# Patient Record
Sex: MALE | Race: WHITE | NOT HISPANIC OR LATINO | URBAN - METROPOLITAN AREA
[De-identification: names, ages, dates, MRNs, and addresses within clinical notes are randomized per-mention and may not be internally consistent; named-entity substitution may affect disease eponyms.]

---

## 2017-05-24 ENCOUNTER — APPOINTMENT (RX ONLY)
Dept: URBAN - METROPOLITAN AREA CLINIC 349 | Facility: CLINIC | Age: 67
Setting detail: DERMATOLOGY
End: 2017-05-24

## 2017-05-24 DIAGNOSIS — D22 MELANOCYTIC NEVI: ICD-10-CM

## 2017-05-24 PROBLEM — D22.0 MELANOCYTIC NEVI OF LIP: Status: ACTIVE | Noted: 2017-05-24

## 2017-05-24 PROBLEM — D22.5 MELANOCYTIC NEVI OF TRUNK: Status: ACTIVE | Noted: 2017-05-24

## 2017-05-24 PROCEDURE — ? SHAVE REMOVAL

## 2017-05-24 PROCEDURE — ? BIOPSY BY SHAVE METHOD

## 2017-05-24 PROCEDURE — ? OTHER

## 2017-05-24 PROCEDURE — 88305 TISSUE EXAM BY PATHOLOGIST: CPT

## 2017-05-24 PROCEDURE — 40490 BIOPSY OF LIP: CPT | Mod: 59

## 2017-05-24 PROCEDURE — 11301 SHAVE SKIN LESION 0.6-1.0 CM: CPT

## 2017-05-24 PROCEDURE — ? COUNSELING

## 2017-05-24 PROCEDURE — A4550 SURGICAL TRAYS: HCPCS

## 2017-05-24 PROCEDURE — ? PATHOLOGY BILLING

## 2017-05-24 PROCEDURE — 11302 SHAVE SKIN LESION 1.1-2.0 CM: CPT

## 2017-05-24 ASSESSMENT — LOCATION DETAILED DESCRIPTION DERM
LOCATION DETAILED: RIGHT INFERIOR MUCOSAL LIP
LOCATION DETAILED: RIGHT INFERIOR MEDIAL MIDBACK
LOCATION DETAILED: LEFT LATERAL ABDOMEN
LOCATION DETAILED: RIGHT INFERIOR MUCOSAL LIP
LOCATION DETAILED: RIGHT INFERIOR MEDIAL MIDBACK
LOCATION DETAILED: LEFT LATERAL ABDOMEN

## 2017-05-24 ASSESSMENT — LOCATION SIMPLE DESCRIPTION DERM
LOCATION SIMPLE: RIGHT INFERIOR MUCOSAL LIP
LOCATION SIMPLE: RIGHT LOWER BACK
LOCATION SIMPLE: ABDOMEN
LOCATION SIMPLE: RIGHT LOWER BACK
LOCATION SIMPLE: RIGHT INFERIOR MUCOSAL LIP
LOCATION SIMPLE: ABDOMEN

## 2017-05-24 ASSESSMENT — LOCATION ZONE DERM
LOCATION ZONE: TRUNK
LOCATION ZONE: LIP
LOCATION ZONE: TRUNK
LOCATION ZONE: LIP

## 2017-05-24 NOTE — PROCEDURE: BIOPSY BY SHAVE METHOD
X Size Of Lesion In Cm: 0
Billing Type: Third-Party Bill
Silver Nitrate Text: The wound bed was treated with silver nitrate after the biopsy was performed.
Hemostasis: Electrodesiccation
Wound Care: Vaseline
Biopsy Method: Dermablade
Dressing: bandage
Bill 25030 For Specimen Handling/Conveyance To Laboratory?: no
Cryotherapy Text: The wound bed was treated with cryotherapy after the biopsy was performed.
Type Of Destruction Used: Curettage
Biopsy Type: H and E
Anesthesia Volume In Cc (Will Not Render If 0): 1
Anesthesia Type: 1% lidocaine with epinephrine
Bill For Surgical Tray: yes
Post-Care Instructions: I reviewed with the patient in detail post-care instructions. Patient is to keep the biopsy site dry overnight, and then apply bacitracin twice daily until healed. Patient may apply hydrogen peroxide soaks to remove any crusting.  After the procedure, the patient was observed for 5-10 minutes and was oriented to person, place and time and demied feeling dizzy, queasy, and stated that they did not feel that they were going to faint.
Detail Level: Detailed
Notification Instructions: Patient will be notified of biopsy results. However, patient instructed to call the office if not contacted within 2 weeks.
Consent: Written consent was obtained and risks were reviewed including but not limited to scarring, infection, bleeding, scabbing, incomplete removal, nerve damage and allergy to anesthesia.
Electrodesiccation And Curettage Text: The wound bed was treated with electrodesiccation and curettage after the biopsy was performed.
Electrodesiccation Text: The wound bed was treated with electrodesiccation after the biopsy was performed.
Accession #: [email or web address]

## 2017-05-24 NOTE — PROCEDURE: SHAVE REMOVAL
Post-Care Instructions: I reviewed with the patient in detail post-care instructions. Patient is to keep the biopsy site dry overnight, and then apply vaseline twice daily until healed. Patient may apply hydrogen peroxide soaks to remove any crusting. After the procedure, the patient was observed for 5-10 minutes and was oriented to person, place and time and demied feeling dizzy, queasy, and stated that they did not feel that they were going to faint.
Accession #: [email or web address]
Anesthesia Volume In Cc: 0.5
Render Post-Care Instructions In Note?: yes
Size Of Lesion In Cm (Required): 0.6
Size Of Margin In Cm (Margins Are Not Added To Billing Dimensions): -
Billing Type: Third-Party Bill
Path Notes (To The Dermatopathologist): Please check margins.
Detail Level: Detailed
Medical Necessity Clause: This procedure was medically necessary because the lesion that was treated was: growing
Anesthesia Type: 1% lidocaine with epinephrine
Medical Necessity Information: It is in your best interest to select a reason for this procedure from the list below. All of these items fulfill various CMS LCD requirements except the new and changing color options.
Bill 13273 For Specimen Handling/Conveyance To Laboratory?: no
X Size Of Lesion In Cm (Optional): 0
Notification Instructions: Patient will be notified of biopsy results. However, patient instructed to call the office if not contacted within 2 weeks.
Hemostasis: Drysol
Biopsy Method: Dermablade
Consent was obtained from the patient. The risks and benefits to therapy were discussed in detail. Specifically, the risks of infection, scarring, bleeding, prolonged wound healing, incomplete removal, allergy to anesthesia, nerve injury and recurrence were addressed. Prior to the procedure, the treatment site was clearly identified and confirmed by the patient. All components of Universal Protocol/PAUSE Rule completed.
Size Of Lesion In Cm (Required): 1.1
Wound Care: Vaseline

## 2018-11-09 ENCOUNTER — HOSPITAL ENCOUNTER (OUTPATIENT)
Dept: PHYSICAL THERAPY | Age: 68
Discharge: HOME | End: 2018-11-09
Attending: FAMILY MEDICINE
Payer: MEDICARE

## 2018-11-09 DIAGNOSIS — M54.2 NECK PAIN: ICD-10-CM

## 2018-11-09 PROCEDURE — 97140 MANUAL THERAPY 1/> REGIONS: CPT

## 2018-11-09 PROCEDURE — G8982 BODY POS GOAL STATUS: HCPCS

## 2018-11-09 PROCEDURE — G8981 BODY POS CURRENT STATUS: HCPCS

## 2018-11-09 PROCEDURE — 97161 PT EVAL LOW COMPLEX 20 MIN: CPT

## 2018-11-09 NOTE — THERAPY EVALUATION
Michaela Rice  : 1950  [place]  [place] [address] [place] [address]  [phone]   [phone]        OUTPATIENT PHYSICAL THERAPY:Initial Assessment and Daily Note 2018   ICD-10: Treatment Diagnosis: Cervicalgia [M54.2]; Headache [R51]  Precautions/Allergies:   Patient has no known allergies. MD Orders: Evaluate and Treat MEDICAL/REFERRING DIAGNOSIS:  Neck pain [M54.2]   DATE OF ONSET: 2018  REFERRING PHYSICIAN: Charles Merritt MD  RETURN PHYSICIAN APPOINTMENT: TBD     INITIAL ASSESSMENT:  Mr. Riya Art presents with signs of R sided neck pain. Mobility restrictions are present in the upper cervical spine bilaterally with pain present during right sided cervical rotation. Pt. Demonstrates moderate myofascial pain additionally in the R splenius capitis, R sternocleidomastoid, and R scalene musculature. Pt. Will benefit from skilled PT including manual therapy, range of motion, and mobility exercises to address impairments identified. PROBLEM LIST (Impacting functional limitations):  1. Decreased Strength  2. Increased Pain  3. Decreased Activity Tolerance  4. Decreased Flexibility/Joint Mobility  5. Decreased [place] with Home Exercise Program INTERVENTIONS PLANNED:  1. Home Exercise Program (HEP)  2. Manual Therapy  3. Range of Motion (ROM)  4. Therapeutic Exercise/Strengthening   TREATMENT PLAN:  Effective Dates: 2018 TO 2019 (90 days). Frequency/Duration: 2 times a week for 90 Day(s)  GOALS: (Goals have been discussed and agreed upon with patient.)  Discharge Goals: Time Frame: 8 weeks  1. Pt. Will demonstrate 60 degrees of cervical rotation bilaterally to improve driving safety. 2. Pt. Will demonstrate <3/10 R sided neck pain with 8 hours of sleep to improve pain. 3. Pt. Will score < 12% on the NDI to demonstrate improved pain and function. Rehabilitation Potential For Stated Goals: Good              The information in this section was collected on 11/9/18 (except where otherwise noted). HISTORY:   History of Present Injury/Illness (Reason for Referral):  Pt. Attends PT c/o R sided neck pain with gradual insidious onset about a year ago. The pain is located along the R side of the neck and aggravated with turning the head, sleeping at night, and performing daily activities. Pt. Denies injury or numbness tingling, difficulty speaking, tinnitus, or difficulty swallowing. Pt. Notes only treatment so far was massage that helped for a few hours. Pt. Would like to address pain and return to previous level of function. Past Medical History/Comorbidities:   Mr. Gisell Warren  has a past medical history of Bradycardia, HLD (hyperlipidemia), Osteoarthritis, and Personal history of colonic polyps. Mr. Gisell Warren  has a past surgical history that includes hx gi (2007); hx colonoscopy (9/10/14); pr abdomen surgery proc unlisted; FISTULOTOMY/MAF  (N/A, 9/10/2014); and COLONOSCOPY WITH POLYPECTOMY (N/A, 9/10/2014). Social History/Living Environment:     Lives with family in 2 story home  Prior Level of Function/Work/Activity:  Functioned independently without limitations. Ambulatory/Rehab Services H2 Model Falls Risk Assessment    Risk Factors:       (1)  Gender [Male] Ability to Rise from Chair:       (0)  Ability to rise in a single movement    Falls Prevention Plan:       No modifications necessary   Total: (5 or greater = High Risk): 1    ©2010 [facility] of [employer]. All Rights Reserved. [facility] States Patent #[id number]. Federal Law prohibits the replication, distribution or use without written permission from [facility] [employer]     Current Medications:       Current Outpatient Medications:     rosuvastatin (CRESTOR) 10 mg tablet, TAKE 1 TABLET BY MOUTH DAILY, Disp: 90 Tab, Rfl: 1    multivitamin (ONE A DAY) tablet, Take 1 tablet by mouth daily. , Disp: , Rfl:     ibuprofen (MOTRIN) 200 mg tablet, Take 200 mg by mouth every six (6) hours as needed for Pain. Hold till after surgery   Indications: PAIN, Disp: , Rfl:     cyanocobalamin (VITAMIN B12) 500 mcg tablet, Take 500 mcg by mouth daily. , Disp: , Rfl:    Date Last Reviewed:  11/9/2018   Number of Personal Factors/Comorbidities that affect the Plan of Care: 0: LOW COMPLEXITY   EXAMINATION:   Observation:       Palpation:  Upper Trapezius:-- Levator Scapulae:-- Posterior Cervical: tender on R side   Sternocleidomastoid: Tender on R side Scalenes: Tender on R side Other:       Flexibility: Limited suboccipitals, SCM, scalenes bilaterally      Range of Motion: Cervical in degrees  Flexion: 50                                        Extesnion: 30                       Right                     Left   Rotation 55 pain 50 pain   Side Bend -- --       Strength: Manual Muscle Test: upper quarter screen 5/5 MMT bilaterally. Cervical Flexion Endurance Test: --    Special Testing:  Spurlings Right: Left:   Upper limb Tension Test (ULTT1) -- --   Cervical Distraction -- --   Cervical flexion rotation test (+) (+)   Other:       Neuro: reflexes 2+ bilateral UE   Body Structures Involved:  1. Joints  2. Muscles Body Functions Affected:  1. Neuromusculoskeletal  2. Movement Related Activities and Participation Affected:  1. Mobility  2. Self Care   Number of elements (examined above) that affect the Plan of Care: 1-2: LOW COMPLEXITY   CLINICAL PRESENTATION:   Presentation: Stable and uncomplicated: LOW COMPLEXITY   CLINICAL DECISION MAKING:   Outcome Measure: Tool Used: Neck Disability Index (NDI)  Score:  Initial: 11/50  Most Recent: X/50 (Date: -- )   Interpretation of Score: The Neck Disability Index is a revised form of the Oswestry Low Back Pain Index and is designed to measure the activities of daily living in person's with neck pain.   Each section is scored on a 0-5 scale, 5 representing the greatest disability. The scores of each section are added together for a total score of 50. Score 0 1-10 11-20 21-30 31-40 41-49 50   Modifier CH CI CJ CK CL CM CN     ? Changing and Maintaining Body Position:     - CURRENT STATUS: CJ - 20%-39% impaired, limited or restricted    - GOAL STATUS: CI - 1%-19% impaired, limited or restricted    - D/C STATUS:  ---------------To be determined---------------    Medical Necessity:   · Patient is expected to demonstrate progress in strength and range of motion to increase independence with sleeping, driving, and ADL's. Reason for Services/Other Comments:  · Patient will benefit from skilled PT to address impairments identified through evaluation and return to previous ADL and recreational capacity. Use of outcome tool(s) and clinical judgement create a POC that gives a: Clear prediction of patient's progress: LOW COMPLEXITY            TREATMENT:   (In addition to Assessment/Re-Assessment sessions the following treatments were rendered)  Pre-treatment Symptoms/Complaints:  R sided neck pain   Pain: Initial:   Pain Intensity 1: 8 /10 Post Session:  6/10     Therapeutic Exercise: (5 Minutes):  Exercises per grid below to improve mobility and strength. Required moderate verbal and manual cues to promote proper body alignment and promote proper body posture. Progressed range as indicated. Date:  11/9/2018   Activity/Exercise Parameters   Cervical rotation 5 minutes                           Manual Therapy (    Soft Tissue Mobilization Duration  Duration: 10 Minutes): Manual techniques to facilitate improved motion and decreased pain.  (Used abbreviations: MET - muscle energy technique; PNF - proprioceptive neuromuscular facilitation; NMR - neuromuscular re-education; a/p - anterior to posterior; p/a - posterior to anterior)   · Soft tissue mobilization: R splenius capitis, sternocleidomastoid, scalenes  · Joint mobilization: C1/2 in prone unilateral p/a on the R side. Grade III-IV    MedBridge Portal  Treatment/Session Assessment:    · Response to Treatment:  Pt. Tolerates ROM exercises without complaints and improved cervical rotation to the Right within session. .  · Compliance with Program/Exercises: Will assess as treatment progresses. · Recommendations/Intent for next treatment session: \"Next visit will focus on manual therapy and ROM exercises. \".   Total Treatment Duration:  PT Patient Time In/Time Out  Time In: 1030  Time Out: [address] [facility] AYLIN Ferris

## 2018-11-20 ENCOUNTER — HOSPITAL ENCOUNTER (OUTPATIENT)
Dept: PHYSICAL THERAPY | Age: 68
Discharge: HOME | End: 2018-11-20
Payer: MEDICARE

## 2018-11-20 PROCEDURE — 97140 MANUAL THERAPY 1/> REGIONS: CPT

## 2018-11-20 PROCEDURE — 97110 THERAPEUTIC EXERCISES: CPT

## 2018-11-20 NOTE — PROGRESS NOTES
Anahi Marsh  : 1950  [place]  [place]: [place] [address] at [address]  [address] [place] [address]  [phone]   [phone]        OUTPATIENT PHYSICAL THERAPY:Daily Note 2018   ICD-10: Treatment Diagnosis: Cervicalgia [M54.2]; Headache [R51]  Precautions/Allergies:   Patient has no known allergies. MD Orders: Evaluate and Treat MEDICAL/REFERRING DIAGNOSIS:  Neck pain [M54.2]   DATE OF ONSET: 2018  REFERRING PHYSICIAN: Kyle Black MD  RETURN PHYSICIAN APPOINTMENT: TBD     INITIAL ASSESSMENT:  Mr. Dandre Burciaga presents with signs of R sided neck pain. Mobility restrictions are present in the upper cervical spine bilaterally with pain present during right sided cervical rotation. Pt. Demonstrates moderate myofascial pain additionally in the R splenius capitis, R sternocleidomastoid, and R scalene musculature. Pt. Will benefit from skilled PT including manual therapy, range of motion, and mobility exercises to address impairments identified. PROBLEM LIST (Impacting functional limitations):  1. Decreased Strength  2. Increased Pain  3. Decreased Activity Tolerance  4. Decreased Flexibility/Joint Mobility  5. Decreased [place] with Home Exercise Program INTERVENTIONS PLANNED:  1. Home Exercise Program (HEP)  2. Manual Therapy  3. Range of Motion (ROM)  4. Therapeutic Exercise/Strengthening   TREATMENT PLAN:  Effective Dates: 2018 TO 2019 (90 days). Frequency/Duration: 2 times a week for 90 Day(s)  GOALS: (Goals have been discussed and agreed upon with patient.)  Discharge Goals: Time Frame: 8 weeks  1. Pt. Will demonstrate 60 degrees of cervical rotation bilaterally to improve driving safety. 2. Pt. Will demonstrate <3/10 R sided neck pain with 8 hours of sleep to improve pain. 3. Pt. Will score < 12% on the NDI to demonstrate improved pain and function.    Rehabilitation Potential For Stated Goals: Good              The information in this section was collected on 11/9/18 (except where otherwise noted). HISTORY:   History of Present Injury/Illness (Reason for Referral):  Pt. Attends PT c/o R sided neck pain with gradual insidious onset about a year ago. The pain is located along the R side of the neck and aggravated with turning the head, sleeping at night, and performing daily activities. Pt. Denies injury or numbness tingling, difficulty speaking, tinnitus, or difficulty swallowing. Pt. Notes only treatment so far was massage that helped for a few hours. Pt. Would like to address pain and return to previous level of function. Past Medical History/Comorbidities:   Mr. Inés De  has a past medical history of Bradycardia, HLD (hyperlipidemia), Osteoarthritis, and Personal history of colonic polyps. Mr. Inés De  has a past surgical history that includes hx gi (2007); hx colonoscopy (9/10/14); pr abdomen surgery proc unlisted; FISTULOTOMY/MAF  (N/A, 9/10/2014); and COLONOSCOPY WITH POLYPECTOMY (N/A, 9/10/2014). Social History/Living Environment:     Lives with family in 2 story home  Prior Level of Function/Work/Activity:  Functioned independently without limitations. Ambulatory/Rehab Services H2 Model Falls Risk Assessment    Risk Factors:       (1)  Gender [Male] Ability to Rise from Chair:       (0)  Ability to rise in a single movement    Falls Prevention Plan:       No modifications necessary   Total: (5 or greater = High Risk): 1    ©2010 [facility] of [employer]. All Rights Reserved. [facility] States Patent #[id number]. Federal Law prohibits the replication, distribution or use without written permission from [facility] [employer]     Current Medications:       Current Outpatient Medications:     rosuvastatin (CRESTOR) 10 mg tablet, TAKE 1 TABLET BY MOUTH DAILY, Disp: 90 Tab, Rfl: 1    multivitamin (ONE A DAY) tablet, Take 1 tablet by mouth daily. , Disp: , Rfl:     ibuprofen (MOTRIN) 200 mg tablet, Take 200 mg by mouth every six (6) hours as needed for Pain. Hold till after surgery   Indications: PAIN, Disp: , Rfl:     cyanocobalamin (VITAMIN B12) 500 mcg tablet, Take 500 mcg by mouth daily. , Disp: , Rfl:    Date Last Reviewed:  11/20/2018   Number of Personal Factors/Comorbidities that affect the Plan of Care: 0: LOW COMPLEXITY   EXAMINATION:   Observation:       Palpation:  Upper Trapezius:-- Levator Scapulae:-- Posterior Cervical: tender on R side   Sternocleidomastoid: Tender on R side Scalenes: Tender on R side Other:       Flexibility: Limited suboccipitals, SCM, scalenes bilaterally      Range of Motion: Cervical in degrees  Flexion: 50                                        Extesnion: 30                       Right                     Left   Rotation 55 pain 50 pain   Side Bend -- --       Strength: Manual Muscle Test: upper quarter screen 5/5 MMT bilaterally. Cervical Flexion Endurance Test: --    Special Testing:  Spurlings Right: Left:   Upper limb Tension Test (ULTT1) -- --   Cervical Distraction -- --   Cervical flexion rotation test (+) (+)   Other:       Neuro: reflexes 2+ bilateral UE   Body Structures Involved:  1. Joints  2. Muscles Body Functions Affected:  1. Neuromusculoskeletal  2. Movement Related Activities and Participation Affected:  1. Mobility  2. Self Care   Number of elements (examined above) that affect the Plan of Care: 1-2: LOW COMPLEXITY   CLINICAL PRESENTATION:   Presentation: Stable and uncomplicated: LOW COMPLEXITY   CLINICAL DECISION MAKING:   Outcome Measure: Tool Used: Neck Disability Index (NDI)  Score:  Initial: 11/50  Most Recent: X/50 (Date: -- )   Interpretation of Score: The Neck Disability Index is a revised form of the Oswestry Low Back Pain Index and is designed to measure the activities of daily living in person's with neck pain. Each section is scored on a 0-5 scale, 5 representing the greatest disability. The scores of each section are added together for a total score of 50. Score 0 1-10 11-20 21-30 31-40 41-49 50   Modifier CH CI CJ CK CL CM CN     ? Changing and Maintaining Body Position:     - CURRENT STATUS: CJ - 20%-39% impaired, limited or restricted    - GOAL STATUS: CI - 1%-19% impaired, limited or restricted    - D/C STATUS:  ---------------To be determined---------------    Medical Necessity:   · Patient is expected to demonstrate progress in strength and range of motion to increase independence with sleeping, driving, and ADL's. Reason for Services/Other Comments:  · Patient will benefit from skilled PT to address impairments identified through evaluation and return to previous ADL and recreational capacity. Use of outcome tool(s) and clinical judgement create a POC that gives a: Clear prediction of patient's progress: LOW COMPLEXITY            TREATMENT:   (In addition to Assessment/Re-Assessment sessions the following treatments were rendered)  Pre-treatment Symptoms/Complaints:  Pt. Notes good compliance with HEP and some improvement in R sided rotation. Pain: Initial:   Pain Intensity 1: 8 /10 Post Session:  6/10     Therapeutic Exercise: (25 Minutes):  Exercises per grid below to improve mobility and strength. Required moderate verbal and manual cues to promote proper body alignment and promote proper body posture. Progressed range as indicated. Date:  11/20/2018   Activity/Exercise Parameters   Cervical rotation 8 minutes   Chin tucks 5 minutes   Cervical Extension SNAGS 3 x 15   Rows (green band) 3 x 20   Foam roll (thoracic extension) 3 minutes           Manual Therapy (    Soft Tissue Mobilization Duration  Duration: 30 Minutes): Manual techniques to facilitate improved motion and decreased pain.  (Used abbreviations: MET - muscle energy technique; PNF - proprioceptive neuromuscular facilitation; NMR - neuromuscular re-education; a/p - anterior to posterior; p/a - posterior to anterior)   · Soft tissue mobilization: R splenius capitis, sternocleidomastoid, scalenes  · Joint mobilization: C1/2 in prone unilateral p/a on the R side. Grade III-IV; C0/C1 flexion grade III  · Joint mobilization: Grade V C1/2 rotation in supine to the L side. · Manual cervical traction    MedBridge Portal  Treatment/Session Assessment:    · Response to Treatment:  Pt. Continues to demonstrate significant R sided upper cervical joint restriction. SNAGS for cervical extension improve pain within session. · Compliance with Program/Exercises: Compliant most of the time. · Recommendations/Intent for next treatment session: \"Next visit will focus on manual therapy and ROM exercises. \".   Total Treatment Duration: 55 minutes total time  PT Patient Time In/Time Out  Time In: 1500  Time Out: 90 [facility] AYLIN Ferris

## 2018-11-21 ENCOUNTER — HOSPITAL ENCOUNTER (OUTPATIENT)
Dept: PHYSICAL THERAPY | Age: 68
Discharge: HOME | End: 2018-11-21
Payer: MEDICARE

## 2018-11-21 PROCEDURE — 97140 MANUAL THERAPY 1/> REGIONS: CPT

## 2018-11-21 PROCEDURE — 97110 THERAPEUTIC EXERCISES: CPT

## 2018-11-21 NOTE — PROGRESS NOTES
Daiana Paulino  : 1950  [place]  Secondary: [address]  [phone]   [phone]        OUTPATIENT PHYSICAL THERAPY:Daily Note 2018   ICD-10: Treatment Diagnosis: Cervicalgia [M54.2]; Headache [R51]  Precautions/Allergies:   Patient has no known allergies. MD Orders: Evaluate and Treat MEDICAL/REFERRING DIAGNOSIS:  Neck pain [M54.2]   DATE OF ONSET: 2018  REFERRING PHYSICIAN: Swati Wilkins MD  RETURN PHYSICIAN APPOINTMENT: TBD     INITIAL ASSESSMENT:  Mr. Ellie Cosme presents with signs of R sided neck pain. Mobility restrictions are present in the upper cervical spine bilaterally with pain present during right sided cervical rotation. Pt. Demonstrates moderate myofascial pain additionally in the R splenius capitis, R sternocleidomastoid, and R scalene musculature. Pt. Will benefit from skilled PT including manual therapy, range of motion, and mobility exercises to address impairments identified. PROBLEM LIST (Impacting functional limitations):  1. Decreased Strength  2. Increased Pain  3. Decreased Activity Tolerance  4. Decreased Flexibility/Joint Mobility  5. Decreased Bee with Home Exercise Program INTERVENTIONS PLANNED:  1. Home Exercise Program (HEP)  2. Manual Therapy  3. Range of Motion (ROM)  4. Therapeutic Exercise/Strengthening   TREATMENT PLAN:  Effective Dates: 2018 TO 2019 (90 days). Frequency/Duration: 2 times a week for 90 Day(s)  GOALS: (Goals have been discussed and agreed upon with patient.)  Discharge Goals: Time Frame: 8 weeks  1. Pt. Will demonstrate 60 degrees of cervical rotation bilaterally to improve driving safety. 2. Pt. Will demonstrate <3/10 R sided neck pain with 8 hours of sleep to improve pain. 3. Pt. Will score < 12% on the NDI to demonstrate improved pain and function.    Rehabilitation Potential For Stated Goals: Good              The information in this section was collected on 11/9/18 (except where otherwise noted). HISTORY:   History of Present Injury/Illness (Reason for Referral):  Pt. Attends PT c/o R sided neck pain with gradual insidious onset about a year ago. The pain is located along the R side of the neck and aggravated with turning the head, sleeping at night, and performing daily activities. Pt. Denies injury or numbness tingling, difficulty speaking, tinnitus, or difficulty swallowing. Pt. Notes only treatment so far was massage that helped for a few hours. Pt. Would like to address pain and return to previous level of function. Past Medical History/Comorbidities:   Mr. Gisell Warren  has a past medical history of Bradycardia, HLD (hyperlipidemia), Osteoarthritis, and Personal history of colonic polyps. Mr. Gisell Warren  has a past surgical history that includes hx gi (2007); hx colonoscopy (9/10/14); pr abdomen surgery proc unlisted; FISTULOTOMY/MAF  (N/A, 9/10/2014); and COLONOSCOPY WITH POLYPECTOMY (N/A, 9/10/2014). Social History/Living Environment:     Lives with family in 2 story home  Prior Level of Function/Work/Activity:  Functioned independently without limitations. Ambulatory/Rehab Services H2 Model Falls Risk Assessment    Risk Factors:       (1)  Gender [Male] Ability to Rise from Chair:       (0)  Ability to rise in a single movement    Falls Prevention Plan:       No modifications necessary   Total: (5 or greater = High Risk): 1    ©2010 [facility] [employer]. All Rights Reserved. [facility] States Patent #[id number]. Federal Law prohibits the replication, distribution or use without written permission from [facility]     Current Medications:       Current Outpatient Medications:     rosuvastatin (CRESTOR) 10 mg tablet, TAKE 1 TABLET BY MOUTH DAILY, Disp: 90 Tab, Rfl: 1    multivitamin (ONE A DAY) tablet, Take 1 tablet by mouth daily. , Disp: , Rfl:     ibuprofen (MOTRIN) 200 mg tablet, Take 200 mg by mouth every six (6) hours as needed for Pain. Hold till after surgery   Indications: PAIN, Disp: , Rfl:     cyanocobalamin (VITAMIN B12) 500 mcg tablet, Take 500 mcg by mouth daily. , Disp: , Rfl:    Date Last Reviewed:  11/21/2018   Number of Personal Factors/Comorbidities that affect the Plan of Care: 0: LOW COMPLEXITY   EXAMINATION:   Observation:       Palpation:  Upper Trapezius:-- Levator Scapulae:-- Posterior Cervical: tender on R side   Sternocleidomastoid: Tender on R side Scalenes: Tender on R side Other:       Flexibility: Limited suboccipitals, SCM, scalenes bilaterally      Range of Motion: Cervical in degrees  Flexion: 50                                        Extesnion: 30                       Right                     Left   Rotation 55 pain 50 pain   Side Bend -- --       Strength: Manual Muscle Test: upper quarter screen 5/5 MMT bilaterally. Cervical Flexion Endurance Test: --    Special Testing:  Spurlings Right: Left:   Upper limb Tension Test (ULTT1) -- --   Cervical Distraction -- --   Cervical flexion rotation test (+) (+)   Other:       Neuro: reflexes 2+ bilateral UE   Body Structures Involved:  1. Joints  2. Muscles Body Functions Affected:  1. Neuromusculoskeletal  2. Movement Related Activities and Participation Affected:  1. Mobility  2. Self Care   Number of elements (examined above) that affect the Plan of Care: 1-2: LOW COMPLEXITY   CLINICAL PRESENTATION:   Presentation: Stable and uncomplicated: LOW COMPLEXITY   CLINICAL DECISION MAKING:   Outcome Measure: Tool Used: Neck Disability Index (NDI)  Score:  Initial: 11/50  Most Recent: X/50 (Date: -- )   Interpretation of Score: The Neck Disability Index is a revised form of the Oswestry Low Back Pain Index and is designed to measure the activities of daily living in person's with neck pain. Each section is scored on a 0-5 scale, 5 representing the greatest disability. The scores of each section are added together for a total score of 50. Score 0 1-10 11-20 21-30 31-40 41-49 50   Modifier CH CI CJ CK CL CM CN     ? Changing and Maintaining Body Position:     - CURRENT STATUS: CJ - 20%-39% impaired, limited or restricted    - GOAL STATUS: CI - 1%-19% impaired, limited or restricted    - D/C STATUS:  ---------------To be determined---------------    Medical Necessity:   · Patient is expected to demonstrate progress in strength and range of motion to increase independence with sleeping, driving, and ADL's. Reason for Services/Other Comments:  · Patient will benefit from skilled PT to address impairments identified through evaluation and return to previous ADL and recreational capacity. Use of outcome tool(s) and clinical judgement create a POC that gives a: Clear prediction of patient's progress: LOW COMPLEXITY            TREATMENT:   (In addition to Assessment/Re-Assessment sessions the following treatments were rendered)  Pre-treatment Symptoms/Complaints:  Pt. Notes moderate soreness this AM.  Pt. Reports taking alleve that helped with the pain. Pain: Initial:   Pain Intensity 1: 8 /10 Post Session:  7/10     Therapeutic Exercise: (20 Minutes):  Exercises per grid below to improve mobility and strength. Required moderate verbal and manual cues to promote proper body alignment and promote proper body posture. Progressed range as indicated. Date:  11/21/2018   Activity/Exercise Parameters   Cervical rotation 8 minutes   Chin tucks 3 minutes   Cervical Extension SNAGS --   Rows (green band) --   Foam roll (thoracic extension, chest stretch) 3 minutes   Isometric cervical extensions (green band) 3 x 10   Bird dog 3 x 10   Manual Therapy (    Soft Tissue Mobilization Duration  Duration: 30 Minutes): Manual techniques to facilitate improved motion and decreased pain.  (Used abbreviations: MET - muscle energy technique; PNF - proprioceptive neuromuscular facilitation; NMR - neuromuscular re-education; a/p - anterior to posterior; p/a - posterior to anterior)   · Soft tissue mobilization: R splenius capitis, sternocleidomastoid, scalenes  · Joint mobilization: C1/2 in prone unilateral p/a on the R side. Grade III-IV; C0/C1 flexion grade III  · Joint mobilization: Grade V C1/2 rotation in supine to the L side. · Manual cervical traction    [facility] Portal  Treatment/Session Assessment:    · Response to Treatment:  Pt. Remains tender to palpation of the R SCM, splenius capitis, and scalene musculature. Cervical extension is improved today, but R sided rotation remains limited with pain. · Compliance with Program/Exercises: Compliant most of the time. · Recommendations/Intent for next treatment session: \"Next visit will focus on manual therapy and ROM exercises. \".   Total Treatment Duration: 50 minutes total time  PT Patient Time In/Time Out  Time In: 3850  Time Out: Silvano Lima [address], PT

## 2018-11-26 ENCOUNTER — HOSPITAL ENCOUNTER (OUTPATIENT)
Dept: PHYSICAL THERAPY | Age: 68
Discharge: HOME | End: 2018-11-26
Payer: MEDICARE

## 2018-11-26 PROCEDURE — 97140 MANUAL THERAPY 1/> REGIONS: CPT

## 2018-11-26 PROCEDURE — 97110 THERAPEUTIC EXERCISES: CPT

## 2018-11-26 NOTE — PROGRESS NOTES
Michaela Rice  : 1950  [place]  [place] [address] [facility]  [address], [address]  [phone]   [phone]        OUTPATIENT PHYSICAL THERAPY:Daily Note 2018   ICD-10: Treatment Diagnosis: Cervicalgia [M54.2]; Headache [R51]  Precautions/Allergies:   Patient has no known allergies. MD Orders: Evaluate and Treat MEDICAL/REFERRING DIAGNOSIS:  Neck pain [M54.2]   DATE OF ONSET: 2018  REFERRING PHYSICIAN: Charles Merritt MD  RETURN PHYSICIAN APPOINTMENT: TBD     INITIAL ASSESSMENT:  Mr. Riya Art presents with signs of R sided neck pain. Mobility restrictions are present in the upper cervical spine bilaterally with pain present during right sided cervical rotation. Pt. Demonstrates moderate myofascial pain additionally in the R splenius capitis, R sternocleidomastoid, and R scalene musculature. Pt. Will benefit from skilled PT including manual therapy, range of motion, and mobility exercises to address impairments identified. PROBLEM LIST (Impacting functional limitations):  1. Decreased Strength  2. Increased Pain  3. Decreased Activity Tolerance  4. Decreased Flexibility/Joint Mobility  5. Decreased Christian with Home Exercise Program INTERVENTIONS PLANNED:  1. Home Exercise Program (HEP)  2. Manual Therapy  3. Range of Motion (ROM)  4. Therapeutic Exercise/Strengthening   TREATMENT PLAN:  Effective Dates: 2018 TO 2019 (90 days). Frequency/Duration: 2 times a week for 90 Day(s)  GOALS: (Goals have been discussed and agreed upon with patient.)  Discharge Goals: Time Frame: 8 weeks  1. Pt. Will demonstrate 60 degrees of cervical rotation bilaterally to improve driving safety. 2. Pt. Will demonstrate <3/10 R sided neck pain with 8 hours of sleep to improve pain. 3. Pt. Will score < 12% on the NDI to demonstrate improved pain and function.    Rehabilitation Potential For Stated Goals: Good              The information in this section was collected on 11/9/18 (except where otherwise noted). HISTORY:   History of Present Injury/Illness (Reason for Referral):  Pt. Attends PT c/o R sided neck pain with gradual insidious onset about a year ago. The pain is located along the R side of the neck and aggravated with turning the head, sleeping at night, and performing daily activities. Pt. Denies injury or numbness tingling, difficulty speaking, tinnitus, or difficulty swallowing. Pt. Notes only treatment so far was massage that helped for a few hours. Pt. Would like to address pain and return to previous level of function. Past Medical History/Comorbidities:   Mr. Phillip Bahena  has a past medical history of Bradycardia, HLD (hyperlipidemia), Osteoarthritis, and Personal history of colonic polyps. Mr. Phillip Bahena  has a past surgical history that includes hx gi (2007); hx colonoscopy (9/10/14); pr abdomen surgery proc unlisted; FISTULOTOMY/MAF  (N/A, 9/10/2014); and COLONOSCOPY WITH POLYPECTOMY (N/A, 9/10/2014). Social History/Living Environment:     Lives with family in 2 story home  Prior Level of Function/Work/Activity:  Functioned independently without limitations. Ambulatory/Rehab Services H2 Model Falls Risk Assessment    Risk Factors:       (1)  Gender [Male] Ability to Rise from Chair:       (0)  Ability to rise in a single movement    Falls Prevention Plan:       No modifications necessary   Total: (5 or greater = High Risk): 1    ©2010 [facility] [place]. All Rights Reserved. [facility] States Patent #[id number]. Federal Law prohibits the replication, distribution or use without written permission from [facility] [employer]     Current Medications:       Current Outpatient Medications:     rosuvastatin (CRESTOR) 10 mg tablet, TAKE 1 TABLET BY MOUTH DAILY, Disp: 90 Tab, Rfl: 1    multivitamin (ONE A DAY) tablet, Take 1 tablet by mouth daily. , Disp: , Rfl:     ibuprofen (MOTRIN) 200 mg tablet, Take 200 mg by mouth every six (6) hours as needed for Pain. Hold till after surgery   Indications: PAIN, Disp: , Rfl:     cyanocobalamin (VITAMIN B12) 500 mcg tablet, Take 500 mcg by mouth daily. , Disp: , Rfl:    Date Last Reviewed:  11/26/2018   Number of Personal Factors/Comorbidities that affect the Plan of Care: 0: LOW COMPLEXITY   EXAMINATION:   Observation:       Palpation:  Upper Trapezius:-- Levator Scapulae:-- Posterior Cervical: tender on R side   Sternocleidomastoid: Tender on R side Scalenes: Tender on R side Other:       Flexibility: Limited suboccipitals, SCM, scalenes bilaterally      Range of Motion: Cervical in degrees  Flexion: 50                                        Extesnion: 30                       Right                     Left   Rotation 55 pain 50 pain   Side Bend -- --       Strength: Manual Muscle Test: upper quarter screen 5/5 MMT bilaterally. Cervical Flexion Endurance Test: --    Special Testing:  Spurlings Right: Left:   Upper limb Tension Test (ULTT1) -- --   Cervical Distraction -- --   Cervical flexion rotation test (+) (+)   Other:       Neuro: reflexes 2+ bilateral UE   Body Structures Involved:  1. Joints  2. Muscles Body Functions Affected:  1. Neuromusculoskeletal  2. Movement Related Activities and Participation Affected:  1. Mobility  2. Self Care   Number of elements (examined above) that affect the Plan of Care: 1-2: LOW COMPLEXITY   CLINICAL PRESENTATION:   Presentation: Stable and uncomplicated: LOW COMPLEXITY   CLINICAL DECISION MAKING:   Outcome Measure: Tool Used: Neck Disability Index (NDI)  Score:  Initial: 11/50  Most Recent: X/50 (Date: -- )   Interpretation of Score: The Neck Disability Index is a revised form of the Oswestry Low Back Pain Index and is designed to measure the activities of daily living in person's with neck pain. Each section is scored on a 0-5 scale, 5 representing the greatest disability. The scores of each section are added together for a total score of 50. Score 0 1-10 11-20 21-30 31-40 41-49 50   Modifier CH CI CJ CK CL CM CN     ? Changing and Maintaining Body Position:     - CURRENT STATUS: CJ - 20%-39% impaired, limited or restricted    - GOAL STATUS: CI - 1%-19% impaired, limited or restricted    - D/C STATUS:  ---------------To be determined---------------    Medical Necessity:   · Patient is expected to demonstrate progress in strength and range of motion to increase independence with sleeping, driving, and ADL's. Reason for Services/Other Comments:  · Patient will benefit from skilled PT to address impairments identified through evaluation and return to previous ADL and recreational capacity. Use of outcome tool(s) and clinical judgement create a POC that gives a: Clear prediction of patient's progress: LOW COMPLEXITY            TREATMENT:   (In addition to Assessment/Re-Assessment sessions the following treatments were rendered)  Pre-treatment Symptoms/Complaints:  Pt. Reports minimal change in R sided neck pain thus far. Pt. Does note some improvement in cervical ROM. Pain: Initial:   Pain Intensity 1: 8 /10 Post Session:  7/10     Therapeutic Exercise: (25 Minutes):  Exercises per grid below to improve mobility and strength. Required moderate verbal and manual cues to promote proper body alignment and promote proper body posture. Progressed range as indicated.      Date:  11/26/2018   Activity/Exercise Parameters   Cervical rotation 6 minutes   Chin tucks (central and in slight rotation bilaterally) 5 minutes   Cervical Extension SNAGS 3 x 10   Rows (green band) 3 x 10   Foam roll (thoracic extension, chest stretch) 3 minutes   Isometric cervical extensions (green band) 3 x 10   Bird dog --   Cervical rotation SNAGS 3 x 10   Manual Therapy (    Soft Tissue Mobilization Duration  Duration: 30 Minutes): Manual techniques to facilitate improved motion and decreased pain. (Used abbreviations: MET - muscle energy technique; PNF - proprioceptive neuromuscular facilitation; NMR - neuromuscular re-education; a/p - anterior to posterior; p/a - posterior to anterior)   · Soft tissue mobilization: R splenius capitis, sternocleidomastoid, scalenes  · Joint mobilization: C1/2 in prone unilateral p/a on the R side. Grade III-IV; C0/C1 flexion grade III  · Joint mobilization: Grade V C1/2 rotation in supine to the R side. · Manual cervical traction    [facility]  Treatment/Session Assessment:    · Response to Treatment:  Pt. Achieves 60 degrees of cervical rotation bilaterally today. Pt. Requires cueing to avoid compensation of excessive side bend when rotating to the R.       · Compliance with Program/Exercises: Compliant most of the time. · Recommendations/Intent for next treatment session: \"Next visit will focus on manual therapy and ROM exercises. \".   Total Treatment Duration: 55 minutes total time  PT Patient Time In/Time Out  Time In: 1510  Time Out: 401 Kristian Ceja, PT

## 2018-11-29 ENCOUNTER — HOSPITAL ENCOUNTER (OUTPATIENT)
Dept: PHYSICAL THERAPY | Age: 68
Discharge: HOME | End: 2018-11-29
Payer: MEDICARE

## 2018-11-29 PROCEDURE — 97140 MANUAL THERAPY 1/> REGIONS: CPT

## 2018-11-29 PROCEDURE — 97110 THERAPEUTIC EXERCISES: CPT

## 2018-11-29 NOTE — PROGRESS NOTES
Marbin Welsh  : 1950  [place]  [place] [address] at [address]  [phone]   [phone]        OUTPATIENT PHYSICAL THERAPY:Daily Note 2018   ICD-10: Treatment Diagnosis: Cervicalgia [M54.2]; Headache [R51]  Precautions/Allergies:   Patient has no known allergies. MD Orders: Evaluate and Treat MEDICAL/REFERRING DIAGNOSIS:  Neck pain [M54.2]   DATE OF ONSET: 2018  REFERRING PHYSICIAN: Niels Castro MD  RETURN PHYSICIAN APPOINTMENT: TBD     INITIAL ASSESSMENT:  Mr. Lorrie Butler presents with signs of R sided neck pain. Mobility restrictions are present in the upper cervical spine bilaterally with pain present during right sided cervical rotation. Pt. Demonstrates moderate myofascial pain additionally in the R splenius capitis, R sternocleidomastoid, and R scalene musculature. Pt. Will benefit from skilled PT including manual therapy, range of motion, and mobility exercises to address impairments identified. PROBLEM LIST (Impacting functional limitations):  1. Decreased Strength  2. Increased Pain  3. Decreased Activity Tolerance  4. Decreased Flexibility/Joint Mobility  5. Decreased [place] with Home Exercise Program INTERVENTIONS PLANNED:  1. Home Exercise Program (HEP)  2. Manual Therapy  3. Range of Motion (ROM)  4. Therapeutic Exercise/Strengthening   TREATMENT PLAN:  Effective Dates: 2018 TO 2019 (90 days). Frequency/Duration: 2 times a week for 90 Day(s)  GOALS: (Goals have been discussed and agreed upon with patient.)  Discharge Goals: Time Frame: 8 weeks  1. Pt. Will demonstrate 60 degrees of cervical rotation bilaterally to improve driving safety. 2. Pt. Will demonstrate <3/10 R sided neck pain with 8 hours of sleep to improve pain. 3. Pt. Will score < 12% on the NDI to demonstrate improved pain and function.    Rehabilitation Potential For Stated Goals: Good              The information in this section was collected on 11/9/18 (except where otherwise noted). HISTORY:   History of Present Injury/Illness (Reason for Referral):  Pt. Attends PT c/o R sided neck pain with gradual insidious onset about a year ago. The pain is located along the R side of the neck and aggravated with turning the head, sleeping at night, and performing daily activities. Pt. Denies injury or numbness tingling, difficulty speaking, tinnitus, or difficulty swallowing. Pt. Notes only treatment so far was massage that helped for a few hours. Pt. Would like to address pain and return to previous level of function. Past Medical History/Comorbidities:   Mr. Lynn Palmer  has a past medical history of Bradycardia, HLD (hyperlipidemia), Osteoarthritis, and Personal history of colonic polyps. Mr. Lynn Palmer  has a past surgical history that includes hx gi (2007); hx colonoscopy (9/10/14); pr abdomen surgery proc unlisted; FISTULOTOMY/MAF  (N/A, 9/10/2014); and COLONOSCOPY WITH POLYPECTOMY (N/A, 9/10/2014). Social History/Living Environment:     Lives with family in [address] home  Prior Level of Function/Work/Activity:  Functioned independently without limitations. Ambulatory/Rehab Services H2 Model Falls Risk Assessment    Risk Factors:       (1)  Gender [Male] Ability to Rise from Chair:       (0)  Ability to rise in a single movement    Falls Prevention Plan:       No modifications necessary   Total: (5 or greater = High Risk): 1    ©2010 [facility]. All Rights Reserved. [facility] States Patent #[id number]. Federal Law prohibits the replication, distribution or use without written permission from [facility] of [address] [place] [address]     Current Medications:       Current Outpatient Medications:     rosuvastatin (CRESTOR) 10 mg tablet, TAKE 1 TABLET BY MOUTH DAILY, Disp: 90 Tab, Rfl: 1    multivitamin (ONE A DAY) tablet, Take 1 tablet by mouth daily. , Disp: , Rfl:     ibuprofen (MOTRIN) 200 mg tablet, Take 200 mg by mouth every six (6) hours as needed for Pain. Hold till after surgery   Indications: PAIN, Disp: , Rfl:     cyanocobalamin (VITAMIN B12) 500 mcg tablet, Take 500 mcg by mouth daily. , Disp: , Rfl:    Date Last Reviewed:  11/29/2018   Number of Personal Factors/Comorbidities that affect the Plan of Care: 0: LOW COMPLEXITY   EXAMINATION:   Observation:       Palpation:  Upper Trapezius:-- Levator Scapulae:-- Posterior Cervical: tender on R side   Sternocleidomastoid: Tender on R side Scalenes: Tender on R side Other:       Flexibility: Limited suboccipitals, SCM, scalenes bilaterally      Range of Motion: Cervical in degrees  Flexion: 50                                        Extesnion: 30                       Right                     Left   Rotation 55 pain 50 pain   Side Bend -- --       Strength: Manual Muscle Test: upper quarter screen 5/5 MMT bilaterally. Cervical Flexion Endurance Test: --    Special Testing:  Spurlings Right: Left:   Upper limb Tension Test (ULTT1) -- --   Cervical Distraction -- --   Cervical flexion rotation test (+) (+)   Other:       Neuro: reflexes 2+ bilateral UE   Body Structures Involved:  1. Joints  2. Muscles Body Functions Affected:  1. Neuromusculoskeletal  2. Movement Related Activities and Participation Affected:  1. Mobility  2. Self Care   Number of elements (examined above) that affect the Plan of Care: 1-2: LOW COMPLEXITY   CLINICAL PRESENTATION:   Presentation: Stable and uncomplicated: LOW COMPLEXITY   CLINICAL DECISION MAKING:   Outcome Measure: Tool Used: Neck Disability Index (NDI)  Score:  Initial: 11/50  Most Recent: X/50 (Date: -- )   Interpretation of Score: The Neck Disability Index is a revised form of the Oswestry Low Back Pain Index and is designed to measure the activities of daily living in person's with neck pain. Each section is scored on a 0-5 scale, 5 representing the greatest disability. The scores of each section are added together for a total score of 50. Score 0 1-10 11-20 21-30 31-40 41-49 50   Modifier CH CI CJ CK CL CM CN     ? Changing and Maintaining Body Position:     - CURRENT STATUS: CJ - 20%-39% impaired, limited or restricted    - GOAL STATUS: CI - 1%-19% impaired, limited or restricted    - D/C STATUS:  ---------------To be determined---------------    Medical Necessity:   · Patient is expected to demonstrate progress in strength and range of motion to increase independence with sleeping, driving, and ADL's. Reason for Services/Other Comments:  · Patient will benefit from skilled PT to address impairments identified through evaluation and return to previous ADL and recreational capacity. Use of outcome tool(s) and clinical judgement create a POC that gives a: Clear prediction of patient's progress: LOW COMPLEXITY            TREATMENT:   (In addition to Assessment/Re-Assessment sessions the following treatments were rendered)  Pre-treatment Symptoms/Complaints:  Pt. Notes he did not take an anti inflammatory today. Pain is worse when not taking antiinflammatory. Pain: Initial:   Pain Intensity 1: 8 /10 Post Session:  7/10     Therapeutic Exercise: (25 Minutes):  Exercises per grid below to improve mobility and strength. Required moderate verbal and manual cues to promote proper body alignment and promote proper body posture. Progressed range as indicated. Date:  11/29/2018   Activity/Exercise Parameters   Cervical rotation 6 minutes   Chin tucks (central and in slight rotation bilaterally) 5 minutes   Cervical Extension SNAGS 3 x 10   Rows (green band) 3 x 10   Foam roll (thoracic extension, chest stretch) --   Isometric cervical extensions (green band) 4 minutes   Bird dog --   Cervical rotation SNAGS 3 x 10   Manual Therapy (    Soft Tissue Mobilization Duration  Duration: 30 Minutes): Manual techniques to facilitate improved motion and decreased pain. (Used abbreviations: MET - muscle energy technique; PNF - proprioceptive neuromuscular facilitation; NMR - neuromuscular re-education; a/p - anterior to posterior; p/a - posterior to anterior)   · Soft tissue mobilization: R splenius capitis, sternocleidomastoid, scalenes  · Joint mobilization: C1/2 in prone unilateral p/a on the R side. Grade III-IV; C0/C1 flexion grade III  · Joint mobilization: Grade V C1/2 rotation in supine to the R side. · Manual cervical traction    [facility] Portal  Treatment/Session Assessment:    · Response to Treatment:  Pain and range of motion with R sided cervical rotation improve within session again today. Symptoms return after periods of inactivity. Pt. Continues to demonstrate moderate myofascial pain in the R splenius capitis and SCM. · Compliance with Program/Exercises: Compliant most of the time. · Recommendations/Intent for next treatment session: \"Next visit will focus on manual therapy and ROM exercises. \".   Total Treatment Duration: 55 minutes total time  PT Patient Time In/Time Out  Time In: 1400  Time Out: 994 [facility] A[facility]

## 2018-12-04 ENCOUNTER — HOSPITAL ENCOUNTER (OUTPATIENT)
Dept: PHYSICAL THERAPY | Age: 68
Discharge: HOME | End: 2018-12-04
Payer: MEDICARE

## 2018-12-04 PROCEDURE — 97140 MANUAL THERAPY 1/> REGIONS: CPT

## 2018-12-04 PROCEDURE — 97110 THERAPEUTIC EXERCISES: CPT

## 2018-12-04 NOTE — PROGRESS NOTES
Shania Duque  : 1950  [place]  [place]: [place] [address] at [address]  [phone]   [phone]        OUTPATIENT PHYSICAL THERAPY:Daily Note 2018   ICD-10: Treatment Diagnosis: Cervicalgia [M54.2]; Headache [R51]  Precautions/Allergies:   Patient has no known allergies. MD Orders: Evaluate and Treat MEDICAL/REFERRING DIAGNOSIS:  Neck pain [M54.2]   DATE OF ONSET: 2018  REFERRING PHYSICIAN: Paras Quintero MD  RETURN PHYSICIAN APPOINTMENT: TBD     INITIAL ASSESSMENT:  Mr. Anai Cummings presents with signs of R sided neck pain. Mobility restrictions are present in the upper cervical spine bilaterally with pain present during right sided cervical rotation. Pt. Demonstrates moderate myofascial pain additionally in the R splenius capitis, R sternocleidomastoid, and R scalene musculature. Pt. Will benefit from skilled PT including manual therapy, range of motion, and mobility exercises to address impairments identified. PROBLEM LIST (Impacting functional limitations):  1. Decreased Strength  2. Increased Pain  3. Decreased Activity Tolerance  4. Decreased Flexibility/Joint Mobility  5. Decreased [place] with Home Exercise Program INTERVENTIONS PLANNED:  1. Home Exercise Program (HEP)  2. Manual Therapy  3. Range of Motion (ROM)  4. Therapeutic Exercise/Strengthening   TREATMENT PLAN:  Effective Dates: 2018 TO 2019 (90 days). Frequency/Duration: 2 times a week for 90 Day(s)  GOALS: (Goals have been discussed and agreed upon with patient.)  Discharge Goals: Time Frame: 8 weeks  1. Pt. Will demonstrate 60 degrees of cervical rotation bilaterally to improve driving safety. 2. Pt. Will demonstrate <3/10 R sided neck pain with 8 hours of sleep to improve pain. 3. Pt. Will score < 12% on the NDI to demonstrate improved pain and function.    Rehabilitation Potential For Stated Goals: Good              The information in this section was collected on 11/9/18 (except where otherwise noted). HISTORY:   History of Present Injury/Illness (Reason for Referral):  Pt. Attends PT c/o R sided neck pain with gradual insidious onset about a year ago. The pain is located along the R side of the neck and aggravated with turning the head, sleeping at night, and performing daily activities. Pt. Denies injury or numbness tingling, difficulty speaking, tinnitus, or difficulty swallowing. Pt. Notes only treatment so far was massage that helped for a few hours. Pt. Would like to address pain and return to previous level of function. Past Medical History/Comorbidities:   Mr. Le Watson  has a past medical history of Bradycardia, HLD (hyperlipidemia), Osteoarthritis, and Personal history of colonic polyps. Mr. Le Watson  has a past surgical history that includes hx gi (2007); hx colonoscopy (9/10/14); pr abdomen surgery proc unlisted; FISTULOTOMY/MAF  (N/A, 9/10/2014); and COLONOSCOPY WITH POLYPECTOMY (N/A, 9/10/2014). Social History/Living Environment:     Lives with family in 2 story home  Prior Level of Function/Work/Activity:  Functioned independently without limitations. Ambulatory/Rehab Services H2 Model Falls Risk Assessment    Risk Factors:       (1)  Gender [Male] Ability to Rise from Chair:       (0)  Ability to rise in a single movement    Falls Prevention Plan:       No modifications necessary   Total: (5 or greater = High Risk): 1    ©2010 [facility] of [employer]. All Rights Reserved. [facility] States Patent #[id number]. Federal Law prohibits the replication, distribution or use without written permission from [facility] [employer]     Current Medications:       Current Outpatient Medications:     rosuvastatin (CRESTOR) 10 mg tablet, TAKE 1 TABLET BY MOUTH DAILY, Disp: 90 Tab, Rfl: 1    multivitamin (ONE A DAY) tablet, Take 1 tablet by mouth daily. , Disp: , Rfl:     ibuprofen (MOTRIN) 200 mg tablet, Take 200 mg by mouth every six (6) hours as needed for Pain. Hold till after surgery   Indications: PAIN, Disp: , Rfl:     cyanocobalamin (VITAMIN B12) 500 mcg tablet, Take 500 mcg by mouth daily. , Disp: , Rfl:    Date Last Reviewed:  12/4/2018   Number of Personal Factors/Comorbidities that affect the Plan of Care: 0: LOW COMPLEXITY   EXAMINATION:   Observation:       Palpation:  Upper Trapezius:-- Levator Scapulae:-- Posterior Cervical: tender on R side   Sternocleidomastoid: Tender on R side Scalenes: Tender on R side Other:       Flexibility: Limited suboccipitals, SCM, scalenes bilaterally      Range of Motion: Cervical in degrees  Flexion: 50                                        Extesnion: 30                       Right                     Left   Rotation 55 pain 50 pain   Side Bend -- --       Strength: Manual Muscle Test: upper quarter screen 5/5 MMT bilaterally. Cervical Flexion Endurance Test: --    Special Testing:  Spurlings Right: Left:   Upper limb Tension Test (ULTT1) -- --   Cervical Distraction -- --   Cervical flexion rotation test (+) (+)   Other:       Neuro: reflexes 2+ bilateral UE   Body Structures Involved:  1. Joints  2. Muscles Body Functions Affected:  1. Neuromusculoskeletal  2. Movement Related Activities and Participation Affected:  1. Mobility  2. Self Care   Number of elements (examined above) that affect the Plan of Care: 1-2: LOW COMPLEXITY   CLINICAL PRESENTATION:   Presentation: Stable and uncomplicated: LOW COMPLEXITY   CLINICAL DECISION MAKING:   Outcome Measure: Tool Used: Neck Disability Index (NDI)  Score:  Initial: 11/50  Most Recent: X/50 (Date: -- )   Interpretation of Score: The Neck Disability Index is a revised form of the Oswestry Low Back Pain Index and is designed to measure the activities of daily living in person's with neck pain. Each section is scored on a 0-5 scale, 5 representing the greatest disability.   The scores of each section are added together for a total score of 50. Score 0 1-10 11-20 21-30 31-40 41-49 50   Modifier CH CI CJ CK CL CM CN     ? Changing and Maintaining Body Position:     - CURRENT STATUS: CJ - 20%-39% impaired, limited or restricted    - GOAL STATUS: CI - 1%-19% impaired, limited or restricted    - D/C STATUS:  ---------------To be determined---------------    Medical Necessity:   · Patient is expected to demonstrate progress in strength and range of motion to increase independence with sleeping, driving, and ADL's. Reason for Services/Other Comments:  · Patient will benefit from skilled PT to address impairments identified through evaluation and return to previous ADL and recreational capacity. Use of outcome tool(s) and clinical judgement create a POC that gives a: Clear prediction of patient's progress: LOW COMPLEXITY            TREATMENT:   (In addition to Assessment/Re-Assessment sessions the following treatments were rendered)  Pre-treatment Symptoms/Complaints:  Pt. Reports some improvement in ROM since starting PT. R sided neck pain remains pretty consistent with movement. Pain: Initial:   Pain Intensity 1: 7 /10 Post Session:  7/10     Therapeutic Exercise: (25 Minutes):  Exercises per grid below to improve mobility and strength. Required moderate verbal and manual cues to promote proper body alignment and promote proper body posture. Progressed range as indicated.      Date:  12/4/2018   Activity/Exercise Parameters   Cervical rotation 6 minutes   Chin tucks (central and in slight rotation bilaterally) 5 minutes   Cervical Extension SNAGS 3 x 10   Rows (green band) --   Foam roll (thoracic extension, chest stretch) --   Isometric cervical extensions (green band) 4 minutes   Bird dog --   Cervical rotation SNAGS 3 x 10   Manual Therapy (    Soft Tissue Mobilization Duration  Duration: 30 Minutes): Manual techniques to facilitate improved motion and decreased pain. (Used abbreviations: MET - muscle energy technique; PNF - proprioceptive neuromuscular facilitation; NMR - neuromuscular re-education; a/p - anterior to posterior; p/a - posterior to anterior)   · Soft tissue mobilization: R splenius capitis, sternocleidomastoid, scalenes  · Joint mobilization: C1/2 in prone unilateral p/a on the R side. Grade III-IV; C0/C1 flexion grade III  · Joint mobilization: Grade V C1/2 rotation in supine to the R side. · Manual cervical traction   · Joint mobilization: 1st/2nd rib manipulation in supine, R side grad V a/p    [facility] Portal  Treatment/Session Assessment:    · Response to Treatment:  Pt. Demonstrates decreased pain in the R lateral neck when transitioning from supine to sitting performance chin tuck at the same time to decreased reliance on sternocleidomastoid. Pt. Would benefit from dry needling in the SCM and splenius capitis. · Compliance with Program/Exercises: Compliant most of the time. · Recommendations/Intent for next treatment session: \"Next visit will focus on manual therapy and ROM exercises. \".   Total Treatment Duration: 55 minutes total time  PT Patient Time In/Time Out  Time In: 5390  Time Out: 410 [address]

## 2018-12-06 ENCOUNTER — HOSPITAL ENCOUNTER (OUTPATIENT)
Dept: PHYSICAL THERAPY | Age: 68
Discharge: HOME | End: 2018-12-06
Payer: MEDICARE

## 2018-12-06 PROCEDURE — 97140 MANUAL THERAPY 1/> REGIONS: CPT

## 2018-12-06 PROCEDURE — 97110 THERAPEUTIC EXERCISES: CPT

## 2018-12-06 NOTE — PROGRESS NOTES
Molly Kang  : 1950  [place]  [place]: [place] [address] at [address]  [phone]   [phone]        OUTPATIENT PHYSICAL THERAPY:Daily Note 2018   ICD-10: Treatment Diagnosis: Cervicalgia [M54.2]; Headache [R51]  Precautions/Allergies:   Patient has no known allergies. MD Orders: Evaluate and Treat MEDICAL/REFERRING DIAGNOSIS:  Neck pain [M54.2]   DATE OF ONSET: 2018  REFERRING PHYSICIAN: Gabbie Amador MD  RETURN PHYSICIAN APPOINTMENT: TBD     INITIAL ASSESSMENT:  Mr. Brandyn Cordero presents with signs of R sided neck pain. Mobility restrictions are present in the upper cervical spine bilaterally with pain present during right sided cervical rotation. Pt. Demonstrates moderate myofascial pain additionally in the R splenius capitis, R sternocleidomastoid, and R scalene musculature. Pt. Will benefit from skilled PT including manual therapy, range of motion, and mobility exercises to address impairments identified. PROBLEM LIST (Impacting functional limitations):  1. Decreased Strength  2. Increased Pain  3. Decreased Activity Tolerance  4. Decreased Flexibility/Joint Mobility  5. Decreased [place] with Home Exercise Program INTERVENTIONS PLANNED:  1. Home Exercise Program (HEP)  2. Manual Therapy  3. Range of Motion (ROM)  4. Therapeutic Exercise/Strengthening   TREATMENT PLAN:  Effective Dates: 2018 TO 2019 (90 days). Frequency/Duration: 2 times a week for 90 Day(s)  GOALS: (Goals have been discussed and agreed upon with patient.)  Discharge Goals: Time Frame: 8 weeks  1. Pt. Will demonstrate 60 degrees of cervical rotation bilaterally to improve driving safety. 2. Pt. Will demonstrate <3/10 R sided neck pain with 8 hours of sleep to improve pain. 3. Pt. Will score < 12% on the NDI to demonstrate improved pain and function.    Rehabilitation Potential For Stated Goals: Good              The information in this section was collected on 11/9/18 (except where otherwise noted). HISTORY:   History of Present Injury/Illness (Reason for Referral):  Pt. Attends PT c/o R sided neck pain with gradual insidious onset about a year ago. The pain is located along the R side of the neck and aggravated with turning the head, sleeping at night, and performing daily activities. Pt. Denies injury or numbness tingling, difficulty speaking, tinnitus, or difficulty swallowing. Pt. Notes only treatment so far was massage that helped for a few hours. Pt. Would like to address pain and return to previous level of function. Past Medical History/Comorbidities:   Mr. Mo Avalos  has a past medical history of Bradycardia, HLD (hyperlipidemia), Osteoarthritis, and Personal history of colonic polyps. Mr. Mo Avalos  has a past surgical history that includes hx gi (2007); hx colonoscopy (9/10/14); pr abdomen surgery proc unlisted; FISTULOTOMY/MAF  (N/A, 9/10/2014); and COLONOSCOPY WITH POLYPECTOMY (N/A, 9/10/2014). Social History/Living Environment:     Lives with family in 2 story home  Prior Level of Function/Work/Activity:  Functioned independently without limitations. Ambulatory/Rehab Services H2 Model Falls Risk Assessment    Risk Factors:       (1)  Gender [Male] Ability to Rise from Chair:       (0)  Ability to rise in a single movement    Falls Prevention Plan:       No modifications necessary   Total: (5 or greater = High Risk): 1    ©2010 [facility] [employer]. All Rights Reserved. [facility] Patent #[id number],162. Federal Law prohibits the replication, distribution or use without written permission from [facility] [employer]     Current Medications:       Current Outpatient Medications:     rosuvastatin (CRESTOR) 10 mg tablet, TAKE 1 TABLET BY MOUTH DAILY, Disp: 90 Tab, Rfl: 1    multivitamin (ONE A DAY) tablet, Take 1 tablet by mouth daily. , Disp: , Rfl:     ibuprofen (MOTRIN) 200 mg tablet, Take 200 mg by mouth every six (6) hours as needed for Pain. Hold till after surgery   Indications: PAIN, Disp: , Rfl:     cyanocobalamin (VITAMIN B12) 500 mcg tablet, Take 500 mcg by mouth daily. , Disp: , Rfl:    Date Last Reviewed:  12/6/2018   Number of Personal Factors/Comorbidities that affect the Plan of Care: 0: LOW COMPLEXITY   EXAMINATION:   Observation:       Palpation:  Upper Trapezius:-- Levator Scapulae:-- Posterior Cervical: tender on R side   Sternocleidomastoid: Tender on R side Scalenes: Tender on R side Other:       Flexibility: Limited suboccipitals, SCM, scalenes bilaterally      Range of Motion: Cervical in degrees  Flexion: 50                                        Extesnion: 30                       Right                     Left   Rotation 55 pain 50 pain   Side Bend -- --       Strength: Manual Muscle Test: upper quarter screen 5/5 MMT bilaterally. Cervical Flexion Endurance Test: --    Special Testing:  Spurlings Right: Left:   Upper limb Tension Test (ULTT1) -- --   Cervical Distraction -- --   Cervical flexion rotation test (+) (+)   Other:       Neuro: reflexes 2+ bilateral UE   Body Structures Involved:  1. Joints  2. Muscles Body Functions Affected:  1. Neuromusculoskeletal  2. Movement Related Activities and Participation Affected:  1. Mobility  2. Self Care   Number of elements (examined above) that affect the Plan of Care: 1-2: LOW COMPLEXITY   CLINICAL PRESENTATION:   Presentation: Stable and uncomplicated: LOW COMPLEXITY   CLINICAL DECISION MAKING:   Outcome Measure: Tool Used: Neck Disability Index (NDI)  Score:  Initial: 11/50  Most Recent: X/50 (Date: -- )   Interpretation of Score: The Neck Disability Index is a revised form of the Oswestry Low Back Pain Index and is designed to measure the activities of daily living in person's with neck pain. Each section is scored on a 0-5 scale, 5 representing the greatest disability.   The scores of each section are added together for a total score of 50. Score 0 1-10 11-20 21-30 31-40 41-49 50   Modifier CH CI CJ CK CL CM CN     ? Changing and Maintaining Body Position:     - CURRENT STATUS: CJ - 20%-39% impaired, limited or restricted    - GOAL STATUS: CI - 1%-19% impaired, limited or restricted    - D/C STATUS:  ---------------To be determined---------------    Medical Necessity:   · Patient is expected to demonstrate progress in strength and range of motion to increase independence with sleeping, driving, and ADL's. Reason for Services/Other Comments:  · Patient will benefit from skilled PT to address impairments identified through evaluation and return to previous ADL and recreational capacity. Use of outcome tool(s) and clinical judgement create a POC that gives a: Clear prediction of patient's progress: LOW COMPLEXITY            TREATMENT:   (In addition to Assessment/Re-Assessment sessions the following treatments were rendered)  Pre-treatment Symptoms/Complaints:  Pt. Denies new symptoms this week. Pain: Initial:   Pain Intensity 1: 7 /10 Post Session:  7/10     Therapeutic Exercise: (25 Minutes):  Exercises per grid below to improve mobility and strength. Required moderate verbal and manual cues to promote proper body alignment and promote proper body posture. Progressed range as indicated. Date:  12/6/2018   Activity/Exercise Parameters   Cervical rotation 6 minutes   Chin tucks (central and in slight rotation bilaterally) 5 minutes   Cervical Extension SNAGS 3 x 10   Rows (green band) --   Foam roll (thoracic extension, chest stretch) --   Isometric cervical extensions (green band) 4 minutes   Bird dog --   Cervical rotation SNAGS 3 x 10   Manual Therapy (    Soft Tissue Mobilization Duration  Duration: 30 Minutes): Manual techniques to facilitate improved motion and decreased pain.  (Used abbreviations: MET - muscle energy technique; PNF - proprioceptive neuromuscular facilitation; NMR - neuromuscular re-education; a/p - anterior to posterior; p/a - posterior to anterior)   · Soft tissue mobilization: R splenius capitis, sternocleidomastoid, scalenes  · Joint mobilization: C1/2 in prone unilateral p/a on the R side. Grade III-IV; C0/C1 flexion grade III  · Joint mobilization: Grade V C1/2 rotation in supine to the R side. · Manual cervical traction    [facility] Portal  Treatment/Session Assessment:    · Response to Treatment:  Cervical ROM continues to improve but pain remains consistent in the Right sternocleidomastoid muscle. Pt. Is advised to continue current HEP at the time. · Compliance with Program/Exercises: Compliant most of the time. · Recommendations/Intent for next treatment session: \"Next visit will focus on manual therapy and ROM exercises. \".   Total Treatment Duration: 55 minutes total time  PT Patient Time In/Time Out  Time In: 1400  Time Out: [address] [facility] AYLIN Ferris

## 2018-12-10 ENCOUNTER — APPOINTMENT (OUTPATIENT)
Dept: PHYSICAL THERAPY | Age: 68
End: 2018-12-10
Payer: MEDICARE

## 2018-12-13 ENCOUNTER — HOSPITAL ENCOUNTER (OUTPATIENT)
Dept: PHYSICAL THERAPY | Age: 68
Discharge: HOME | End: 2018-12-13
Payer: MEDICARE

## 2018-12-13 PROCEDURE — 97140 MANUAL THERAPY 1/> REGIONS: CPT

## 2018-12-13 PROCEDURE — 97110 THERAPEUTIC EXERCISES: CPT

## 2018-12-13 NOTE — PROGRESS NOTES
Ana Blood  : 1950  [place]  [place]: [place] [address] at [address]  [phone]   [phone]        OUTPATIENT PHYSICAL THERAPY:Daily Note 2018   ICD-10: Treatment Diagnosis: Cervicalgia [M54.2]; Headache [R51]  Precautions/Allergies:   Patient has no known allergies. MD Orders: Evaluate and Treat MEDICAL/REFERRING DIAGNOSIS:  Neck pain [M54.2]   DATE OF ONSET: 2018  REFERRING PHYSICIAN: Daniela Walker MD  RETURN PHYSICIAN APPOINTMENT: TBD     INITIAL ASSESSMENT:  Mr. Mo Avalos presents with signs of R sided neck pain. Mobility restrictions are present in the upper cervical spine bilaterally with pain present during right sided cervical rotation. Pt. Demonstrates moderate myofascial pain additionally in the R splenius capitis, R sternocleidomastoid, and R scalene musculature. Pt. Will benefit from skilled PT including manual therapy, range of motion, and mobility exercises to address impairments identified. PROBLEM LIST (Impacting functional limitations):  1. Decreased Strength  2. Increased Pain  3. Decreased Activity Tolerance  4. Decreased Flexibility/Joint Mobility  5. Decreased [place] with Home Exercise Program INTERVENTIONS PLANNED:  1. Home Exercise Program (HEP)  2. Manual Therapy  3. Range of Motion (ROM)  4. Therapeutic Exercise/Strengthening   TREATMENT PLAN:  Effective Dates: 2018 TO 2019 (90 days). Frequency/Duration: 2 times a week for 90 Day(s)  GOALS: (Goals have been discussed and agreed upon with patient.)  Discharge Goals: Time Frame: 8 weeks  1. Pt. Will demonstrate 60 degrees of cervical rotation bilaterally to improve driving safety. 2. Pt. Will demonstrate <3/10 R sided neck pain with 8 hours of sleep to improve pain. 3. Pt. Will score < 12% on the NDI to demonstrate improved pain and function.    Rehabilitation Potential For Stated Goals: Good              The information in this section was collected on 11/9/18 (except where otherwise noted). HISTORY:   History of Present Injury/Illness (Reason for Referral):  Pt. Attends PT c/o R sided neck pain with gradual insidious onset about a year ago. The pain is located along the R side of the neck and aggravated with turning the head, sleeping at night, and performing daily activities. Pt. Denies injury or numbness tingling, difficulty speaking, tinnitus, or difficulty swallowing. Pt. Notes only treatment so far was massage that helped for a few hours. Pt. Would like to address pain and return to previous level of function. Past Medical History/Comorbidities:   Mr. Yuridia Lofton  has a past medical history of Bradycardia, HLD (hyperlipidemia), Osteoarthritis, and Personal history of colonic polyps. Mr. Yuridia Lofton  has a past surgical history that includes hx gi (2007); hx colonoscopy (9/10/14); pr abdomen surgery proc unlisted; FISTULOTOMY/MAF  (N/A, 9/10/2014); and COLONOSCOPY WITH POLYPECTOMY (N/A, 9/10/2014). Social History/Living Environment:     Lives with family in 2 story home  Prior Level of Function/Work/Activity:  Functioned independently without limitations. Ambulatory/Rehab Services H2 Model Falls Risk Assessment    Risk Factors:       (1)  Gender [Male] Ability to Rise from Chair:       (0)  Ability to rise in a single movement    Falls Prevention Plan:       No modifications necessary   Total: (5 or greater = High Risk): 1    ©2010 [facility] [employer]. All Rights Reserved. [facility] Patent #[id number]. Federal Law prohibits the replication, distribution or use without written permission from [facility] [employer]     Current Medications:       Current Outpatient Medications:     rosuvastatin (CRESTOR) 10 mg tablet, TAKE 1 TABLET BY MOUTH DAILY, Disp: 90 Tab, Rfl: 1    multivitamin (ONE A DAY) tablet, Take 1 tablet by mouth daily. , Disp: , Rfl:     ibuprofen (MOTRIN) 200 mg tablet, Take 200 mg by mouth every six (6) hours as needed for Pain. Hold till after surgery   Indications: PAIN, Disp: , Rfl:     cyanocobalamin (VITAMIN B12) 500 mcg tablet, Take 500 mcg by mouth daily. , Disp: , Rfl:    Date Last Reviewed:  12/13/2018   Number of Personal Factors/Comorbidities that affect the Plan of Care: 0: LOW COMPLEXITY   EXAMINATION:   Observation:       Palpation:  Upper Trapezius:-- Levator Scapulae:-- Posterior Cervical: tender on R side   Sternocleidomastoid: Tender on R side Scalenes: Tender on R side Other:       Flexibility: Limited suboccipitals, SCM, scalenes bilaterally      Range of Motion: Cervical in degrees  Flexion: 50                                        Extesnion: 30                       Right                     Left   Rotation 55 pain 50 pain   Side Bend -- --       Strength: Manual Muscle Test: upper quarter screen 5/5 MMT bilaterally. Cervical Flexion Endurance Test: --    Special Testing:  Spurlings Right: Left:   Upper limb Tension Test (ULTT1) -- --   Cervical Distraction -- --   Cervical flexion rotation test (+) (+)   Other:       Neuro: reflexes 2+ bilateral UE   Body Structures Involved:  1. Joints  2. Muscles Body Functions Affected:  1. Neuromusculoskeletal  2. Movement Related Activities and Participation Affected:  1. Mobility  2. Self Care   Number of elements (examined above) that affect the Plan of Care: 1-2: LOW COMPLEXITY   CLINICAL PRESENTATION:   Presentation: Stable and uncomplicated: LOW COMPLEXITY   CLINICAL DECISION MAKING:   Outcome Measure: Tool Used: Neck Disability Index (NDI)  Score:  Initial: 11/50  Most Recent: X/50 (Date: -- )   Interpretation of Score: The Neck Disability Index is a revised form of the Oswestry Low Back Pain Index and is designed to measure the activities of daily living in person's with neck pain. Each section is scored on a 0-5 scale, 5 representing the greatest disability. The scores of each section are added together for a total score of 50. Score 0 1-10 11-20 21-30 31-40 41-49 50   Modifier CH CI CJ CK CL CM CN     ? Changing and Maintaining Body Position:     - CURRENT STATUS: CJ - 20%-39% impaired, limited or restricted    - GOAL STATUS: CI - 1%-19% impaired, limited or restricted    - D/C STATUS:  ---------------To be determined---------------    Medical Necessity:   · Patient is expected to demonstrate progress in strength and range of motion to increase independence with sleeping, driving, and ADL's. Reason for Services/Other Comments:  · Patient will benefit from skilled PT to address impairments identified through evaluation and return to previous ADL and recreational capacity. Use of outcome tool(s) and clinical judgement create a POC that gives a: Clear prediction of patient's progress: LOW COMPLEXITY            TREATMENT:   (In addition to Assessment/Re-Assessment sessions the following treatments were rendered)  Pre-treatment Symptoms/Complaints:  Pt. Reports improved pain levels and cervical rotation over the past week. Pain: Initial:   Pain Intensity 1: 6 /10 Post Session:  6/10     Therapeutic Exercise: (25 Minutes):  Exercises per grid below to improve mobility and strength. Required moderate verbal and manual cues to promote proper body alignment and promote proper body posture. Progressed range as indicated.      Date:  12/13/2018   Activity/Exercise Parameters   Cervical rotation 6 minutes   Chin tucks (central and in slight rotation bilaterally) 5 minutes   Cervical Extension SNAGS --   Rows (green band) --   Foam roll (thoracic extension, chest stretch) --   Isometric cervical extensions (green band) 4 minutes   Bird dog 3 x 20   Cervical rotation SNAGS 3 x 10   Curl ups with chin tuck on incline 4 x 10   Manual Therapy (    Soft Tissue Mobilization Duration  Duration: 30 Minutes): Manual techniques to facilitate improved motion and decreased pain. (Used abbreviations: MET - muscle energy technique; PNF - proprioceptive neuromuscular facilitation; NMR - neuromuscular re-education; a/p - anterior to posterior; p/a - posterior to anterior)   · Soft tissue mobilization: R splenius capitis, sternocleidomastoid, scalenes  · Joint mobilization: C1/2 in prone unilateral p/a on the R side. Grade III-IV; C0/C1 flexion grade III  · Joint mobilization: Grade V C1/2 rotation in supine to the R side. · Manual cervical traction    [facility]  Treatment/Session Assessment:    · Response to Treatment:  Pt. Demonstrates improved cervical ROM in all directions. Pt. Presents with decreased cervical flexion/sidebend compensation this week with R sided rotation. · Compliance with Program/Exercises: Compliant most of the time. · Recommendations/Intent for next treatment session: \"Next visit will focus on manual therapy and ROM exercises. \".   Total Treatment Duration: 55 minutes total time  PT Patient Time In/Time Out  Time In: 1100  Time Out: 24[facility]

## 2018-12-18 ENCOUNTER — HOSPITAL ENCOUNTER (OUTPATIENT)
Dept: PHYSICAL THERAPY | Age: 68
Discharge: HOME | End: 2018-12-18
Payer: MEDICARE

## 2018-12-18 PROCEDURE — 97110 THERAPEUTIC EXERCISES: CPT

## 2018-12-18 PROCEDURE — 97140 MANUAL THERAPY 1/> REGIONS: CPT

## 2018-12-18 NOTE — PROGRESS NOTES
Carmelina Montoya  : 1950  [place]  [place]: [place] [address] at [facility]  [address], [address]  [phone]   [phone]        OUTPATIENT PHYSICAL THERAPY:Daily Note 2018   ICD-10: Treatment Diagnosis: Cervicalgia [M54.2]; Headache [R51]  Precautions/Allergies:   Patient has no known allergies. MD Orders: Evaluate and Treat MEDICAL/REFERRING DIAGNOSIS:  Neck pain [M54.2]   DATE OF ONSET: 2018  REFERRING PHYSICIAN: Roslyn Pope MD  RETURN PHYSICIAN APPOINTMENT: TBD     INITIAL ASSESSMENT:  Mr. Stefan Chatterjee presents with signs of R sided neck pain. Mobility restrictions are present in the upper cervical spine bilaterally with pain present during right sided cervical rotation. Pt. Demonstrates moderate myofascial pain additionally in the R splenius capitis, R sternocleidomastoid, and R scalene musculature. Pt. Will benefit from skilled PT including manual therapy, range of motion, and mobility exercises to address impairments identified. PROBLEM LIST (Impacting functional limitations):  1. Decreased Strength  2. Increased Pain  3. Decreased Activity Tolerance  4. Decreased Flexibility/Joint Mobility  5. Decreased [place] with Home Exercise Program INTERVENTIONS PLANNED:  1. Home Exercise Program (HEP)  2. Manual Therapy  3. Range of Motion (ROM)  4. Therapeutic Exercise/Strengthening   TREATMENT PLAN:  Effective Dates: 2018 TO 2019 (90 days). Frequency/Duration: 2 times a week for 90 Day(s)  GOALS: (Goals have been discussed and agreed upon with patient.)  Discharge Goals: Time Frame: 8 weeks  1. Pt. Will demonstrate 60 degrees of cervical rotation bilaterally to improve driving safety. 2. Pt. Will demonstrate <3/10 R sided neck pain with 8 hours of sleep to improve pain. 3. Pt. Will score < 12% on the NDI to demonstrate improved pain and function.    Rehabilitation Potential For Stated Goals: Good              The information in this section was collected on 11/9/18 (except where otherwise noted). HISTORY:   History of Present Injury/Illness (Reason for Referral):  Pt. Attends PT c/o R sided neck pain with gradual insidious onset about a year ago. The pain is located along the R side of the neck and aggravated with turning the head, sleeping at night, and performing daily activities. Pt. Denies injury or numbness tingling, difficulty speaking, tinnitus, or difficulty swallowing. Pt. Notes only treatment so far was massage that helped for a few hours. Pt. Would like to address pain and return to previous level of function. Past Medical History/Comorbidities:   Mr. Gisell Warren  has a past medical history of Bradycardia, HLD (hyperlipidemia), Osteoarthritis, and Personal history of colonic polyps. Mr. Gisell Warren  has a past surgical history that includes hx gi (2007); hx colonoscopy (9/10/14); pr abdomen surgery proc unlisted; FISTULOTOMY/MAF  (N/A, 9/10/2014); and COLONOSCOPY WITH POLYPECTOMY (N/A, 9/10/2014). Social History/Living Environment:     Lives with family in 2 story home  Prior Level of Function/Work/Activity:  Functioned independently without limitations. Ambulatory/Rehab Services H2 Model Falls Risk Assessment    Risk Factors:       (1)  Gender [Male] Ability to Rise from Chair:       (0)  Ability to rise in a single movement    Falls Prevention Plan:       No modifications necessary   Total: (5 or greater = High Risk): 1    ©2010 [facility] [employer]. All Rights Reserved. [facility] States Patent #[id number]. Federal Law prohibits the replication, distribution or use without written permission from [facility] [employer]     Current Medications:       Current Outpatient Medications:     rosuvastatin (CRESTOR) 10 mg tablet, TAKE 1 TABLET BY MOUTH DAILY, Disp: 90 Tab, Rfl: 1    multivitamin (ONE A DAY) tablet, Take 1 tablet by mouth daily. , Disp: , Rfl:     ibuprofen (MOTRIN) 200 mg tablet, Take 200 mg by mouth every six (6) hours as needed for Pain. Hold till after surgery   Indications: PAIN, Disp: , Rfl:     cyanocobalamin (VITAMIN B12) 500 mcg tablet, Take 500 mcg by mouth daily. , Disp: , Rfl:    Date Last Reviewed:  12/18/2018   Number of Personal Factors/Comorbidities that affect the Plan of Care: 0: LOW COMPLEXITY   EXAMINATION:   Observation:       Palpation:  Upper Trapezius:-- Levator Scapulae:-- Posterior Cervical: tender on R side   Sternocleidomastoid: Tender on R side Scalenes: Tender on R side Other:       Flexibility: Limited suboccipitals, SCM, scalenes bilaterally      Range of Motion: Cervical in degrees  Flexion: 50                                        Extesnion: 30                       Right                     Left   Rotation 55 pain 50 pain   Side Bend -- --       Strength: Manual Muscle Test: upper quarter screen 5/5 MMT bilaterally. Cervical Flexion Endurance Test: --    Special Testing:  Spurlings Right: Left:   Upper limb Tension Test (ULTT1) -- --   Cervical Distraction -- --   Cervical flexion rotation test (+) (+)   Other:       Neuro: reflexes 2+ bilateral UE   Body Structures Involved:  1. Joints  2. Muscles Body Functions Affected:  1. Neuromusculoskeletal  2. Movement Related Activities and Participation Affected:  1. Mobility  2. Self Care   Number of elements (examined above) that affect the Plan of Care: 1-2: LOW COMPLEXITY   CLINICAL PRESENTATION:   Presentation: Stable and uncomplicated: LOW COMPLEXITY   CLINICAL DECISION MAKING:   Outcome Measure: Tool Used: Neck Disability Index (NDI)  Score:  Initial: 11/50  Most Recent: X/50 (Date: -- )   Interpretation of Score: The Neck Disability Index is a revised form of the Oswestry Low Back Pain Index and is designed to measure the activities of daily living in person's with neck pain. Each section is scored on a 0-5 scale, 5 representing the greatest disability. The scores of each section are added together for a total score of 50. Score 0 1-10 11-20 21-30 31-40 41-49 50   Modifier CH CI CJ CK CL CM CN     ? Changing and Maintaining Body Position:     - CURRENT STATUS: CJ - 20%-39% impaired, limited or restricted    - GOAL STATUS: CI - 1%-19% impaired, limited or restricted    - D/C STATUS:  ---------------To be determined---------------    Medical Necessity:   · Patient is expected to demonstrate progress in strength and range of motion to increase independence with sleeping, driving, and ADL's. Reason for Services/Other Comments:  · Patient will benefit from skilled PT to address impairments identified through evaluation and return to previous ADL and recreational capacity. Use of outcome tool(s) and clinical judgement create a POC that gives a: Clear prediction of patient's progress: LOW COMPLEXITY            TREATMENT:   (In addition to Assessment/Re-Assessment sessions the following treatments were rendered)  Pre-treatment Symptoms/Complaints:  Pt. Notes mild increase in R neck pain this week. Pain: Initial:   Pain Intensity 1: 6 /10 Post Session:  4/10     Therapeutic Exercise: (25 Minutes):  Exercises per grid below to improve mobility and strength. Required moderate verbal and manual cues to promote proper body alignment and promote proper body posture. Progressed range as indicated. Date:  12/18/2018   Activity/Exercise Parameters   Cervical rotation 6 minutes   Chin tucks (central and in slight rotation bilaterally) 5 minutes   Cervical Extension SNAGS 3 x 10   Rows (green band) --   Foam roll (thoracic extension, chest stretch) --   Isometric cervical extensions (green band) --   Bird dog 3 x 20   Cervical rotation SNAGS 3 x 10   Curl ups with chin tuck on incline 4 x 10   Manual Therapy (    Soft Tissue Mobilization Duration  Duration: 30 Minutes): Manual techniques to facilitate improved motion and decreased pain.  (Used abbreviations: MET - muscle energy technique; PNF - proprioceptive neuromuscular facilitation; NMR - neuromuscular re-education; a/p - anterior to posterior; p/a - posterior to anterior)   · Soft tissue mobilization: R splenius capitis, sternocleidomastoid, scalenes  · Joint mobilization: C1/2 in prone unilateral p/a on the R side. Grade III-IV; C0/C1 flexion grade III  · Joint mobilization: Grade V C1/2 rotation in supine to the R side. · Manual cervical traction    [facility]  Treatment/Session Assessment:    · Response to Treatment:  Subjective pain improves within session as patient tends to respond to ROM exercises. Pt. Remains tender to palpation of the R SCM, splenius capitis, scalenes. · Compliance with Program/Exercises: Compliant most of the time. · Recommendations/Intent for next treatment session: \"Next visit will focus on manual therapy and ROM exercises. \".   Total Treatment Duration: 55 minutes total time  PT Patient Time In/Time Out  Time In: 1500  Time Out: Silvano Lima [id number], PT

## 2018-12-20 ENCOUNTER — HOSPITAL ENCOUNTER (OUTPATIENT)
Dept: PHYSICAL THERAPY | Age: 68
Discharge: HOME | End: 2018-12-20
Payer: MEDICARE

## 2018-12-20 PROCEDURE — 97140 MANUAL THERAPY 1/> REGIONS: CPT

## 2018-12-20 PROCEDURE — G8981 BODY POS CURRENT STATUS: HCPCS

## 2018-12-20 PROCEDURE — G8982 BODY POS GOAL STATUS: HCPCS

## 2018-12-20 PROCEDURE — 97110 THERAPEUTIC EXERCISES: CPT

## 2018-12-20 NOTE — PROGRESS NOTES
Molly Kang  : 1950  [place]  [place] [address] at [address]  [phone]   [phone]        OUTPATIENT PHYSICAL THERAPY:Daily Note and Progress Report 2018   ICD-10: Treatment Diagnosis: Cervicalgia [M54.2]; Headache [R51]  Precautions/Allergies:   Patient has no known allergies. MD Orders: Evaluate and Treat MEDICAL/REFERRING DIAGNOSIS:  Neck pain [M54.2]   DATE OF ONSET: 2018  REFERRING PHYSICIAN: Gabbie Amador MD  RETURN PHYSICIAN APPOINTMENT: TBD     INITIAL ASSESSMENT:  Mr. Brandyn Cordero presents with signs of R sided neck pain. Mobility restrictions are present in the upper cervical spine bilaterally with pain present during right sided cervical rotation. Pt. Demonstrates moderate myofascial pain additionally in the R splenius capitis, R sternocleidomastoid, and R scalene musculature. Pt. Will benefit from skilled PT including manual therapy, range of motion, and mobility exercises to address impairments identified. 18 Progress Report: Pt. Attends 10 physical therapy visits. Pt. Demonstrates improved cervical ROM in all directions. Despite improved cervical ROM, pain remains in the R posterior cervical region and R Sternocleidomastoid with cervical rotations movements bilaterally. Upper cervical joint mobility has improved but patient remains positive with the upper cervical flexion rotation test.  Pt. Will benefit from continued manual therapy and deep neck flexor strengthening to address remaining pain. PROBLEM LIST (Impacting functional limitations):  1. Decreased Strength  2. Increased Pain  3. Decreased Activity Tolerance  4. Decreased Flexibility/Joint Mobility  5. Decreased [place] with Home Exercise Program INTERVENTIONS PLANNED:  1. Home Exercise Program (HEP)  2. Manual Therapy  3. Range of Motion (ROM)  4.  Therapeutic Exercise/Strengthening   TREATMENT PLAN:  Effective Dates: 11/9/2018 TO 2/7/2019 (90 days). Frequency/Duration: 2 times a week for 90 Day(s)  GOALS: (Goals have been discussed and agreed upon with patient.)  Discharge Goals: Time Frame: 8 weeks  1. Pt. Will demonstrate 60 degrees of cervical rotation bilaterally to improve driving safety. MET  2. Pt. Will demonstrate <3/10 R sided neck pain with 8 hours of sleep to improve pain. NOT MET  3. Pt. Will score < 12% on the NDI to demonstrate improved pain and function. NOT MET  Rehabilitation Potential For Stated Goals: Good              The information in this section was collected on 11/9/18 (except where otherwise noted). HISTORY:   History of Present Injury/Illness (Reason for Referral):  Pt. Attends PT c/o R sided neck pain with gradual insidious onset about a year ago. The pain is located along the R side of the neck and aggravated with turning the head, sleeping at night, and performing daily activities. Pt. Denies injury or numbness tingling, difficulty speaking, tinnitus, or difficulty swallowing. Pt. Notes only treatment so far was massage that helped for a few hours. Pt. Would like to address pain and return to previous level of function. Past Medical History/Comorbidities:   Mr. Dandre Burciaga  has a past medical history of Bradycardia, HLD (hyperlipidemia), Osteoarthritis, and Personal history of colonic polyps. Mr. Dandre Burciaga  has a past surgical history that includes hx gi (2007); hx colonoscopy (9/10/14); pr abdomen surgery proc unlisted; FISTULOTOMY/MAF  (N/A, 9/10/2014); and COLONOSCOPY WITH POLYPECTOMY (N/A, 9/10/2014). Social History/Living Environment:     Lives with family in 2 story home  Prior Level of Function/Work/Activity:  Functioned independently without limitations.       Ambulatory/Rehab Services H2 Model Falls Risk Assessment    Risk Factors:       (1)  Gender [Male] Ability to Rise from Chair:       (0)  Ability to rise in a single movement    Falls Prevention Plan:       No modifications necessary   Total: (5 or greater = High Risk): 1    ©2010 [facility] [employer]. All Rights Reserved. [place] States Patent #[id number]. Federal Law prohibits the replication, distribution or use without written permission from [facility] [employer]     Current Medications:       Current Outpatient Medications:     rosuvastatin (CRESTOR) 10 mg tablet, TAKE 1 TABLET BY MOUTH DAILY, Disp: 90 Tab, Rfl: 1    multivitamin (ONE A DAY) tablet, Take 1 tablet by mouth daily. , Disp: , Rfl:     ibuprofen (MOTRIN) 200 mg tablet, Take 200 mg by mouth every six (6) hours as needed for Pain. Hold till after surgery   Indications: PAIN, Disp: , Rfl:     cyanocobalamin (VITAMIN B12) 500 mcg tablet, Take 500 mcg by mouth daily. , Disp: , Rfl:    Date Last Reviewed:  12/20/2018   Number of Personal Factors/Comorbidities that affect the Plan of Care: 0: LOW COMPLEXITY   EXAMINATION:   Observation:       Palpation:  Upper Trapezius:-- Levator Scapulae:-- Posterior Cervical: tender on R side   Sternocleidomastoid: Tender on R side Scalenes: Tender on R side Other:       Flexibility: Limited suboccipitals, SCM, scalenes bilaterally      Range of Motion: Cervical in degrees  Flexion: 45                                        Extesnion: 40                       Right                     Left   Rotation 64 pain 65 pain   Side Bend -- --       Strength: Manual Muscle Test: upper quarter screen 5/5 MMT bilaterally. Cervical Flexion Endurance Test: --    Special Testing:  Spurlings Right: Left:   Upper limb Tension Test (ULTT1) -- --   Cervical Distraction -- --   Cervical flexion rotation test (+) (+)   Other:       Neuro: reflexes 2+ bilateral UE   Body Structures Involved:  1. Joints  2. Muscles Body Functions Affected:  1. Neuromusculoskeletal  2. Movement Related Activities and Participation Affected:  1. Mobility  2.  Self Care   Number of elements (examined above) that affect the Plan of Care: 1-2: LOW COMPLEXITY   CLINICAL PRESENTATION:   Presentation: Stable and uncomplicated: LOW COMPLEXITY   CLINICAL DECISION MAKING:   Outcome Measure: Tool Used: Neck Disability Index (NDI)  Score:  Initial: 11/50  Most Recent: 10/50 (Date: 12/20/18 )   Interpretation of Score: The Neck Disability Index is a revised form of the Oswestry Low Back Pain Index and is designed to measure the activities of daily living in person's with neck pain. Each section is scored on a 0-5 scale, 5 representing the greatest disability. The scores of each section are added together for a total score of 50. Score 0 1-10 11-20 21-30 31-40 41-49 50   Modifier CH CI CJ CK CL CM CN     ? Changing and Maintaining Body Position:    [id number] - CURRENT STATUS: CI - 1%-19% impaired, limited or restricted    - GOAL STATUS: CI - 1%-19% impaired, limited or restricted    - D/C STATUS:  ---------------To be determined---------------    Medical Necessity:   · Patient is expected to demonstrate progress in strength and range of motion to increase independence with sleeping, driving, and ADL's. Reason for Services/Other Comments:  · Patient will benefit from skilled PT to address impairments identified through evaluation and return to previous ADL and recreational capacity. Use of outcome tool(s) and clinical judgement create a POC that gives a: Clear prediction of patient's progress: LOW COMPLEXITY            TREATMENT:   (In addition to Assessment/Re-Assessment sessions the following treatments were rendered)  Pre-treatment Symptoms/Complaints:  Pt. Denies new symptoms this week. Pain: Initial:   Pain Intensity 1: 6 /10 Post Session:  5/10     Therapeutic Exercise: (20 Minutes):  Exercises per grid below to improve mobility and strength. Required moderate verbal and manual cues to promote proper body alignment and promote proper body posture. Progressed range as indicated. Date:  12/20/2018   Activity/Exercise Parameters   Cervical rotation 6 minutes   Chin tucks (central and in slight rotation bilaterally) 5 minutes   Cervical Extension SNAGS --   Rows (green band) --   Foam roll (thoracic extension, chest stretch) --   Isometric cervical extensions (green band) --   Bird dog 3 x 20   Cervical rotation SNAGS 3 x 10   Curl ups with chin tuck on incline --   Manual Therapy (    Soft Tissue Mobilization Duration  Duration: 40 Minutes): Manual techniques to facilitate improved motion and decreased pain. (Used abbreviations: MET - muscle energy technique; PNF - proprioceptive neuromuscular facilitation; NMR - neuromuscular re-education; a/p - anterior to posterior; p/a - posterior to anterior)   · Soft tissue mobilization: R splenius capitis, sternocleidomastoid, scalenes  · Joint mobilization: C1/2 in prone unilateral p/a on the R side. Grade III-IV; C0/C1 flexion grade III  · Joint mobilization: Grade V C1/2 rotation in supine to the R side. · Manual cervical traction  · Soft tissue mobilization: Instrument assisted soft tissue mobilization:  R splenius capitis, R cervical paraspinals C3-5    [employer] Portal  Treatment/Session Assessment:    · Response to Treatment:  Pt. Is initiated on dry needling techniques today. Pt. tolerates dry needling without complaints and minimal post treatment soreness. Pt. Will additionally benefit from dry needling to the R sternocleidomastoid and scalene musculature. · Compliance with Program/Exercises: Compliant most of the time. · Recommendations/Intent for next treatment session: \"Next visit will focus on manual therapy and ROM exercises. \".   Total Treatment Duration: 60 minutes total time  PT Patient Time In/Time Out  Time In: 1500  Time Out: Silvano Lima [id number], PT

## 2018-12-26 ENCOUNTER — HOSPITAL ENCOUNTER (OUTPATIENT)
Dept: PHYSICAL THERAPY | Age: 68
Discharge: HOME | End: 2018-12-26
Payer: MEDICARE

## 2018-12-26 PROCEDURE — 97110 THERAPEUTIC EXERCISES: CPT

## 2018-12-26 PROCEDURE — 97140 MANUAL THERAPY 1/> REGIONS: CPT

## 2018-12-26 NOTE — PROGRESS NOTES
Michaela Rice  : 1950  [place]  [place] [address] at [address]  [phone]   [phone]        OUTPATIENT PHYSICAL THERAPY:Daily Note 2018   ICD-10: Treatment Diagnosis: Cervicalgia [M54.2]; Headache [R51]  Precautions/Allergies:   Patient has no known allergies. MD Orders: Evaluate and Treat MEDICAL/REFERRING DIAGNOSIS:  Neck pain [M54.2]   DATE OF ONSET: 2018  REFERRING PHYSICIAN: Charles Merritt MD  RETURN PHYSICIAN APPOINTMENT: TBD     INITIAL ASSESSMENT:  Mr. Riya Art presents with signs of R sided neck pain. Mobility restrictions are present in the upper cervical spine bilaterally with pain present during right sided cervical rotation. Pt. Demonstrates moderate myofascial pain additionally in the R splenius capitis, R sternocleidomastoid, and R scalene musculature. Pt. Will benefit from skilled PT including manual therapy, range of motion, and mobility exercises to address impairments identified. 18 Progress Report: Pt. Attends 10 physical therapy visits. Pt. Demonstrates improved cervical ROM in all directions. Despite improved cervical ROM, pain remains in the R posterior cervical region and R Sternocleidomastoid with cervical rotations movements bilaterally. Upper cervical joint mobility has improved but patient remains positive with the upper cervical flexion rotation test.  Pt. Will benefit from continued manual therapy and deep neck flexor strengthening to address remaining pain. PROBLEM LIST (Impacting functional limitations):  1. Decreased Strength  2. Increased Pain  3. Decreased Activity Tolerance  4. Decreased Flexibility/Joint Mobility  5. Decreased [place] with Home Exercise Program INTERVENTIONS PLANNED:  1. Home Exercise Program (HEP)  2. Manual Therapy  3. Range of Motion (ROM)  4.  Therapeutic Exercise/Strengthening TREATMENT PLAN:  Effective Dates: 11/9/2018 TO 2/7/2019 (90 days). Frequency/Duration: 2 times a week for 90 Day(s)  GOALS: (Goals have been discussed and agreed upon with patient.)  Discharge Goals: Time Frame: 8 weeks  1. Pt. Will demonstrate 60 degrees of cervical rotation bilaterally to improve driving safety. MET  2. Pt. Will demonstrate <3/10 R sided neck pain with 8 hours of sleep to improve pain. NOT MET  3. Pt. Will score < 12% on the NDI to demonstrate improved pain and function. NOT MET  Rehabilitation Potential For Stated Goals: Good              The information in this section was collected on 11/9/18 (except where otherwise noted). HISTORY:   History of Present Injury/Illness (Reason for Referral):  Pt. Attends PT c/o R sided neck pain with gradual insidious onset about a year ago. The pain is located along the R side of the neck and aggravated with turning the head, sleeping at night, and performing daily activities. Pt. Denies injury or numbness tingling, difficulty speaking, tinnitus, or difficulty swallowing. Pt. Notes only treatment so far was massage that helped for a few hours. Pt. Would like to address pain and return to previous level of function. Past Medical History/Comorbidities:   Mr. Myrna Burciaga  has a past medical history of Bradycardia, HLD (hyperlipidemia), Osteoarthritis, and Personal history of colonic polyps. Mr. Myrna Burciaga  has a past surgical history that includes hx gi (2007); hx colonoscopy (9/10/14); pr abdomen surgery proc unlisted; FISTULOTOMY/MAF  (N/A, 9/10/2014); and COLONOSCOPY WITH POLYPECTOMY (N/A, 9/10/2014). Social History/Living Environment:     Lives with family in [address]  Prior Level of Function/Work/Activity:  Functioned independently without limitations.       Ambulatory/Rehab Services H2 Model Falls Risk Assessment    Risk Factors:       (1)  Gender [Male] Ability to Rise from Chair:       (0)  Ability to rise in a single movement    Falls Prevention Plan:       No modifications necessary   Total: (5 or greater = High Risk): 1    ©2010 [facility] of [facility] States Patent #[id number]. Federal Law prohibits the replication, distribution or use without written permission from [facility] of [address] [place] [address]     Current Medications:       Current Outpatient Medications:     rosuvastatin (CRESTOR) 10 mg tablet, TAKE 1 TABLET BY MOUTH DAILY, Disp: 90 Tab, Rfl: 1    multivitamin (ONE A DAY) tablet, Take 1 tablet by mouth daily. , Disp: , Rfl:     ibuprofen (MOTRIN) 200 mg tablet, Take 200 mg by mouth every six (6) hours as needed for Pain. Hold till after surgery   Indications: PAIN, Disp: , Rfl:     cyanocobalamin (VITAMIN B12) 500 mcg tablet, Take 500 mcg by mouth daily. , Disp: , Rfl:    Date Last Reviewed:  12/26/2018   Number of Personal Factors/Comorbidities that affect the Plan of Care: 0: LOW COMPLEXITY   EXAMINATION:   Observation:       Palpation:  Upper Trapezius:-- Levator Scapulae:-- Posterior Cervical: tender on R side   Sternocleidomastoid: Tender on R side Scalenes: Tender on R side Other:       Flexibility: Limited suboccipitals, SCM, scalenes bilaterally      Range of Motion: Cervical in degrees  Flexion: 45                                        Extesnion: 40                       Right                     Left   Rotation 64 pain 65 pain   Side Bend -- --       Strength: Manual Muscle Test: upper quarter screen 5/5 MMT bilaterally. Cervical Flexion Endurance Test: --    Special Testing:  Spurlings Right: Left:   Upper limb Tension Test (ULTT1) -- --   Cervical Distraction -- --   Cervical flexion rotation test (+) (+)   Other:       Neuro: reflexes 2+ bilateral UE   Body Structures Involved:  1. Joints  2. Muscles Body Functions Affected:  1. Neuromusculoskeletal  2. Movement Related Activities and Participation Affected:  1. Mobility  2.  Self Care   Number of elements (examined above) that affect the Plan of Care: 1-2: LOW COMPLEXITY   CLINICAL PRESENTATION:   Presentation: Stable and uncomplicated: LOW COMPLEXITY   CLINICAL DECISION MAKING:   Outcome Measure: Tool Used: Neck Disability Index (NDI)  Score:  Initial: 11/50  Most Recent: 10/50 (Date: 12/20/18 )   Interpretation of Score: The Neck Disability Index is a revised form of the Oswestry Low Back Pain Index and is designed to measure the activities of daily living in person's with neck pain. Each section is scored on a 0-5 scale, 5 representing the greatest disability. The scores of each section are added together for a total score of 50. Score 0 1-10 11-20 21-30 31-40 41-49 50   Modifier CH CI CJ CK CL CM CN     ? Changing and Maintaining Body Position:    [id number] - CURRENT STATUS: CI - 1%-19% impaired, limited or restricted    - GOAL STATUS: CI - 1%-19% impaired, limited or restricted    - D/C STATUS:  ---------------To be determined---------------    Medical Necessity:   · Patient is expected to demonstrate progress in strength and range of motion to increase independence with sleeping, driving, and ADL's. Reason for Services/Other Comments:  · Patient will benefit from skilled PT to address impairments identified through evaluation and return to previous ADL and recreational capacity. Use of outcome tool(s) and clinical judgement create a POC that gives a: Clear prediction of patient's progress: LOW COMPLEXITY            TREATMENT:   (In addition to Assessment/Re-Assessment sessions the following treatments were rendered)  Pre-treatment Symptoms/Complaints:  Pt. Notes continued soreness in the R side of the neck with movement. Pain: Initial:   Pain Intensity 1: 6 /10 Post Session:  5/10     Therapeutic Exercise: (30 Minutes):  Exercises per grid below to improve mobility and strength. Required moderate verbal and manual cues to promote proper body alignment and promote proper body posture.   Progressed range as indicated. Date:  12/26/2018   Activity/Exercise Parameters   Cervical rotation 4 minutes   Chin tucks (central and in slight rotation bilaterally) 2 minutes   Cervical Extension SNAGS --   Rows (green band) --   Foam roll (thoracic extension, chest stretch) --   Isometric cervical extensions (green band) --   Bird dog 3 x 20   Cervical rotation SNAGS 3 x 10   Curl ups with chin tuck on swiss ball 3 x 10   T's on swiss ball with chin tuck 3 x 15   Chin tuck head lift 4 x 10       Manual Therapy (    Soft Tissue Mobilization Duration  Duration: 30 Minutes): Manual techniques to facilitate improved motion and decreased pain. (Used abbreviations: MET - muscle energy technique; PNF - proprioceptive neuromuscular facilitation; NMR - neuromuscular re-education; a/p - anterior to posterior; p/a - posterior to anterior)   · Soft tissue mobilization: R splenius capitis, sternocleidomastoid, scalenes  · Joint mobilization: C1/2 in prone unilateral p/a on the R side. Grade III-IV; C0/C1 flexion grade III  · Joint mobilization: Grade V C1/2 rotation in supine to the R side. · Manual cervical traction    [facility] Portal  Treatment/Session Assessment:    · Response to Treatment:  Palpation of the R sternocleidomastoid and posterior cervical musculature demonstrates improved myofascial trigger points today. Pain with rotation remains however. PT will consider increased dry needling next PT session     · Compliance with Program/Exercises: Compliant most of the time. · Recommendations/Intent for next treatment session: \"Next visit will focus on manual therapy and ROM exercises. \".   Total Treatment Duration: 60 minutes total time  PT Patient Time In/Time Out  Time In: 0830  Time Out: Justo Moya PT

## 2018-12-28 ENCOUNTER — APPOINTMENT (OUTPATIENT)
Dept: PHYSICAL THERAPY | Age: 68
End: 2018-12-28
Payer: MEDICARE

## 2019-01-08 ENCOUNTER — HOSPITAL ENCOUNTER (OUTPATIENT)
Dept: PHYSICAL THERAPY | Age: 69
Discharge: HOME | End: 2019-01-08
Payer: MEDICARE

## 2019-01-08 PROCEDURE — 97140 MANUAL THERAPY 1/> REGIONS: CPT

## 2019-01-08 PROCEDURE — 97110 THERAPEUTIC EXERCISES: CPT

## 2019-01-08 NOTE — PROGRESS NOTES
[facility]  : 1950  [place]  [place]: [place] [address] at [address]  [phone]   [phone]        OUTPATIENT PHYSICAL THERAPY:Daily Note 2019   ICD-10: Treatment Diagnosis: Cervicalgia [M54.2]; Headache [R51]  Precautions/Allergies:   Patient has no known allergies. MD Orders: Evaluate and Treat MEDICAL/REFERRING DIAGNOSIS:  Neck pain [M54.2]   DATE OF ONSET: 2018  REFERRING PHYSICIAN: Gaudencio Amezcua MD  RETURN PHYSICIAN APPOINTMENT: TBD     INITIAL ASSESSMENT:  Mr. Yaima Robb presents with signs of R sided neck pain. Mobility restrictions are present in the upper cervical spine bilaterally with pain present during right sided cervical rotation. Pt. Demonstrates moderate myofascial pain additionally in the R splenius capitis, R sternocleidomastoid, and R scalene musculature. Pt. Will benefit from skilled PT including manual therapy, range of motion, and mobility exercises to address impairments identified. 18 Progress Report: Pt. Attends 10 physical therapy visits. Pt. Demonstrates improved cervical ROM in all directions. Despite improved cervical ROM, pain remains in the R posterior cervical region and R Sternocleidomastoid with cervical rotations movements bilaterally. Upper cervical joint mobility has improved but patient remains positive with the upper cervical flexion rotation test.  Pt. Will benefit from continued manual therapy and deep neck flexor strengthening to address remaining pain. PROBLEM LIST (Impacting functional limitations):  1. Decreased Strength  2. Increased Pain  3. Decreased Activity Tolerance  4. Decreased Flexibility/Joint Mobility  5. Decreased [place] with Home Exercise Program INTERVENTIONS PLANNED:  1. Home Exercise Program (HEP)  2. Manual Therapy  3. Range of Motion (ROM)  4.  Therapeutic Exercise/Strengthening TREATMENT PLAN:  Effective Dates: 11/9/2018 TO 2/7/2019 (90 days). Frequency/Duration: 2 times a week for 90 Day(s)  GOALS: (Goals have been discussed and agreed upon with patient.)  Discharge Goals: Time Frame: 8 weeks  1. Pt. Will demonstrate 60 degrees of cervical rotation bilaterally to improve driving safety. MET  2. Pt. Will demonstrate <3/10 R sided neck pain with 8 hours of sleep to improve pain. NOT MET  3. Pt. Will score < 12% on the NDI to demonstrate improved pain and function. NOT MET  Rehabilitation Potential For Stated Goals: Good              The information in this section was collected on 11/9/18 (except where otherwise noted). HISTORY:   History of Present Injury/Illness (Reason for Referral):  Pt. Attends PT c/o R sided neck pain with gradual insidious onset about a year ago. The pain is located along the R side of the neck and aggravated with turning the head, sleeping at night, and performing daily activities. Pt. Denies injury or numbness tingling, difficulty speaking, tinnitus, or difficulty swallowing. Pt. Notes only treatment so far was massage that helped for a few hours. Pt. Would like to address pain and return to previous level of function. Past Medical History/Comorbidities:   Mr. Paola Flower  has a past medical history of Bradycardia, HLD (hyperlipidemia), Osteoarthritis, and Personal history of colonic polyps. Mr. Paola Flower  has a past surgical history that includes hx gi (2007); hx colonoscopy (9/10/14); pr abdomen surgery proc unlisted; FISTULOTOMY/MAF  (N/A, 9/10/2014); and COLONOSCOPY WITH POLYPECTOMY (N/A, 9/10/2014). Social History/Living Environment:     Lives with family in [address]  Prior Level of Function/Work/Activity:  Functioned independently without limitations.       Ambulatory/Rehab Services H2 Model Falls Risk Assessment    Risk Factors:       (1)  Gender [Male] Ability to Rise from Chair:       (0)  Ability to rise in a single movement    Falls Prevention Plan:       No modifications necessary   Total: (5 or greater = High Risk): 1    ©2010 [facility] of [facility] States Patent #[id number]. Federal Law prohibits the replication, distribution or use without written permission from [facility] of [facility]     Current Medications:       Current Outpatient Medications:     rosuvastatin (CRESTOR) 10 mg tablet, TAKE 1 TABLET BY MOUTH DAILY, Disp: 90 Tab, Rfl: 1    multivitamin (ONE A DAY) tablet, Take 1 tablet by mouth daily. , Disp: , Rfl:     ibuprofen (MOTRIN) 200 mg tablet, Take 200 mg by mouth every six (6) hours as needed for Pain. Hold till after surgery   Indications: PAIN, Disp: , Rfl:     cyanocobalamin (VITAMIN B12) 500 mcg tablet, Take 500 mcg by mouth daily. , Disp: , Rfl:    Date Last Reviewed:  1/8/2019   Number of Personal Factors/Comorbidities that affect the Plan of Care: 0: LOW COMPLEXITY   EXAMINATION:   Observation:       Palpation:  Upper Trapezius:-- Levator Scapulae:-- Posterior Cervical: tender on R side   Sternocleidomastoid: Tender on R side Scalenes: Tender on R side Other:       Flexibility: Limited suboccipitals, SCM, scalenes bilaterally      Range of Motion: Cervical in degrees  Flexion: 45                                        Extesnion: 40                       Right                     Left   Rotation 64 pain 65 pain   Side Bend -- --       Strength: Manual Muscle Test: upper quarter screen 5/5 MMT bilaterally. Cervical Flexion Endurance Test: --    Special Testing:  Spurlings Right: Left:   Upper limb Tension Test (ULTT1) -- --   Cervical Distraction -- --   Cervical flexion rotation test (+) (+)   Other:       Neuro: reflexes 2+ bilateral UE   Body Structures Involved:  1. Joints  2. Muscles Body Functions Affected:  1. Neuromusculoskeletal  2. Movement Related Activities and Participation Affected:  1. Mobility  2.  Self Care   Number of elements (examined above) that affect the Plan of Care: 1-2: LOW COMPLEXITY   CLINICAL PRESENTATION:   Presentation: Stable and uncomplicated: LOW COMPLEXITY   CLINICAL DECISION MAKING:   Outcome Measure: Tool Used: Neck Disability Index (NDI)  Score:  Initial: 11/50  Most Recent: 10/50 (Date: 12/20/18 )   Interpretation of Score: The Neck Disability Index is a revised form of the Oswestry Low Back Pain Index and is designed to measure the activities of daily living in person's with neck pain. Each section is scored on a 0-5 scale, 5 representing the greatest disability. The scores of each section are added together for a total score of 50. Score 0 1-10 11-20 21-30 31-40 41-49 50   Modifier CH CI CJ CK CL CM CN     ? Changing and Maintaining Body Position:    [id number] - CURRENT STATUS: CI - 1%-19% impaired, limited or restricted    - GOAL STATUS: CI - 1%-19% impaired, limited or restricted    - D/C STATUS:  ---------------To be determined---------------    Medical Necessity:   · Patient is expected to demonstrate progress in strength and range of motion to increase independence with sleeping, driving, and ADL's. Reason for Services/Other Comments:  · Patient will benefit from skilled PT to address impairments identified through evaluation and return to previous ADL and recreational capacity. Use of outcome tool(s) and clinical judgement create a POC that gives a: Clear prediction of patient's progress: LOW COMPLEXITY            TREATMENT:   (In addition to Assessment/Re-Assessment sessions the following treatments were rendered)  Pre-treatment Symptoms/Complaints:  Pt. Reports missing last PT session due to being out of town. Pt. Notes neck ROM continues to fluctuate. Pain: Initial:   Pain Intensity 1: 6 /10 Post Session:  5/10     Therapeutic Exercise: (15 Minutes):  Exercises per grid below to improve mobility and strength.   Required moderate verbal and manual cues to promote proper body alignment and promote proper body posture. Progressed range as indicated. Date:  1/8/2019   Activity/Exercise Parameters   Cervical rotation 4 minutes   Chin tucks (central and in slight rotation bilaterally) 2 minutes   Cervical Extension SNAGS --   Rows (green band) --   Foam roll (thoracic extension, chest stretch) --   Isometric cervical extensions (green band) --   Bird dog 3 x 20   Cervical rotation SNAGS --   Curl ups with chin tuck on swiss ball 3 x 10   T's on swiss ball with chin tuck --   Chin tuck head lift 4 x 10       Manual Therapy (    Soft Tissue Mobilization Duration  Duration: 30 Minutes): Manual techniques to facilitate improved motion and decreased pain. (Used abbreviations: MET - muscle energy technique; PNF - proprioceptive neuromuscular facilitation; NMR - neuromuscular re-education; a/p - anterior to posterior; p/a - posterior to anterior)   · Soft tissue mobilization: R splenius capitis, sternocleidomastoid, scalenes  · Joint mobilization: C1/2 in prone unilateral p/a on the R side. Grade III-IV; C0/C1 flexion grade III  · Joint mobilization: Grade V C1/2 rotation in supine to the R side. · Manual cervical traction  · Soft tissue mobilization: instrument assisted soft tissue mobilization: R sternocleidomastoid, R splenius capitis, R semispinalis capitis    [facility] Portal  Treatment/Session Assessment:    · Response to Treatment:  Dry needling techniques are utilized today to address pain in the sternocleidomastoid and posterior cervical musculature. Pt. Tolerates dry needling today with mild pain complaints and minimal post treatment soreness. · Compliance with Program/Exercises: Compliant most of the time. · Recommendations/Intent for next treatment session: \"Next visit will focus on manual therapy and ROM exercises. \".   Total Treatment Duration: 45 minutes total time  PT Patient Time In/Time Out  Time In: 1500  Time Out: Piercetr. 15, PT

## 2019-01-15 ENCOUNTER — HOSPITAL ENCOUNTER (OUTPATIENT)
Dept: PHYSICAL THERAPY | Age: 69
Discharge: HOME | End: 2019-01-15
Payer: MEDICARE

## 2019-01-15 PROCEDURE — 97110 THERAPEUTIC EXERCISES: CPT

## 2019-01-15 PROCEDURE — 97140 MANUAL THERAPY 1/> REGIONS: CPT

## 2019-01-15 NOTE — PROGRESS NOTES
Tracey Castaneda  : 1950  [place]  [place]: [place] [facility] M-[facility] at [address]  [phone]   [phone]        OUTPATIENT PHYSICAL THERAPY:Daily Note 1/15/2019   ICD-10: Treatment Diagnosis: Cervicalgia [M54.2]; Headache [R51]  Precautions/Allergies:   Patient has no known allergies. MD Orders: Evaluate and Treat MEDICAL/REFERRING DIAGNOSIS:  Neck pain [M54.2]   DATE OF ONSET: 2018  REFERRING PHYSICIAN: Jose Key MD  RETURN PHYSICIAN APPOINTMENT: TBD     INITIAL ASSESSMENT:  Mr. Zechariah Robertson presents with signs of R sided neck pain. Mobility restrictions are present in the upper cervical spine bilaterally with pain present during right sided cervical rotation. Pt. Demonstrates moderate myofascial pain additionally in the R splenius capitis, R sternocleidomastoid, and R scalene musculature. Pt. Will benefit from skilled PT including manual therapy, range of motion, and mobility exercises to address impairments identified. 18 Progress Report: Pt. Attends 10 physical therapy visits. Pt. Demonstrates improved cervical ROM in all directions. Despite improved cervical ROM, pain remains in the R posterior cervical region and R Sternocleidomastoid with cervical rotations movements bilaterally. Upper cervical joint mobility has improved but patient remains positive with the upper cervical flexion rotation test.  Pt. Will benefit from continued manual therapy and deep neck flexor strengthening to address remaining pain. PROBLEM LIST (Impacting functional limitations):  1. Decreased Strength  2. Increased Pain  3. Decreased Activity Tolerance  4. Decreased Flexibility/Joint Mobility  5. Decreased [place] with Home Exercise Program INTERVENTIONS PLANNED:  1. Home Exercise Program (HEP)  2. Manual Therapy  3. Range of Motion (ROM)  4.  Therapeutic Exercise/Strengthening TREATMENT PLAN:  Effective Dates: 11/9/2018 TO 2/7/2019 (90 days). Frequency/Duration: 2 times a week for 90 Day(s)  GOALS: (Goals have been discussed and agreed upon with patient.)  Discharge Goals: Time Frame: 8 weeks  1. Pt. Will demonstrate 60 degrees of cervical rotation bilaterally to improve driving safety. MET  2. Pt. Will demonstrate <3/10 R sided neck pain with 8 hours of sleep to improve pain. NOT MET  3. Pt. Will score < 12% on the NDI to demonstrate improved pain and function. NOT MET  Rehabilitation Potential For Stated Goals: Good              The information in this section was collected on 11/9/18 (except where otherwise noted). HISTORY:   History of Present Injury/Illness (Reason for Referral):  Pt. Attends PT c/o R sided neck pain with gradual insidious onset about a year ago. The pain is located along the R side of the neck and aggravated with turning the head, sleeping at night, and performing daily activities. Pt. Denies injury or numbness tingling, difficulty speaking, tinnitus, or difficulty swallowing. Pt. Notes only treatment so far was massage that helped for a few hours. Pt. Would like to address pain and return to previous level of function. Past Medical History/Comorbidities:   Mr. Epifanio Delgado  has a past medical history of Bradycardia, HLD (hyperlipidemia), Osteoarthritis, and Personal history of colonic polyps. Mr. Epifanio Delgado  has a past surgical history that includes hx gi (2007); hx colonoscopy (9/10/14); pr abdomen surgery proc unlisted; FISTULOTOMY/MAF  (N/A, 9/10/2014); and COLONOSCOPY WITH POLYPECTOMY (N/A, 9/10/2014). Social History/Living Environment:     Lives with family in 2 story home  Prior Level of Function/Work/Activity:  Functioned independently without limitations.       Ambulatory/Rehab Services H2 Model Falls Risk Assessment    Risk Factors:       (1)  Gender [Male] Ability to Rise from Chair:       (0)  Ability to rise in a single movement    Falls Prevention Plan:       No modifications necessary   Total: (5 or greater = High Risk): 1    ©2010 [facility] [address] [place] [address] Patent #[id number]. Federal Law prohibits the replication, distribution or use without written permission from [facility] [employer]     Current Medications:       Current Outpatient Medications:     rosuvastatin (CRESTOR) 10 mg tablet, TAKE 1 TABLET BY MOUTH DAILY, Disp: 90 Tab, Rfl: 1    multivitamin (ONE A DAY) tablet, Take 1 tablet by mouth daily. , Disp: , Rfl:     ibuprofen (MOTRIN) 200 mg tablet, Take 200 mg by mouth every six (6) hours as needed for Pain. Hold till after surgery   Indications: PAIN, Disp: , Rfl:     cyanocobalamin (VITAMIN B12) 500 mcg tablet, Take 500 mcg by mouth daily. , Disp: , Rfl:    Date Last Reviewed:  1/15/2019   Number of Personal Factors/Comorbidities that affect the Plan of Care: 0: LOW COMPLEXITY   EXAMINATION:   Observation:       Palpation:  Upper Trapezius:-- Levator Scapulae:-- Posterior Cervical: tender on R side   Sternocleidomastoid: Tender on R side Scalenes: Tender on R side Other:       Flexibility: Limited suboccipitals, SCM, scalenes bilaterally      Range of Motion: Cervical in degrees  Flexion: 45                                        Extesnion: 40                       Right                     Left   Rotation 64 pain 65 pain   Side Bend -- --       Strength: Manual Muscle Test: upper quarter screen 5/5 MMT bilaterally. Cervical Flexion Endurance Test: --    Special Testing:  Spurlings Right: Left:   Upper limb Tension Test (ULTT1) -- --   Cervical Distraction -- --   Cervical flexion rotation test (+) (+)   Other:       Neuro: reflexes 2+ bilateral UE   Body Structures Involved:  1. Joints  2. Muscles Body Functions Affected:  1. Neuromusculoskeletal  2. Movement Related Activities and Participation Affected:  1. Mobility  2.  Self Care   Number of elements (examined above) that affect the Plan of Care: 1-2: LOW COMPLEXITY   CLINICAL PRESENTATION:   Presentation: Stable and uncomplicated: LOW COMPLEXITY   CLINICAL DECISION MAKING:   Outcome Measure: Tool Used: Neck Disability Index (NDI)  Score:  Initial: 11/50  Most Recent: 10/50 (Date: 12/20/18 )   Interpretation of Score: The Neck Disability Index is a revised form of the Oswestry Low Back Pain Index and is designed to measure the activities of daily living in person's with neck pain. Each section is scored on a 0-5 scale, 5 representing the greatest disability. The scores of each section are added together for a total score of 50. Score 0 1-10 11-20 21-30 31-40 41-49 50   Modifier CH CI CJ CK CL CM CN     ? Changing and Maintaining Body Position:    [id number] - CURRENT STATUS: CI - 1%-19% impaired, limited or restricted    - GOAL STATUS: CI - 1%-19% impaired, limited or restricted    - D/C STATUS:  ---------------To be determined---------------    Medical Necessity:   · Patient is expected to demonstrate progress in strength and range of motion to increase independence with sleeping, driving, and ADL's. Reason for Services/Other Comments:  · Patient will benefit from skilled PT to address impairments identified through evaluation and return to previous ADL and recreational capacity. Use of outcome tool(s) and clinical judgement create a POC that gives a: Clear prediction of patient's progress: LOW COMPLEXITY            TREATMENT:   (In addition to Assessment/Re-Assessment sessions the following treatments were rendered)  Pre-treatment Symptoms/Complaints:  Pt. Notes dry needling last session provided 1-2 days of improved neck pain. Pt. Notes pain returned with L sided cervical rotation. Pain: Initial:   Pain Intensity 1: 6 /10 Post Session:  5/10     Therapeutic Exercise: (25 Minutes):  Exercises per grid below to improve mobility and strength.   Required moderate verbal and manual cues to promote proper body alignment and promote proper body posture. Progressed range as indicated. Date:  1/15/2019   Activity/Exercise Parameters   Cervical rotation 4 minutes   Chin tucks (central and in slight rotation bilaterally) 2 minutes   Cervical Extension SNAGS --   Rows (green band) --   Foam roll (thoracic extension, chest stretch) --   Isometric cervical extensions (green band) 3 x 10   Bird dog 3 x 20   Cervical rotation SNAGS 3 x 10   Curl ups with chin tuck on swiss ball 3 x 10   T's on swiss ball with chin tuck --   Chin tuck head lift 4 x 10       Manual Therapy (    Soft Tissue Mobilization Duration  Duration: 30 Minutes): Manual techniques to facilitate improved motion and decreased pain. (Used abbreviations: MET - muscle energy technique; PNF - proprioceptive neuromuscular facilitation; NMR - neuromuscular re-education; a/p - anterior to posterior; p/a - posterior to anterior)   · Soft tissue mobilization: R splenius capitis, sternocleidomastoid, scalenes  · Joint mobilization: C1/2 in prone unilateral p/a on the R side. Grade III-IV; C0/C1 flexion grade III  · Joint mobilization: Grade V C1/2 rotation in supine to the R side. · Manual cervical traction  · Soft tissue mobilization: instrument assisted soft tissue mobilization: R sternocleidomastoid, R splenius capitis, R semispinalis capitis, R scalenes    MedBridge Portal  Treatment/Session Assessment:    · Response to Treatment: cervical rotation ROM remains improved but pain is consistent in the R scalenes, SCM, and posterior cervical musculature. Pt. Experiences increased tone on the R side with L sided cervical rotation. Dry needling is tolerated without complaints and minimal post treatment soreness. · Compliance with Program/Exercises: Compliant most of the time. · Recommendations/Intent for next treatment session: \"Next visit will focus on manual therapy and ROM exercises. \".   Total Treatment Duration: 55 minutes total time  PT Patient Time In/Time Out  Time In: 1300  Time Out: 2000 Unruly Ferris, PT

## 2019-01-18 ENCOUNTER — HOSPITAL ENCOUNTER (OUTPATIENT)
Dept: PHYSICAL THERAPY | Age: 69
Discharge: HOME | End: 2019-01-18
Payer: MEDICARE

## 2019-01-18 PROCEDURE — 97140 MANUAL THERAPY 1/> REGIONS: CPT

## 2019-01-18 PROCEDURE — 97110 THERAPEUTIC EXERCISES: CPT

## 2019-01-18 NOTE — PROGRESS NOTES
Jesus Morales  : 1950  [place]  [place]: [place] [address] at [address]  [phone]   [phone]        OUTPATIENT PHYSICAL THERAPY:Daily Note 2019   ICD-10: Treatment Diagnosis: Cervicalgia [M54.2]; Headache [R51]  Precautions/Allergies:   Patient has no known allergies. MD Orders: Evaluate and Treat MEDICAL/REFERRING DIAGNOSIS:  Neck pain [M54.2]   DATE OF ONSET: 2018  REFERRING PHYSICIAN: Donald Duffy MD  RETURN PHYSICIAN APPOINTMENT: TBD     INITIAL ASSESSMENT:  Mr. Zack Gao presents with signs of R sided neck pain. Mobility restrictions are present in the upper cervical spine bilaterally with pain present during right sided cervical rotation. Pt. Demonstrates moderate myofascial pain additionally in the R splenius capitis, R sternocleidomastoid, and R scalene musculature. Pt. Will benefit from skilled PT including manual therapy, range of motion, and mobility exercises to address impairments identified. 18 Progress Report: Pt. Attends 10 physical therapy visits. Pt. Demonstrates improved cervical ROM in all directions. Despite improved cervical ROM, pain remains in the R posterior cervical region and R Sternocleidomastoid with cervical rotations movements bilaterally. Upper cervical joint mobility has improved but patient remains positive with the upper cervical flexion rotation test.  Pt. Will benefit from continued manual therapy and deep neck flexor strengthening to address remaining pain. PROBLEM LIST (Impacting functional limitations):  1. Decreased Strength  2. Increased Pain  3. Decreased Activity Tolerance  4. Decreased Flexibility/Joint Mobility  5. Decreased [place] with Home Exercise Program INTERVENTIONS PLANNED:  1. Home Exercise Program (HEP)  2. Manual Therapy  3. Range of Motion (ROM)  4.  Therapeutic Exercise/Strengthening TREATMENT PLAN:  Effective Dates: 11/9/2018 TO 2/7/2019 (90 days). Frequency/Duration: 2 times a week for 90 Day(s)  GOALS: (Goals have been discussed and agreed upon with patient.)  Discharge Goals: Time Frame: 8 weeks  1. Pt. Will demonstrate 60 degrees of cervical rotation bilaterally to improve driving safety. MET  2. Pt. Will demonstrate <3/10 R sided neck pain with 8 hours of sleep to improve pain. NOT MET  3. Pt. Will score < 12% on the NDI to demonstrate improved pain and function. NOT MET  Rehabilitation Potential For Stated Goals: Good              The information in this section was collected on 11/9/18 (except where otherwise noted). HISTORY:   History of Present Injury/Illness (Reason for Referral):  Pt. Attends PT c/o R sided neck pain with gradual insidious onset about a year ago. The pain is located along the R side of the neck and aggravated with turning the head, sleeping at night, and performing daily activities. Pt. Denies injury or numbness tingling, difficulty speaking, tinnitus, or difficulty swallowing. Pt. Notes only treatment so far was massage that helped for a few hours. Pt. Would like to address pain and return to previous level of function. Past Medical History/Comorbidities:   Mr. Isma Vaughan  has a past medical history of Bradycardia, HLD (hyperlipidemia), Osteoarthritis, and Personal history of colonic polyps (2014). He also has no past medical history of Aneurysm ([address] Coagulation disorder ([address] Difficult intubation, Heart failure ([address] Ill-defined condition, Malignant hyperthermia due to anesthesia, Morbid obesity ([address] Nausea & vomiting, Pseudocholinesterase deficiency, Psychiatric disorder, Unspecified adverse effect of anesthesia, or Unspecified sleep apnea. Mr. Isma Vaughan  has a past surgical history that includes hx gi (2007); hx colonoscopy (9/10/14); and pr abdomen surgery proc unlisted.   Social History/Living Environment:     Lives with family in 2 story home  Prior Level of Function/Work/Activity:  Functioned independently without limitations. Ambulatory/Rehab Services H2 Model Falls Risk Assessment    Risk Factors:       (1)  Gender [Male] Ability to Rise from Chair:       (0)  Ability to rise in a single movement    Falls Prevention Plan:       No modifications necessary   Total: (5 or greater = High Risk): 1    ©2010 [facility] [employer]. All Rights Reserved. [employer] Patent #[id number]. Federal Law prohibits the replication, distribution or use without written permission from [facility] [employer]     Current Medications:       Current Outpatient Medications:     rosuvastatin (CRESTOR) 10 mg tablet, TAKE 1 TABLET BY MOUTH DAILY, Disp: 90 Tab, Rfl: 1    multivitamin (ONE A DAY) tablet, Take 1 tablet by mouth daily. , Disp: , Rfl:     ibuprofen (MOTRIN) 200 mg tablet, Take 200 mg by mouth every six (6) hours as needed for Pain. Hold till after surgery   Indications: PAIN, Disp: , Rfl:     cyanocobalamin (VITAMIN B12) 500 mcg tablet, Take 500 mcg by mouth daily. , Disp: , Rfl:    Date Last Reviewed:  1/18/2019   Number of Personal Factors/Comorbidities that affect the Plan of Care: 0: LOW COMPLEXITY   EXAMINATION:   Observation:       Palpation:  Upper Trapezius:-- Levator Scapulae:-- Posterior Cervical: tender on R side   Sternocleidomastoid: Tender on R side Scalenes: Tender on R side Other:       Flexibility: Limited suboccipitals, SCM, scalenes bilaterally      Range of Motion: Cervical in degrees  Flexion: 45                                        Extesnion: 40                       Right                     Left   Rotation 64 pain 65 pain   Side Bend -- --       Strength: Manual Muscle Test: upper quarter screen 5/5 MMT bilaterally.                         Cervical Flexion Endurance Test: --    Special Testing:  Spurlings Right: Left:   Upper limb Tension Test (ULTT1) -- --   Cervical Distraction -- --   Cervical flexion rotation test (+) (+) Other:       Neuro: reflexes 2+ bilateral UE   Body Structures Involved:  1. Joints  2. Muscles Body Functions Affected:  1. Neuromusculoskeletal  2. Movement Related Activities and Participation Affected:  1. Mobility  2. Self Care   Number of elements (examined above) that affect the Plan of Care: 1-2: LOW COMPLEXITY   CLINICAL PRESENTATION:   Presentation: Stable and uncomplicated: LOW COMPLEXITY   CLINICAL DECISION MAKING:   Outcome Measure: Tool Used: Neck Disability Index (NDI)  Score:  Initial: 11/50  Most Recent: 10/50 (Date: 12/20/18 )   Interpretation of Score: The Neck Disability Index is a revised form of the Oswestry Low Back Pain Index and is designed to measure the activities of daily living in person's with neck pain. Each section is scored on a 0-5 scale, 5 representing the greatest disability. The scores of each section are added together for a total score of 50. Score 0 1-10 11-20 21-30 31-40 41-49 50   Modifier CH CI CJ CK CL CM CN     ? Changing and Maintaining Body Position:    [id number] - CURRENT STATUS: CI - 1%-19% impaired, limited or restricted    - GOAL STATUS: CI - 1%-19% impaired, limited or restricted    - D/C STATUS:  ---------------To be determined---------------    Medical Necessity:   · Patient is expected to demonstrate progress in strength and range of motion to increase independence with sleeping, driving, and ADL's. Reason for Services/Other Comments:  · Patient will benefit from skilled PT to address impairments identified through evaluation and return to previous ADL and recreational capacity. Use of outcome tool(s) and clinical judgement create a POC that gives a: Clear prediction of patient's progress: LOW COMPLEXITY            TREATMENT:   (In addition to Assessment/Re-Assessment sessions the following treatments were rendered)  Pre-treatment Symptoms/Complaints:  Pt. Reports good symptoms relief with dry needling techniques. Pain does gradually return. Pain: Initial:   Pain Intensity 1: 5 /10 Post Session:  5/10     Therapeutic Exercise: (20 Minutes):  Exercises per grid below to improve mobility and strength. Required moderate verbal and manual cues to promote proper body alignment and promote proper body posture. Progressed range as indicated. Date:  1/18/2019   Activity/Exercise Parameters   Cervical rotation 4 minutes   Chin tucks (central and in slight rotation bilaterally) --   Cervical Extension SNAGS --   Rows (green band) --   Foam roll (thoracic extension, chest stretch) --   Isometric cervical extensions (green band) --   Bird dog 3 x 20   Cervical rotation SNAGS --   Curl ups with chin tuck on swiss ball 3 x 10   T's on swiss ball with chin tuck 3 x 15   Chin tuck head lift 4 x 10       Manual Therapy (    Soft Tissue Mobilization Duration  Duration: 40 Minutes): Manual techniques to facilitate improved motion and decreased pain. (Used abbreviations: MET - muscle energy technique; PNF - proprioceptive neuromuscular facilitation; NMR - neuromuscular re-education; a/p - anterior to posterior; p/a - posterior to anterior)   · Soft tissue mobilization: R splenius capitis, sternocleidomastoid, scalenes  · Joint mobilization: C1/2 in prone unilateral p/a on the R side. Grade III-IV; C0/C1 flexion grade III  · Joint mobilization: Grade V C1/2 rotation in supine to the R side. · Manual cervical traction  · Joint mobilization: grade V C7 manipulation in prone  · Soft tissue mobilization: instrument assisted soft tissue mobilization:R splenius capitis, R semispinalis capitis, R scalenes    MedBridge Portal  Treatment/Session Assessment:    · Response to Treatment: Pt. Continues to experience muscular pain in the R scalenes and posterior cervical musculature with rotation to the L side. Pain improves with continued movements. Dry needling is tolerated today without complaints.        · Compliance with Program/Exercises: Compliant most of the time.  · Recommendations/Intent for next treatment session: \"Next visit will focus on manual therapy and ROM exercises. \".   Total Treatment Duration: 60 minutes total time  PT Patient Time In/Time Out  Time In: 1130  Time Out: 4534 Liban Cazares

## 2019-01-22 ENCOUNTER — HOSPITAL ENCOUNTER (OUTPATIENT)
Dept: PHYSICAL THERAPY | Age: 69
Discharge: HOME | End: 2019-01-22
Payer: MEDICARE

## 2019-01-22 PROCEDURE — 97140 MANUAL THERAPY 1/> REGIONS: CPT

## 2019-01-22 PROCEDURE — 97110 THERAPEUTIC EXERCISES: CPT

## 2019-01-22 NOTE — PROGRESS NOTES
Mariza Best  : 1950  [place]  [place] [address] at [address] [place] [address]  [phone]   [phone]        OUTPATIENT PHYSICAL THERAPY:Daily Note 2019   ICD-10: Treatment Diagnosis: Cervicalgia [M54.2]; Headache [R51]  Precautions/Allergies:   Patient has no known allergies. MD Orders: Evaluate and Treat MEDICAL/REFERRING DIAGNOSIS:  Neck pain [M54.2]   DATE OF ONSET: 2018  REFERRING PHYSICIAN: Khoa Smith MD  RETURN PHYSICIAN APPOINTMENT: TBD     INITIAL ASSESSMENT:  Mr. Ad Guzman presents with signs of R sided neck pain. Mobility restrictions are present in the upper cervical spine bilaterally with pain present during right sided cervical rotation. Pt. Demonstrates moderate myofascial pain additionally in the R splenius capitis, R sternocleidomastoid, and R scalene musculature. Pt. Will benefit from skilled PT including manual therapy, range of motion, and mobility exercises to address impairments identified. 18 Progress Report: Pt. Attends 10 physical therapy visits. Pt. Demonstrates improved cervical ROM in all directions. Despite improved cervical ROM, pain remains in the R posterior cervical region and R Sternocleidomastoid with cervical rotations movements bilaterally. Upper cervical joint mobility has improved but patient remains positive with the upper cervical flexion rotation test.  Pt. Will benefit from continued manual therapy and deep neck flexor strengthening to address remaining pain. PROBLEM LIST (Impacting functional limitations):  1. Decreased Strength  2. Increased Pain  3. Decreased Activity Tolerance  4. Decreased Flexibility/Joint Mobility  5. Decreased [place] with Home Exercise Program INTERVENTIONS PLANNED:  1. Home Exercise Program (HEP)  2. Manual Therapy  3. Range of Motion (ROM)  4.  Therapeutic Exercise/Strengthening TREATMENT PLAN:  Effective Dates: 11/9/2018 TO 2/7/2019 (90 days). Frequency/Duration: 2 times a week for 90 Day(s)  GOALS: (Goals have been discussed and agreed upon with patient.)  Discharge Goals: Time Frame: 8 weeks  1. Pt. Will demonstrate 60 degrees of cervical rotation bilaterally to improve driving safety. MET  2. Pt. Will demonstrate <3/10 R sided neck pain with 8 hours of sleep to improve pain. NOT MET  3. Pt. Will score < 12% on the NDI to demonstrate improved pain and function. NOT MET  Rehabilitation Potential For Stated Goals: Good              The information in this section was collected on 11/9/18 (except where otherwise noted). HISTORY:   History of Present Injury/Illness (Reason for Referral):  Pt. Attends PT c/o R sided neck pain with gradual insidious onset about a year ago. The pain is located along the R side of the neck and aggravated with turning the head, sleeping at night, and performing daily activities. Pt. Denies injury or numbness tingling, difficulty speaking, tinnitus, or difficulty swallowing. Pt. Notes only treatment so far was massage that helped for a few hours. Pt. Would like to address pain and return to previous level of function. Past Medical History/Comorbidities:   Mr. Teddy Holman  has a past medical history of Bradycardia, HLD (hyperlipidemia), Osteoarthritis, and Personal history of colonic polyps (2014). He also has no past medical history of Aneurysm ([address] Coagulation disorder ([address] Difficult intubation, Heart failure ([address] Ill-defined condition, Malignant hyperthermia due to anesthesia, Morbid obesity ([address] Nausea & vomiting, Pseudocholinesterase deficiency, Psychiatric disorder, Unspecified adverse effect of anesthesia, or Unspecified sleep apnea. Mr. Teddy Holman  has a past surgical history that includes hx gi (2007); hx colonoscopy (9/10/14); and pr abdomen surgery proc unlisted.   Social History/Living Environment:     Lives with family in 2 story home  Prior Level of Function/Work/Activity:  Functioned independently without limitations. Ambulatory/Rehab Services H2 Model Falls Risk Assessment    Risk Factors:       (1)  Gender [Male] Ability to Rise from Chair:       (0)  Ability to rise in a single movement    Falls Prevention Plan:       No modifications necessary   Total: (5 or greater = High Risk): 1    ©2010 [facility] of [employer]. All Rights Reserved. [employer] Patent #[id number]. Federal Law prohibits the replication, distribution or use without written permission from [facility] [email or web address]     Current Medications:       Current Outpatient Medications:     rosuvastatin (CRESTOR) 10 mg tablet, TAKE 1 TABLET BY MOUTH DAILY, Disp: 90 Tab, Rfl: 1    multivitamin (ONE A DAY) tablet, Take 1 tablet by mouth daily. , Disp: , Rfl:     ibuprofen (MOTRIN) 200 mg tablet, Take 200 mg by mouth every six (6) hours as needed for Pain. Hold till after surgery   Indications: PAIN, Disp: , Rfl:     cyanocobalamin (VITAMIN B12) 500 mcg tablet, Take 500 mcg by mouth daily. , Disp: , Rfl:    Date Last Reviewed:  1/22/2019   Number of Personal Factors/Comorbidities that affect the Plan of Care: 0: LOW COMPLEXITY   EXAMINATION:   Observation:       Palpation:  Upper Trapezius:-- Levator Scapulae:-- Posterior Cervical: tender on R side   Sternocleidomastoid: Tender on R side Scalenes: Tender on R side Other:       Flexibility: Limited suboccipitals, SCM, scalenes bilaterally      Range of Motion: Cervical in degrees  Flexion: 45                                        Extesnion: 40                       Right                     Left   Rotation 64 pain 65 pain   Side Bend -- --       Strength: Manual Muscle Test: upper quarter screen 5/5 MMT bilaterally.                         Cervical Flexion Endurance Test: --    Special Testing:  Spurlings Right: Left:   Upper limb Tension Test (ULTT1) -- --   Cervical Distraction -- --   Cervical flexion rotation test (+) (+) Other:       Neuro: reflexes 2+ bilateral UE   Body Structures Involved:  1. Joints  2. Muscles Body Functions Affected:  1. Neuromusculoskeletal  2. Movement Related Activities and Participation Affected:  1. Mobility  2. Self Care   Number of elements (examined above) that affect the Plan of Care: 1-2: LOW COMPLEXITY   CLINICAL PRESENTATION:   Presentation: Stable and uncomplicated: LOW COMPLEXITY   CLINICAL DECISION MAKING:   Outcome Measure: Tool Used: Neck Disability Index (NDI)  Score:  Initial: 11/50  Most Recent: 10/50 (Date: 12/20/18 )   Interpretation of Score: The Neck Disability Index is a revised form of the Oswestry Low Back Pain Index and is designed to measure the activities of daily living in person's with neck pain. Each section is scored on a 0-5 scale, 5 representing the greatest disability. The scores of each section are added together for a total score of 50. Score 0 1-10 11-20 21-30 31-40 41-49 50   Modifier CH CI CJ CK CL CM CN     ? Changing and Maintaining Body Position:    [id number] - CURRENT STATUS: CI - 1%-19% impaired, limited or restricted    - GOAL STATUS: CI - 1%-19% impaired, limited or restricted    - D/C STATUS:  ---------------To be determined---------------    Medical Necessity:   · Patient is expected to demonstrate progress in strength and range of motion to increase independence with sleeping, driving, and ADL's. Reason for Services/Other Comments:  · Patient will benefit from skilled PT to address impairments identified through evaluation and return to previous ADL and recreational capacity. Use of outcome tool(s) and clinical judgement create a POC that gives a: Clear prediction of patient's progress: LOW COMPLEXITY            TREATMENT:   (In addition to Assessment/Re-Assessment sessions the following treatments were rendered)  Pre-treatment Symptoms/Complaints:  Pt. Reports good symptoms relief with dry needling techniques. Pain does gradually return. Pain: Initial:   Pain Intensity 1: 5 /10 Post Session:  5/10     Therapeutic Exercise: (25 Minutes):  Exercises per grid below to improve mobility and strength. Required moderate verbal and manual cues to promote proper body alignment and promote proper body posture. Progressed range as indicated. Date:  1/22/2019   Activity/Exercise Parameters   Cervical rotation 4 minutes   Chin tucks (central and in slight rotation bilaterally) --   Cervical Extension SNAGS --   Rows (green band) --   Foam roll (thoracic extension, chest stretch) 4 minutes   Isometric cervical extensions (green band) --   Bird dog 3 x 20   Cervical rotation SNAGS --   Curl ups with chin tuck on swiss ball 3 x 10   T's on swiss ball with chin tuck 3 x 15   Chin tuck head lift 4 x 10       Manual Therapy (    Soft Tissue Mobilization Duration  Duration: 30 Minutes): Manual techniques to facilitate improved motion and decreased pain. (Used abbreviations: MET - muscle energy technique; PNF - proprioceptive neuromuscular facilitation; NMR - neuromuscular re-education; a/p - anterior to posterior; p/a - posterior to anterior)   · Soft tissue mobilization: R splenius capitis, sternocleidomastoid, scalenes  · Joint mobilization: C1/2 in prone unilateral p/a on the R side. Grade III-IV; C0/C1 flexion grade III  · Joint mobilization: Grade V C1/2 rotation in supine to the R side. · Manual cervical traction  · Joint mobilization: grade V C7 manipulation in prone (NOT PERFORMED)  · Soft tissue mobilization: instrument assisted soft tissue mobilization:R splenius capitis, R semispinalis capitis, R scalenes (NOT PERFORMED)    [facility] Portal  Treatment/Session Assessment:    · Response to Treatment: Pt. Continues to demonstrate improved cervical range of motion in all directions. Subjective pain complaints remain with cervical rotation. Pt. Tolerates deep cervical flexor/extensor strengthening without complaints.         · Compliance with Program/Exercises: Compliant most of the time. · Recommendations/Intent for next treatment session: \"Next visit will focus on manual therapy and strengthening exercises. \".   Total Treatment Duration: 55 minutes total time  PT Patient Time In/Time Out  Time In: 1530  Time Out: [address]

## 2019-01-24 ENCOUNTER — HOSPITAL ENCOUNTER (OUTPATIENT)
Dept: PHYSICAL THERAPY | Age: 69
Discharge: HOME | End: 2019-01-24
Payer: MEDICARE

## 2019-01-24 PROCEDURE — 97110 THERAPEUTIC EXERCISES: CPT

## 2019-01-24 PROCEDURE — 97140 MANUAL THERAPY 1/> REGIONS: CPT

## 2019-01-24 NOTE — PROGRESS NOTES
Nura Hernandez  : 1950  [place]  [place]: [place] [address] [facility] at [address], [address] [place] [address] Drive  [phone]   [phone]        OUTPATIENT PHYSICAL THERAPY:Daily Note 2019   ICD-10: Treatment Diagnosis: Cervicalgia [M54.2]; Headache [R51]  Precautions/Allergies:   Patient has no known allergies. MD Orders: Evaluate and Treat MEDICAL/REFERRING DIAGNOSIS:  Neck pain [M54.2]   DATE OF ONSET: 2018  REFERRING PHYSICIAN: Conor Curry MD  RETURN PHYSICIAN APPOINTMENT: TBD     INITIAL ASSESSMENT:  Mr. Teddy Holman presents with signs of R sided neck pain. Mobility restrictions are present in the upper cervical spine bilaterally with pain present during right sided cervical rotation. Pt. Demonstrates moderate myofascial pain additionally in the R splenius capitis, R sternocleidomastoid, and R scalene musculature. Pt. Will benefit from skilled PT including manual therapy, range of motion, and mobility exercises to address impairments identified. 18 Progress Report: Pt. Attends 10 physical therapy visits. Pt. Demonstrates improved cervical ROM in all directions. Despite improved cervical ROM, pain remains in the R posterior cervical region and R Sternocleidomastoid with cervical rotations movements bilaterally. Upper cervical joint mobility has improved but patient remains positive with the upper cervical flexion rotation test.  Pt. Will benefit from continued manual therapy and deep neck flexor strengthening to address remaining pain. PROBLEM LIST (Impacting functional limitations):  1. Decreased Strength  2. Increased Pain  3. Decreased Activity Tolerance  4. Decreased Flexibility/Joint Mobility  5. Decreased [place] with Home Exercise Program INTERVENTIONS PLANNED:  1. Home Exercise Program (HEP)  2. Manual Therapy  3. Range of Motion (ROM)  4.  Therapeutic Exercise/Strengthening TREATMENT PLAN:  Effective Dates: 11/9/2018 TO 2/7/2019 (90 days). Frequency/Duration: 2 times a week for 90 Day(s)  GOALS: (Goals have been discussed and agreed upon with patient.)  Discharge Goals: Time Frame: 8 weeks  1. Pt. Will demonstrate 60 degrees of cervical rotation bilaterally to improve driving safety. MET  2. Pt. Will demonstrate <3/10 R sided neck pain with 8 hours of sleep to improve pain. NOT MET  3. Pt. Will score < 12% on the NDI to demonstrate improved pain and function. NOT MET  Rehabilitation Potential For Stated Goals: Good              The information in this section was collected on 11/9/18 (except where otherwise noted). HISTORY:   History of Present Injury/Illness (Reason for Referral):  Pt. Attends PT c/o R sided neck pain with gradual insidious onset about a year ago. The pain is located along the R side of the neck and aggravated with turning the head, sleeping at night, and performing daily activities. Pt. Denies injury or numbness tingling, difficulty speaking, tinnitus, or difficulty swallowing. Pt. Notes only treatment so far was massage that helped for a few hours. Pt. Would like to address pain and return to previous level of function. Past Medical History/Comorbidities:   Mr. Eleuterio Farmer  has a past medical history of Bradycardia, HLD (hyperlipidemia), Osteoarthritis, and Personal history of colonic polyps (2014). He also has no past medical history of Aneurysm ([address] Coagulation disorder ([address] Difficult intubation, Heart failure ([address] Ill-defined condition, Malignant hyperthermia due to anesthesia, Morbid obesity ([address] Nausea & vomiting, Pseudocholinesterase deficiency, Psychiatric disorder, Unspecified adverse effect of anesthesia, or Unspecified sleep apnea. Mr. Eleuterio Farmer  has a past surgical history that includes hx gi (2007); hx colonoscopy (9/10/14); and pr abdomen surgery proc unlisted.   Social History/Living Environment:     Lives with family in 2 story home  Prior Level of Function/Work/Activity:  Functioned independently without limitations. Ambulatory/Rehab Services H2 Model Falls Risk Assessment    Risk Factors:       (1)  Gender [Male] Ability to Rise from Chair:       (0)  Ability to rise in a single movement    Falls Prevention Plan:       No modifications necessary   Total: (5 or greater = High Risk): 1    ©2010 [facility] of [employer]. All Rights Reserved. [employer] Patent #[id number]. Federal Law prohibits the replication, distribution or use without written permission from [facility] [email or web address]     Current Medications:       Current Outpatient Medications:     rosuvastatin (CRESTOR) 10 mg tablet, TAKE 1 TABLET BY MOUTH DAILY, Disp: 90 Tab, Rfl: 1    multivitamin (ONE A DAY) tablet, Take 1 tablet by mouth daily. , Disp: , Rfl:     ibuprofen (MOTRIN) 200 mg tablet, Take 200 mg by mouth every six (6) hours as needed for Pain. Hold till after surgery   Indications: PAIN, Disp: , Rfl:     cyanocobalamin (VITAMIN B12) 500 mcg tablet, Take 500 mcg by mouth daily. , Disp: , Rfl:    Date Last Reviewed:  1/24/2019   Number of Personal Factors/Comorbidities that affect the Plan of Care: 0: LOW COMPLEXITY   EXAMINATION:   Observation:       Palpation:  Upper Trapezius:-- Levator Scapulae:-- Posterior Cervical: tender on R side   Sternocleidomastoid: Tender on R side Scalenes: Tender on R side Other:       Flexibility: Limited suboccipitals, SCM, scalenes bilaterally      Range of Motion: Cervical in degrees  Flexion: 45                                        Extesnion: 40                       Right                     Left   Rotation 64 pain 65 pain   Side Bend -- --       Strength: Manual Muscle Test: upper quarter screen 5/5 MMT bilaterally.                         Cervical Flexion Endurance Test: --    Special Testing:  Spurlings Right: Left:   Upper limb Tension Test (ULTT1) -- --   Cervical Distraction -- --   Cervical flexion rotation test (+) (+) Other:       Neuro: reflexes 2+ bilateral UE   Body Structures Involved:  1. Joints  2. Muscles Body Functions Affected:  1. Neuromusculoskeletal  2. Movement Related Activities and Participation Affected:  1. Mobility  2. Self Care   Number of elements (examined above) that affect the Plan of Care: 1-2: LOW COMPLEXITY   CLINICAL PRESENTATION:   Presentation: Stable and uncomplicated: LOW COMPLEXITY   CLINICAL DECISION MAKING:   Outcome Measure: Tool Used: Neck Disability Index (NDI)  Score:  Initial: 11/50  Most Recent: 10/50 (Date: 12/20/18 )   Interpretation of Score: The Neck Disability Index is a revised form of the Oswestry Low Back Pain Index and is designed to measure the activities of daily living in person's with neck pain. Each section is scored on a 0-5 scale, 5 representing the greatest disability. The scores of each section are added together for a total score of 50. Medical Necessity:   · Patient is expected to demonstrate progress in strength and range of motion to increase independence with sleeping, driving, and ADL's. Reason for Services/Other Comments:  · Patient will benefit from skilled PT to address impairments identified through evaluation and return to previous ADL and recreational capacity. Use of outcome tool(s) and clinical judgement create a POC that gives a: Clear prediction of patient's progress: LOW COMPLEXITY            TREATMENT:   (In addition to Assessment/Re-Assessment sessions the following treatments were rendered)  Pre-treatment Symptoms/Complaints:  Pt. Notes progress is slow with neck pain but it is getting better. Pain: Initial:   Pain Intensity 1: 5 /10 Post Session:  5/10     Therapeutic Exercise: (20 Minutes):  Exercises per grid below to improve mobility and strength. Required moderate verbal and manual cues to promote proper body alignment and promote proper body posture. Progressed range as indicated.      Date:  1/24/2019   Activity/Exercise Parameters   Cervical rotation 4 minutes   Chin tucks (central and in slight rotation bilaterally) --   Upper trapezius stretch 3 minutes   Rows (green band) --   Foam roll (thoracic extension, chest stretch) --   Isometric cervical extensions (green band) --   Bird dog --   Cervical rotation SNAGS --   Curl ups with chin tuck on swiss ball --   T's on swiss ball with chin tuck --   Chin tuck head lift 4 x 10   Cervical Proprioception (laser) 10 minutes   Manual Therapy (    Soft Tissue Mobilization Duration  Duration: 40 Minutes): Manual techniques to facilitate improved motion and decreased pain. (Used abbreviations: MET - muscle energy technique; PNF - proprioceptive neuromuscular facilitation; NMR - neuromuscular re-education; a/p - anterior to posterior; p/a - posterior to anterior)   · Soft tissue mobilization: R splenius capitis, sternocleidomastoid, scalenes  · Joint mobilization: C1/2 in prone unilateral p/a on the R side. Grade III-IV; C0/C1 flexion grade III  · Joint mobilization: Grade V C1/2 rotation in supine to the R side. · Manual cervical traction  · Joint mobilization: grade V C7 manipulation in prone   · Soft tissue mobilization: instrument assisted soft tissue mobilization:R splenius capitis, R semispinalis capitis, R scalenes     [employer] Portal  Treatment/Session Assessment:    · Response to Treatment: Instrument assisted soft tissue mobilization is tolerated today with mild within session pain but minimal post treatment soreness. Pt. Will benefit from continued manual therapy and gradual progression of deep neck flexor and extensor strength. · Compliance with Program/Exercises: Compliant most of the time. · Recommendations/Intent for next treatment session: \"Next visit will focus on manual therapy and strengthening exercises. \".   Total Treatment Duration: 60 minutes total time  PT Patient Time In/Time Out  Time In: 1530  Time Out: [address], PT

## 2019-01-29 ENCOUNTER — HOSPITAL ENCOUNTER (OUTPATIENT)
Dept: PHYSICAL THERAPY | Age: 69
Discharge: HOME | End: 2019-01-29
Payer: MEDICARE

## 2019-01-29 PROCEDURE — 97140 MANUAL THERAPY 1/> REGIONS: CPT

## 2019-01-29 PROCEDURE — 97110 THERAPEUTIC EXERCISES: CPT

## 2019-01-29 NOTE — PROGRESS NOTES
Daniel Quiñonez  : 1950  [place]  [place] [address] at [address] [place] [address]  [phone]   [phone]        OUTPATIENT PHYSICAL THERAPY:Daily Note 2019   ICD-10: Treatment Diagnosis: Cervicalgia [M54.2]; Headache [R51]  Precautions/Allergies:   Patient has no known allergies. MD Orders: Evaluate and Treat MEDICAL/REFERRING DIAGNOSIS:  Neck pain [M54.2]   DATE OF ONSET: 2018  REFERRING PHYSICIAN: Crystal Stein MD  RETURN PHYSICIAN APPOINTMENT: TBD     INITIAL ASSESSMENT:  Mr. Quan Pettit presents with signs of R sided neck pain. Mobility restrictions are present in the upper cervical spine bilaterally with pain present during right sided cervical rotation. Pt. Demonstrates moderate myofascial pain additionally in the R splenius capitis, R sternocleidomastoid, and R scalene musculature. Pt. Will benefit from skilled PT including manual therapy, range of motion, and mobility exercises to address impairments identified. 18 Progress Report: Pt. Attends 10 physical therapy visits. Pt. Demonstrates improved cervical ROM in all directions. Despite improved cervical ROM, pain remains in the R posterior cervical region and R Sternocleidomastoid with cervical rotations movements bilaterally. Upper cervical joint mobility has improved but patient remains positive with the upper cervical flexion rotation test.  Pt. Will benefit from continued manual therapy and deep neck flexor strengthening to address remaining pain. PROBLEM LIST (Impacting functional limitations):  1. Decreased Strength  2. Increased Pain  3. Decreased Activity Tolerance  4. Decreased Flexibility/Joint Mobility  5. Decreased [place] with Home Exercise Program INTERVENTIONS PLANNED:  1. Home Exercise Program (HEP)  2. Manual Therapy  3. Range of Motion (ROM)  4.  Therapeutic Exercise/Strengthening TREATMENT PLAN:  Effective Dates: 11/9/2018 TO 2/7/2019 (90 days). Frequency/Duration: 2 times a week for 90 Day(s)  GOALS: (Goals have been discussed and agreed upon with patient.)  Discharge Goals: Time Frame: 8 weeks  1. Pt. Will demonstrate 60 degrees of cervical rotation bilaterally to improve driving safety. MET  2. Pt. Will demonstrate <3/10 R sided neck pain with 8 hours of sleep to improve pain. NOT MET  3. Pt. Will score < 12% on the NDI to demonstrate improved pain and function. NOT MET  Rehabilitation Potential For Stated Goals: Good              The information in this section was collected on 11/9/18 (except where otherwise noted). HISTORY:   History of Present Injury/Illness (Reason for Referral):  Pt. Attends PT c/o R sided neck pain with gradual insidious onset about a year ago. The pain is located along the R side of the neck and aggravated with turning the head, sleeping at night, and performing daily activities. Pt. Denies injury or numbness tingling, difficulty speaking, tinnitus, or difficulty swallowing. Pt. Notes only treatment so far was massage that helped for a few hours. Pt. Would like to address pain and return to previous level of function. Past Medical History/Comorbidities:   Mr. Estefanía Doyle  has a past medical history of Bradycardia, HLD (hyperlipidemia), Osteoarthritis, and Personal history of colonic polyps (2014). He also has no past medical history of Aneurysm ([address] Coagulation disorder ([address] Difficult intubation, Heart failure ([address] Ill-defined condition, Malignant hyperthermia due to anesthesia, Morbid obesity ([address] Nausea & vomiting, Pseudocholinesterase deficiency, Psychiatric disorder, Unspecified adverse effect of anesthesia, or Unspecified sleep apnea. Mr. Estefanía Doyle  has a past surgical history that includes hx gi (2007); hx colonoscopy (9/10/14); and pr abdomen surgery proc unlisted.   Social History/Living Environment:     Lives with family in 2 story home  Prior Level of Function/Work/Activity:  Functioned independently without limitations. Ambulatory/Rehab Services H2 Model Falls Risk Assessment    Risk Factors:       (1)  Gender [Male] Ability to Rise from Chair:       (0)  Ability to rise in a single movement    Falls Prevention Plan:       No modifications necessary   Total: (5 or greater = High Risk): 1    ©2010 [facility] of [employer]. All Rights Reserved. Myrna [employer] Patent #[id number]. Federal Law prohibits the replication, distribution or use without written permission from [facility] [employer]     Current Medications:       Current Outpatient Medications:     rosuvastatin (CRESTOR) 10 mg tablet, TAKE 1 TABLET BY MOUTH DAILY, Disp: 90 Tab, Rfl: 1    multivitamin (ONE A DAY) tablet, Take 1 tablet by mouth daily. , Disp: , Rfl:     ibuprofen (MOTRIN) 200 mg tablet, Take 200 mg by mouth every six (6) hours as needed for Pain. Hold till after surgery   Indications: PAIN, Disp: , Rfl:     cyanocobalamin (VITAMIN B12) 500 mcg tablet, Take 500 mcg by mouth daily. , Disp: , Rfl:    Date Last Reviewed:  1/29/2019   Number of Personal Factors/Comorbidities that affect the Plan of Care: 0: LOW COMPLEXITY   EXAMINATION:   Observation:       Palpation:  Upper Trapezius:-- Levator Scapulae:-- Posterior Cervical: tender on R side   Sternocleidomastoid: Tender on R side Scalenes: Tender on R side Other:       Flexibility: Limited suboccipitals, SCM, scalenes bilaterally      Range of Motion: Cervical in degrees  Flexion: 45                                        Extesnion: 40                       Right                     Left   Rotation 64 pain 65 pain   Side Bend -- --       Strength: Manual Muscle Test: upper quarter screen 5/5 MMT bilaterally.                         Cervical Flexion Endurance Test: --    Special Testing:  [place] Right: Left:   Upper limb Tension Test (ULTT1) -- --   Cervical Distraction -- --   Cervical flexion rotation test (+) (+) Other:       Neuro: reflexes 2+ bilateral UE   Body Structures Involved:  1. Joints  2. Muscles Body Functions Affected:  1. Neuromusculoskeletal  2. Movement Related Activities and Participation Affected:  1. Mobility  2. Self Care   Number of elements (examined above) that affect the Plan of Care: 1-2: LOW COMPLEXITY   CLINICAL PRESENTATION:   Presentation: Stable and uncomplicated: LOW COMPLEXITY   CLINICAL DECISION MAKING:   Outcome Measure: Tool Used: Neck Disability Index (NDI)  Score:  Initial: 11/50  Most Recent: 10/50 (Date: 12/20/18 )   Interpretation of Score: The Neck Disability Index is a revised form of the Oswestry Low Back Pain Index and is designed to measure the activities of daily living in person's with neck pain. Each section is scored on a 0-5 scale, 5 representing the greatest disability. The scores of each section are added together for a total score of 50. Medical Necessity:   · Patient is expected to demonstrate progress in strength and range of motion to increase independence with sleeping, driving, and ADL's. Reason for Services/Other Comments:  · Patient will benefit from skilled PT to address impairments identified through evaluation and return to previous ADL and recreational capacity. Use of outcome tool(s) and clinical judgement create a POC that gives a: Clear prediction of patient's progress: LOW COMPLEXITY            TREATMENT:   (In addition to Assessment/Re-Assessment sessions the following treatments were rendered)  Pre-treatment Symptoms/Complaints:  Pt. Reports a day of minimal pain following last PT session. Symptoms seem to be improving        Pain: Initial:   Pain Intensity 1: 5 /10 Post Session:  5/10     Therapeutic Exercise: (30 Minutes):  Exercises per grid below to improve mobility and strength. Required moderate verbal and manual cues to promote proper body alignment and promote proper body posture. Progressed range as indicated. Date:  1/29/2019   Activity/Exercise Parameters   Cervical rotation 4 minutes   Chin tucks (central and in slight rotation bilaterally) --   Upper trapezius stretch 3 minutes   Rows (green band) --   Foam roll (thoracic extension, chest stretch) --   Isometric cervical extensions (green band) --   Bird dog 3 x 20   Cervical rotation SNAGS --   Curl ups with chin tuck on swiss ball 3 x 15   T's on swiss ball with chin tuck 3 x 15   Chin tuck head lift 4 x 10   Cervical Proprioception (laser) --   Modified push up with chin tuck 3 x 8   Manual Therapy (    Soft Tissue Mobilization Duration  Duration: 30 Minutes): Manual techniques to facilitate improved motion and decreased pain. (Used abbreviations: MET - muscle energy technique; PNF - proprioceptive neuromuscular facilitation; NMR - neuromuscular re-education; a/p - anterior to posterior; p/a - posterior to anterior)   · Soft tissue mobilization: R splenius capitis, sternocleidomastoid, scalenes  · Joint mobilization: C1/2 in prone unilateral p/a on the R side. Grade III-IV; C0/C1 flexion grade III  · Joint mobilization: Grade V C1/2 rotation in supine to the R side. · Manual cervical traction  · Joint mobilization: grade V C7 manipulation in prone     [employer] Portal  Treatment/Session Assessment:    · Response to Treatment: Cervical rotation to the L is less painful today. Pt. Tolerates cervical strengthening exercises today without complaints but limited endurance. · Compliance with Program/Exercises: Compliant most of the time. · Recommendations/Intent for next treatment session: \"Next visit will focus on manual therapy and strengthening exercises. \".   Total Treatment Duration: 60 minutes total time  PT Patient Time In/Time Out  Time In: 1600  Time Out: 100 [facility] Josy, PT

## 2019-01-31 ENCOUNTER — HOSPITAL ENCOUNTER (OUTPATIENT)
Dept: PHYSICAL THERAPY | Age: 69
Discharge: HOME | End: 2019-01-31
Payer: MEDICARE

## 2019-01-31 PROCEDURE — 97140 MANUAL THERAPY 1/> REGIONS: CPT

## 2019-01-31 PROCEDURE — 97110 THERAPEUTIC EXERCISES: CPT

## 2019-01-31 NOTE — PROGRESS NOTES
Ezra Malone  : 1950  [place]  Secondary: [facility] at [address]  [phone]   [phone]        OUTPATIENT PHYSICAL THERAPY:Daily Note 2019   ICD-10: Treatment Diagnosis: Cervicalgia [M54.2]; Headache [R51]  Precautions/Allergies:   Patient has no known allergies. MD Orders: Evaluate and Treat MEDICAL/REFERRING DIAGNOSIS:  Neck pain [M54.2]   DATE OF ONSET: 2018  REFERRING PHYSICIAN: Ilan Koo MD  RETURN PHYSICIAN APPOINTMENT: TBD     INITIAL ASSESSMENT:  Mr. Larissa Reina presents with signs of R sided neck pain. Mobility restrictions are present in the upper cervical spine bilaterally with pain present during right sided cervical rotation. Pt. Demonstrates moderate myofascial pain additionally in the R splenius capitis, R sternocleidomastoid, and R scalene musculature. Pt. Will benefit from skilled PT including manual therapy, range of motion, and mobility exercises to address impairments identified. 18 Progress Report: Pt. Attends 10 physical therapy visits. Pt. Demonstrates improved cervical ROM in all directions. Despite improved cervical ROM, pain remains in the R posterior cervical region and R Sternocleidomastoid with cervical rotations movements bilaterally. Upper cervical joint mobility has improved but patient remains positive with the upper cervical flexion rotation test.  Pt. Will benefit from continued manual therapy and deep neck flexor strengthening to address remaining pain. PROBLEM LIST (Impacting functional limitations):  1. Decreased Strength  2. Increased Pain  3. Decreased Activity Tolerance  4. Decreased Flexibility/Joint Mobility  5. Decreased [place] with Home Exercise Program INTERVENTIONS PLANNED:  1. Home Exercise Program (HEP)  2. Manual Therapy  3. Range of Motion (ROM)  4.  Therapeutic Exercise/Strengthening TREATMENT PLAN:  Effective Dates: 11/9/2018 TO 2/7/2019 (90 days). Frequency/Duration: 2 times a week for 90 Day(s)  GOALS: (Goals have been discussed and agreed upon with patient.)  Discharge Goals: Time Frame: 8 weeks  1. Pt. Will demonstrate 60 degrees of cervical rotation bilaterally to improve driving safety. MET  2. Pt. Will demonstrate <3/10 R sided neck pain with 8 hours of sleep to improve pain. NOT MET  3. Pt. Will score < 12% on the NDI to demonstrate improved pain and function. NOT MET  Rehabilitation Potential For Stated Goals: Good              The information in this section was collected on 11/9/18 (except where otherwise noted). HISTORY:   History of Present Injury/Illness (Reason for Referral):  Pt. Attends PT c/o R sided neck pain with gradual insidious onset about a year ago. The pain is located along the R side of the neck and aggravated with turning the head, sleeping at night, and performing daily activities. Pt. Denies injury or numbness tingling, difficulty speaking, tinnitus, or difficulty swallowing. Pt. Notes only treatment so far was massage that helped for a few hours. Pt. Would like to address pain and return to previous level of function. Past Medical History/Comorbidities:   Mr. Kasey Villa  has a past medical history of Bradycardia, HLD (hyperlipidemia), Osteoarthritis, and Personal history of colonic polyps (2014). He also has no past medical history of Aneurysm ([address] Coagulation disorder ([address] Difficult intubation, Heart failure ([address] Ill-defined condition, Malignant hyperthermia due to anesthesia, Morbid obesity ([address] Nausea & vomiting, Pseudocholinesterase deficiency, Psychiatric disorder, Unspecified adverse effect of anesthesia, or Unspecified sleep apnea. Mr. Kasey Villa  has a past surgical history that includes hx gi (2007); hx colonoscopy (9/10/14); and pr abdomen surgery proc unlisted.   Social History/Living Environment:     Lives with family in 2 story home  Prior Level of Function/Work/Activity:  Functioned independently without limitations. Ambulatory/Rehab Services H2 Model Falls Risk Assessment    Risk Factors:       (1)  Gender [Male] Ability to Rise from Chair:       (0)  Ability to rise in a single movement    Falls Prevention Plan:       No modifications necessary   Total: (5 or greater = High Risk): 1    ©2010 [facility] of [employer]. All Rights Reserved. [employer] Patent #[id number]. Federal Law prohibits the replication, distribution or use without written permission from [facility] [employer]     Current Medications:       Current Outpatient Medications:     rosuvastatin (CRESTOR) 10 mg tablet, TAKE 1 TABLET BY MOUTH DAILY, Disp: 90 Tab, Rfl: 1    multivitamin (ONE A DAY) tablet, Take 1 tablet by mouth daily. , Disp: , Rfl:     ibuprofen (MOTRIN) 200 mg tablet, Take 200 mg by mouth every six (6) hours as needed for Pain. Hold till after surgery   Indications: PAIN, Disp: , Rfl:     cyanocobalamin (VITAMIN B12) 500 mcg tablet, Take 500 mcg by mouth daily. , Disp: , Rfl:    Date Last Reviewed:  1/31/2019   Number of Personal Factors/Comorbidities that affect the Plan of Care: 0: LOW COMPLEXITY   EXAMINATION:   Observation:       Palpation:  Upper Trapezius:-- Levator Scapulae:-- Posterior Cervical: tender on R side   Sternocleidomastoid: Tender on R side Scalenes: Tender on R side Other:       Flexibility: Limited suboccipitals, SCM, scalenes bilaterally      Range of Motion: Cervical in degrees  Flexion: 45                                        Extesnion: 40                       Right                     Left   Rotation 64 pain 65 pain   Side Bend -- --       Strength: Manual Muscle Test: upper quarter screen 5/5 MMT bilaterally.                         Cervical Flexion Endurance Test: --    Special Testing:  [place] Right: Left:   Upper limb Tension Test (ULTT1) -- --   Cervical Distraction -- --   Cervical flexion rotation test (+) (+) Other:       Neuro: reflexes 2+ bilateral UE   Body Structures Involved:  1. Joints  2. Muscles Body Functions Affected:  1. Neuromusculoskeletal  2. Movement Related Activities and Participation Affected:  1. Mobility  2. Self Care   Number of elements (examined above) that affect the Plan of Care: 1-2: LOW COMPLEXITY   CLINICAL PRESENTATION:   Presentation: Stable and uncomplicated: LOW COMPLEXITY   CLINICAL DECISION MAKING:   Outcome Measure: Tool Used: Neck Disability Index (NDI)  Score:  Initial: 11/50  Most Recent: 10/50 (Date: 12/20/18 )   Interpretation of Score: The Neck Disability Index is a revised form of the Oswestry Low Back Pain Index and is designed to measure the activities of daily living in person's with neck pain. Each section is scored on a 0-5 scale, 5 representing the greatest disability. The scores of each section are added together for a total score of 50. Medical Necessity:   · Patient is expected to demonstrate progress in strength and range of motion to increase independence with sleeping, driving, and ADL's. Reason for Services/Other Comments:  · Patient will benefit from skilled PT to address impairments identified through evaluation and return to previous ADL and recreational capacity. Use of outcome tool(s) and clinical judgement create a POC that gives a: Clear prediction of patient's progress: LOW COMPLEXITY            TREATMENT:   (In addition to Assessment/Re-Assessment sessions the following treatments were rendered)  Pre-treatment Symptoms/Complaints:  Pt. Denies new complaints this week. Pain: Initial:   Pain Intensity 1: 5 /10 Post Session:  5/10     Therapeutic Exercise: (20 Minutes):  Exercises per grid below to improve mobility and strength. Required moderate verbal and manual cues to promote proper body alignment and promote proper body posture. Progressed range as indicated.      Date:  1/31/2019   Activity/Exercise Parameters   Cervical rotation 4 minutes   Chin tucks (central and in slight rotation bilaterally) --   Upper trapezius stretch 3 minutes   Rows (green band) --   Foam roll (thoracic extension, chest stretch) --   Isometric cervical extensions (green band) --   Bird dog 3 x 20   Cervical rotation SNAGS --   Curl ups with chin tuck on swiss ball --   T's on swiss ball with chin tuck 3 x 15   Chin tuck head lift 4 x 10   Cervical Proprioception (laser) --   Modified push up with chin tuck --   Manual Therapy (    Soft Tissue Mobilization Duration  Duration: 40 Minutes): Manual techniques to facilitate improved motion and decreased pain. (Used abbreviations: MET - muscle energy technique; PNF - proprioceptive neuromuscular facilitation; NMR - neuromuscular re-education; a/p - anterior to posterior; p/a - posterior to anterior)   · Soft tissue mobilization: R splenius capitis, sternocleidomastoid, scalenes  · Joint mobilization: C1/2 in prone unilateral p/a on the R side. Grade III-IV; C0/C1 flexion grade III  · Joint mobilization: Grade V C1/2 rotation in supine to the R side. · Manual cervical traction  · Joint mobilization: grade V C7 manipulation in prone (NOT PERFORMED)  · Soft tissue mobilization: instrument assisted soft tissue mobilization: R SCM, R middle scalene, R posterior cervical musculature. [facility] Portal  Treatment/Session Assessment:    · Response to Treatment: Pt. Tolerates dry needling without complaints today and minimal post treatment soreness. Cervical rotation continues to improve. · Compliance with Program/Exercises: Compliant most of the time. · Recommendations/Intent for next treatment session: \"Next visit will focus on manual therapy and strengthening exercises. \".   Total Treatment Duration: 60 minutes total time  PT Patient Time In/Time Out  Time In: 1600  Time Out: 100 [facility] Josy, PT

## 2019-02-05 ENCOUNTER — HOSPITAL ENCOUNTER (OUTPATIENT)
Dept: PHYSICAL THERAPY | Age: 69
Discharge: HOME | End: 2019-02-05
Payer: MEDICARE

## 2019-02-05 PROCEDURE — 97110 THERAPEUTIC EXERCISES: CPT

## 2019-02-05 PROCEDURE — 97140 MANUAL THERAPY 1/> REGIONS: CPT

## 2019-02-05 NOTE — PROGRESS NOTES
Mili Muller  : 1950  [place]  [place]: [facility] [place]-[address] at [address]  [phone]   [phone]        OUTPATIENT PHYSICAL THERAPY:Daily Note 2019   ICD-10: Treatment Diagnosis: Cervicalgia [M54.2]; Headache [R51]  Precautions/Allergies:   Patient has no known allergies. MD Orders: Evaluate and Treat MEDICAL/REFERRING DIAGNOSIS:  Neck pain [M54.2]   DATE OF ONSET: 2018  REFERRING PHYSICIAN: Lacie Henriquez MD  RETURN PHYSICIAN APPOINTMENT: TBD     INITIAL ASSESSMENT:  Mr. Alexis Rajan presents with signs of R sided neck pain. Mobility restrictions are present in the upper cervical spine bilaterally with pain present during right sided cervical rotation. Pt. Demonstrates moderate myofascial pain additionally in the R splenius capitis, R sternocleidomastoid, and R scalene musculature. Pt. Will benefit from skilled PT including manual therapy, range of motion, and mobility exercises to address impairments identified. 18 Progress Report: Pt. Attends 10 physical therapy visits. Pt. Demonstrates improved cervical ROM in all directions. Despite improved cervical ROM, pain remains in the R posterior cervical region and R Sternocleidomastoid with cervical rotations movements bilaterally. Upper cervical joint mobility has improved but patient remains positive with the upper cervical flexion rotation test.  Pt. Will benefit from continued manual therapy and deep neck flexor strengthening to address remaining pain. PROBLEM LIST (Impacting functional limitations):  1. Decreased Strength  2. Increased Pain  3. Decreased Activity Tolerance  4. Decreased Flexibility/Joint Mobility  5. Decreased [place] with Home Exercise Program INTERVENTIONS PLANNED:  1. Home Exercise Program (HEP)  2. Manual Therapy  3. Range of Motion (ROM)  4.  Therapeutic Exercise/Strengthening TREATMENT PLAN:  Effective Dates: 11/9/2018 TO 2/7/2019 (90 days). Frequency/Duration: 2 times a week for 90 Day(s)  GOALS: (Goals have been discussed and agreed upon with patient.)  Discharge Goals: Time Frame: 8 weeks  1. Pt. Will demonstrate 60 degrees of cervical rotation bilaterally to improve driving safety. MET  2. Pt. Will demonstrate <3/10 R sided neck pain with 8 hours of sleep to improve pain. NOT MET  3. Pt. Will score < 12% on the NDI to demonstrate improved pain and function. NOT MET  Rehabilitation Potential For Stated Goals: Good              The information in this section was collected on 11/9/18 (except where otherwise noted). HISTORY:   History of Present Injury/Illness (Reason for Referral):  Pt. Attends PT c/o R sided neck pain with gradual insidious onset about a year ago. The pain is located along the R side of the neck and aggravated with turning the head, sleeping at night, and performing daily activities. Pt. Denies injury or numbness tingling, difficulty speaking, tinnitus, or difficulty swallowing. Pt. Notes only treatment so far was massage that helped for a few hours. Pt. Would like to address pain and return to previous level of function. Past Medical History/Comorbidities:   Mr. Alissa Lazcano  has a past medical history of Bradycardia, HLD (hyperlipidemia), Osteoarthritis, and Personal history of colonic polyps (2014). He also has no past medical history of Aneurysm ([address] Coagulation disorder ([address] Difficult intubation, Heart failure ([address] Ill-defined condition, Malignant hyperthermia due to anesthesia, Morbid obesity ([address] Nausea & vomiting, Pseudocholinesterase deficiency, Psychiatric disorder, Unspecified adverse effect of anesthesia, or Unspecified sleep apnea. Mr. Alissa Lazcano  has a past surgical history that includes hx gi (2007); hx colonoscopy (9/10/14); and pr abdomen surgery proc unlisted.   Social History/Living Environment:     Lives with family in 2 story home  Prior Level of Function/Work/Activity:  Functioned independently without limitations. Ambulatory/Rehab Services H2 Model Falls Risk Assessment    Risk Factors:       (1)  Gender [Male] Ability to Rise from Chair:       (0)  Ability to rise in a single movement    Falls Prevention Plan:       No modifications necessary   Total: (5 or greater = High Risk): 1    ©2010 [facility] [place]. All Rights Reserved. [employer] Patent #[id number]. Federal Law prohibits the replication, distribution or use without written permission from [facility] [place]     Current Medications:       Current Outpatient Medications:     rosuvastatin (CRESTOR) 10 mg tablet, TAKE 1 TABLET BY MOUTH DAILY, Disp: 90 Tab, Rfl: 1    multivitamin (ONE A DAY) tablet, Take 1 tablet by mouth daily. , Disp: , Rfl:     ibuprofen (MOTRIN) 200 mg tablet, Take 200 mg by mouth every six (6) hours as needed for Pain. Hold till after surgery   Indications: PAIN, Disp: , Rfl:     cyanocobalamin (VITAMIN B12) 500 mcg tablet, Take 500 mcg by mouth daily. , Disp: , Rfl:    Date Last Reviewed:  2/5/2019   Number of Personal Factors/Comorbidities that affect the Plan of Care: 0: LOW COMPLEXITY   EXAMINATION:   Observation:       Palpation:  Upper Trapezius:-- Levator Scapulae:-- Posterior Cervical: tender on R side   Sternocleidomastoid: Tender on R side Scalenes: Tender on R side Other:       Flexibility: Limited suboccipitals, SCM, scalenes bilaterally      Range of Motion: Cervical in degrees  Flexion: 45                                        Extesnion: 40                       Right                     Left   Rotation 64 pain 65 pain   Side Bend -- --       Strength: Manual Muscle Test: upper quarter screen 5/5 MMT bilaterally.                         Cervical Flexion Endurance Test: --    Special Testing:  Spurlings Right: Left:   Upper limb Tension Test (ULTT1) -- --   Cervical Distraction -- --   Cervical flexion rotation test (+) (+) Other:       Neuro: reflexes 2+ bilateral UE   Body Structures Involved:  1. Joints  2. Muscles Body Functions Affected:  1. Neuromusculoskeletal  2. Movement Related Activities and Participation Affected:  1. Mobility  2. Self Care   Number of elements (examined above) that affect the Plan of Care: 1-2: LOW COMPLEXITY   CLINICAL PRESENTATION:   Presentation: Stable and uncomplicated: LOW COMPLEXITY   CLINICAL DECISION MAKING:   Outcome Measure: Tool Used: Neck Disability Index (NDI)  Score:  Initial: 11/50  Most Recent: 10/50 (Date: 12/20/18 )   Interpretation of Score: The Neck Disability Index is a revised form of the Oswestry Low Back Pain Index and is designed to measure the activities of daily living in person's with neck pain. Each section is scored on a 0-5 scale, 5 representing the greatest disability. The scores of each section are added together for a total score of 50. Medical Necessity:   · Patient is expected to demonstrate progress in strength and range of motion to increase independence with sleeping, driving, and ADL's. Reason for Services/Other Comments:  · Patient will benefit from skilled PT to address impairments identified through evaluation and return to previous ADL and recreational capacity. Use of outcome tool(s) and clinical judgement create a POC that gives a: Clear prediction of patient's progress: LOW COMPLEXITY            TREATMENT:   (In addition to Assessment/Re-Assessment sessions the following treatments were rendered)  Pre-treatment Symptoms/Complaints:  Pt. Reports two days of increased pain over the weekend. Pain returned to baseline today. Pain: Initial:   Pain Intensity 1: 5 /10 Post Session:  5/10     Therapeutic Exercise: (30 Minutes):  Exercises per grid below to improve mobility and strength. Required moderate verbal and manual cues to promote proper body alignment and promote proper body posture. Progressed range as indicated.      Date:  2/5/2019 Activity/Exercise Parameters   Cervical rotation 4 minutes   Chin tucks (central and in slight rotation bilaterally) --   Upper trapezius stretch 3 minutes   Rows (green band) --   Foam roll (thoracic extension, chest stretch) 5 minutes   Isometric cervical extensions (green band) --   Bird dog 3 x 20   Cervical rotation SNAGS --   Curl ups with chin tuck on swiss ball 3 x 20   T's on swiss ball with chin tuck --   Chin tuck head lift 4 x 10   Cervical Proprioception (laser) --   Modified push up with chin tuck 3 x 10   Manual Therapy (    Soft Tissue Mobilization Duration  Duration: 30 Minutes): Manual techniques to facilitate improved motion and decreased pain. (Used abbreviations: MET - muscle energy technique; PNF - proprioceptive neuromuscular facilitation; NMR - neuromuscular re-education; a/p - anterior to posterior; p/a - posterior to anterior)   · Soft tissue mobilization: R splenius capitis, sternocleidomastoid, scalenes  · Joint mobilization: C1/2 in prone unilateral p/a on the R side. Grade III-IV; C0/C1 flexion grade III  · Joint mobilization: Grade V C1/2 rotation in supine to the R side. (NOT PERFORMED)  · Manual cervical traction  · Joint mobilization: grade V C7 manipulation in prone   · Soft tissue mobilization: instrument assisted soft tissue mobilization: R SCM, R middle scalene, R posterior cervical musculature. (NOT PERFORMED)    [facility] Portal  Treatment/Session Assessment:    · Response to Treatment: Pt. Demonstrates increased tenderness to palpation of the R sternocleidomastoid muscle today. Cervical ROM remains unchanged today and flare up over the weekend was likely due to lifting activities helping renovate a building. · Compliance with Program/Exercises: Compliant most of the time. · Recommendations/Intent for next treatment session: \"Next visit will focus on manual therapy and strengthening exercises. \".   Total Treatment Duration: 60 minutes total time  PT Patient Time In/Time Out  Time In: 1330  Time Out: [address] [facility]

## 2019-02-07 ENCOUNTER — HOSPITAL ENCOUNTER (OUTPATIENT)
Dept: PHYSICAL THERAPY | Age: 69
Discharge: HOME | End: 2019-02-07
Payer: MEDICARE

## 2019-02-07 PROCEDURE — 97140 MANUAL THERAPY 1/> REGIONS: CPT

## 2019-02-07 PROCEDURE — 97110 THERAPEUTIC EXERCISES: CPT

## 2019-02-07 NOTE — THERAPY RECERTIFICATION
Quinton Juan  : 1950  [place]  [place] [address] at [place] [address], [address] [facility]  [phone]   [phone]        OUTPATIENT PHYSICAL THERAPY:Daily Note, Progress Report and Recertification [id number]   ICD-10: Treatment Diagnosis: Cervicalgia [M54.2]; Headache [R51]  Precautions/Allergies:   Patient has no known allergies. MD Orders: Evaluate and Treat MEDICAL/REFERRING DIAGNOSIS:  Neck pain [M54.2]   DATE OF ONSET: 2018  REFERRING PHYSICIAN: Veronica Esparza MD  RETURN PHYSICIAN APPOINTMENT: TBD     INITIAL ASSESSMENT:  Mr. Adelina Frederick presents with signs of R sided neck pain. Mobility restrictions are present in the upper cervical spine bilaterally with pain present during right sided cervical rotation. Pt. Demonstrates moderate myofascial pain additionally in the R splenius capitis, R sternocleidomastoid, and R scalene musculature. Pt. Will benefit from skilled PT including manual therapy, range of motion, and mobility exercises to address impairments identified. 18 Progress Report: Pt. Attends 10 physical therapy visits. Pt. Demonstrates improved cervical ROM in all directions. Despite improved cervical ROM, pain remains in the R posterior cervical region and R Sternocleidomastoid with cervical rotations movements bilaterally. Upper cervical joint mobility has improved but patient remains positive with the upper cervical flexion rotation test.  Pt. Will benefit from continued manual therapy and deep neck flexor strengthening to address remaining pain. 19 Progress Report: Improvements in cervical rotation and extension remain, but patient continues to experience pain during cervical rotation. PT suspects mild cervical dystonia symptoms resulting in antagonistic muscle contraction with cervical rotation.   Joint limitations remain in the upper cervical spine resulting in compensated lateral flexion during R sided cervical rotation. Pt. Manueldaron Cain to benefit from physical therapy but may benefit from further alternative treatment additionally. PROBLEM LIST (Impacting functional limitations):  1. Decreased Strength  2. Increased Pain  3. Decreased Activity Tolerance  4. Decreased Flexibility/Joint Mobility  5. Decreased [place] with Home Exercise Program INTERVENTIONS PLANNED:  1. Home Exercise Program (HEP)  2. Manual Therapy  3. Range of Motion (ROM)  4. Therapeutic Exercise/Strengthening   TREATMENT PLAN:  Effective Dates: 02/07/2019 TO 04/09/2019  (60 days). Frequency/Duration: 2 times a week for 60 Day(s)  GOALS: (Goals have been discussed and agreed upon with patient.)  Discharge Goals: Time Frame: 8 weeks  1. Pt. Will demonstrate 60 degrees of cervical rotation bilaterally to improve driving safety. MET  2. Pt. Will demonstrate <3/10 R sided neck pain with 8 hours of sleep to improve pain. NOT MET  3. Pt. Will score < 12% on the NDI to demonstrate improved pain and function. NOT MET  Rehabilitation Potential For Stated Goals: Good              The information in this section was collected on 11/9/18 (except where otherwise noted). HISTORY:   History of Present Injury/Illness (Reason for Referral):  Pt. Attends PT c/o R sided neck pain with gradual insidious onset about a year ago. The pain is located along the R side of the neck and aggravated with turning the head, sleeping at night, and performing daily activities. Pt. Denies injury or numbness tingling, difficulty speaking, tinnitus, or difficulty swallowing. Pt. Notes only treatment so far was massage that helped for a few hours. Pt. Would like to address pain and return to previous level of function. Past Medical History/Comorbidities:   Mr. Adelina Frederick  has a past medical history of Bradycardia, HLD (hyperlipidemia), Osteoarthritis, and Personal history of colonic polyps (2014).  He also has no past medical history of Aneurysm ([facility] Coagulation disorder ([facility] Difficult intubation, Heart failure ([facility] Ill-defined condition, Malignant hyperthermia due to anesthesia, Morbid obesity ([facility] [address] Nausea & vomiting, Pseudocholinesterase deficiency, Psychiatric disorder, Unspecified adverse effect of anesthesia, or Unspecified sleep apnea. Mr. Lynette Plata  has a past surgical history that includes hx gi (2007); hx colonoscopy (9/10/14); and pr abdomen surgery proc unlisted. Social History/Living Environment:     Lives with family in [address] [place] home  Prior Level of Function/Work/Activity:  Functioned independently without limitations. Ambulatory/Rehab Services H2 Model Falls Risk Assessment    Risk Factors:       (1)  Gender [Male] Ability to Rise from Chair:       (0)  Ability to rise in a single movement    Falls Prevention Plan:       No modifications necessary   Total: (5 or greater = High Risk): 1    ©2010 [facility] [employer]. All Rights Reserved. [facility] Patent #[id number]. Federal Law prohibits the replication, distribution or use without written permission from [facility] [employer]     Current Medications:       Current Outpatient Medications:     rosuvastatin (CRESTOR) 10 mg tablet, TAKE 1 TABLET BY MOUTH DAILY, Disp: 90 Tab, Rfl: 1    multivitamin (ONE A DAY) tablet, Take 1 tablet by mouth daily. , Disp: , Rfl:     ibuprofen (MOTRIN) 200 mg tablet, Take 200 mg by mouth every six (6) hours as needed for Pain. Hold till after surgery   Indications: PAIN, Disp: , Rfl:     cyanocobalamin (VITAMIN B12) 500 mcg tablet, Take 500 mcg by mouth daily. , Disp: , Rfl:    Date Last Reviewed:  2/7/2019   Number of Personal Factors/Comorbidities that affect the Plan of Care: 0: LOW COMPLEXITY   EXAMINATION:   Observation:       Palpation:  Upper Trapezius:-- Levator Scapulae:-- Posterior Cervical: tender on R side   Sternocleidomastoid: Tender on R side Scalenes: Tender on R side Other:       Flexibility: Limited suboccipitals, SCM, scalenes bilaterally      Range of Motion: Cervical in degrees  Flexion: 52                                       Extesnion: 42                       Right                     Left   Rotation 62 pain 68    Side Bend 20 16       Strength: Manual Muscle Test: upper quarter screen 5/5 MMT bilaterally. Cervical Flexion Endurance Test: --    Special Testing:  Spurlings Right: Left:   Upper limb Tension Test (ULTT1) -- --   Cervical Distraction -- --   Cervical flexion rotation test (+) (+)   Other:       Neuro: reflexes 2+ bilateral UE   Body Structures Involved:  1. Joints  2. Muscles Body Functions Affected:  1. Neuromusculoskeletal  2. Movement Related Activities and Participation Affected:  1. Mobility  2. Self Care   Number of elements (examined above) that affect the Plan of Care: 1-2: LOW COMPLEXITY   CLINICAL PRESENTATION:   Presentation: Stable and uncomplicated: LOW COMPLEXITY   CLINICAL DECISION MAKING:   Outcome Measure: Tool Used: Neck Disability Index (NDI)  Score:  Initial: 11/50  Most Recent: 14/50 (Date: 02/07/19 )   Interpretation of Score: The Neck Disability Index is a revised form of the Oswestry Low Back Pain Index and is designed to measure the activities of daily living in person's with neck pain. Each section is scored on a 0-5 scale, 5 representing the greatest disability. The scores of each section are added together for a total score of 50. Medical Necessity:   · Patient is expected to demonstrate progress in strength and range of motion to increase independence with sleeping, driving, and ADL's. Reason for Services/Other Comments:  · Patient will benefit from skilled PT to address impairments identified through evaluation and return to previous ADL and recreational capacity.     Use of outcome tool(s) and clinical judgement create a POC that gives a: Clear prediction of patient's progress: LOW COMPLEXITY            TREATMENT:   (In addition to Assessment/Re-Assessment sessions the following treatments were rendered)  Pre-treatment Symptoms/Complaints:  Pt. Reports improved pain levels this week. Pt. Is to be out of town next week and will not attend PT. Pain: Initial:   Pain Intensity 1: 5 /10 Post Session:  5/10     Therapeutic Exercise: (30 Minutes):  Exercises per grid below to improve mobility and strength. Required moderate verbal and manual cues to promote proper body alignment and promote proper body posture. Progressed range as indicated. Date:  2/7/2019   Activity/Exercise Parameters   Cervical rotation 4 minutes   Chin tucks (central and in slight rotation bilaterally) --   Upper trapezius stretch 3 minutes   Rows (green band) --   Foam roll (thoracic extension, chest stretch) --   Isometric cervical extensions (green band) 3 minutes   Bird dog 3 x 20   Cervical rotation SNAGS --   Curl ups with chin tuck on swiss ball --   T's on swiss ball with chin tuck --   Chin tuck head lift 4 x 10   Cervical Proprioception (laser) 10 minutes   Modified push up with chin tuck --   Manual Therapy (    Soft Tissue Mobilization Duration  Duration: 30 Minutes): Manual techniques to facilitate improved motion and decreased pain. (Used abbreviations: MET - muscle energy technique; PNF - proprioceptive neuromuscular facilitation; NMR - neuromuscular re-education; a/p - anterior to posterior; p/a - posterior to anterior)   · Soft tissue mobilization: R splenius capitis, sternocleidomastoid, scalenes  · Joint mobilization: C1/2 in prone unilateral p/a on the R side. Grade III-IV; C0/C1 flexion grade III  · Joint mobilization: Grade V C1/2 rotation in supine to the R side. (NOT PERFORMED)  · Manual cervical traction  · Joint mobilization: grade V C7 manipulation in prone (NOT PERFORMED)  · Soft tissue mobilization: instrument assisted soft tissue mobilization: R posterior cervical musculature.       [facility] Portal  Treatment/Session Assessment: · Response to Treatment: Pt. Continues to demonstrate improved cervical rotation ROM. Pt. Does compensate during right sided cervical rotation utilizing lateral flexion movement versus rotation to achieve movement. · Compliance with Program/Exercises: Compliant most of the time. · Recommendations/Intent for next treatment session: \"Next visit will focus on manual therapy and strengthening exercises. \".   Total Treatment Duration: 60 minutes total time  PT Patient Time In/Time Out  Time In: 1330  Time Out: [address] [facility]

## 2019-02-19 ENCOUNTER — HOSPITAL ENCOUNTER (OUTPATIENT)
Dept: PHYSICAL THERAPY | Age: 69
Discharge: HOME | End: 2019-02-19
Payer: MEDICARE

## 2019-02-19 PROCEDURE — 97140 MANUAL THERAPY 1/> REGIONS: CPT

## 2019-02-19 PROCEDURE — 97110 THERAPEUTIC EXERCISES: CPT

## 2019-02-19 NOTE — PROGRESS NOTES
Salbador Crowder  : 1950  [place]  [place]: [place] [address] [facility] [facility] [address] [place] [address]  [phone]   [phone]        OUTPATIENT PHYSICAL THERAPY:Daily Note 2019   ICD-10: Treatment Diagnosis: Cervicalgia [M54.2]; Headache [R51]  Precautions/Allergies:   Patient has no known allergies. MD Orders: Evaluate and Treat MEDICAL/REFERRING DIAGNOSIS:  Neck pain [M54.2]   DATE OF ONSET: 2018  REFERRING PHYSICIAN: Arin Rock MD  RETURN PHYSICIAN APPOINTMENT: TBD     INITIAL ASSESSMENT:  Mr. Alissa Lazcano presents with signs of R sided neck pain. Mobility restrictions are present in the upper cervical spine bilaterally with pain present during right sided cervical rotation. Pt. Demonstrates moderate myofascial pain additionally in the R splenius capitis, R sternocleidomastoid, and R scalene musculature. Pt. Will benefit from skilled PT including manual therapy, range of motion, and mobility exercises to address impairments identified. 18 Progress Report: Pt. Attends 10 physical therapy visits. Pt. Demonstrates improved cervical ROM in all directions. Despite improved cervical ROM, pain remains in the R posterior cervical region and R Sternocleidomastoid with cervical rotations movements bilaterally. Upper cervical joint mobility has improved but patient remains positive with the upper cervical flexion rotation test.  Pt. Will benefit from continued manual therapy and deep neck flexor strengthening to address remaining pain. 19 Progress Report: Improvements in cervical rotation and extension remain, but patient continues to experience pain during cervical rotation. PT suspects mild cervical dystonia symptoms resulting in antagonistic muscle contraction with cervical rotation.   Joint limitations remain in the upper cervical spine resulting in compensated lateral flexion during R sided cervical rotation. Pt. Clarita Galeas to benefit from physical therapy but may benefit from further alternative treatment additionally. PROBLEM LIST (Impacting functional limitations):  1. Decreased Strength  2. Increased Pain  3. Decreased Activity Tolerance  4. Decreased Flexibility/Joint Mobility  5. Decreased [place] with Home Exercise Program INTERVENTIONS PLANNED:  1. Home Exercise Program (HEP)  2. Manual Therapy  3. Range of Motion (ROM)  4. Therapeutic Exercise/Strengthening   TREATMENT PLAN:  Effective Dates: 02/07/2019 TO 04/09/2019  (60 days). Frequency/Duration: 2 times a week for 60 Day(s)  GOALS: (Goals have been discussed and agreed upon with patient.)  Discharge Goals: Time Frame: 8 weeks  1. Pt. Will demonstrate 60 degrees of cervical rotation bilaterally to improve driving safety. MET  2. Pt. Will demonstrate <3/10 R sided neck pain with 8 hours of sleep to improve pain. NOT MET  3. Pt. Will score < 12% on the NDI to demonstrate improved pain and function. NOT MET  Rehabilitation Potential For Stated Goals: Good              The information in this section was collected on 11/9/18 (except where otherwise noted). HISTORY:   History of Present Injury/Illness (Reason for Referral):  Pt. Attends PT c/o R sided neck pain with gradual insidious onset about a year ago. The pain is located along the R side of the neck and aggravated with turning the head, sleeping at night, and performing daily activities. Pt. Denies injury or numbness tingling, difficulty speaking, tinnitus, or difficulty swallowing. Pt. Notes only treatment so far was massage that helped for a few hours. Pt. Would like to address pain and return to previous level of function. Past Medical History/Comorbidities:   Mr. Yaima Robb  has a past medical history of Bradycardia, HLD (hyperlipidemia), Osteoarthritis, and Personal history of colonic polyps (2014).  He also has no past medical history of Aneurysm ([address] Coagulation disorder ([address] Difficult intubation, Heart failure ([address] Ill-defined condition, Malignant hyperthermia due to anesthesia, Morbid obesity ([address] Nausea & vomiting, Pseudocholinesterase deficiency, Psychiatric disorder, Unspecified adverse effect of anesthesia, or Unspecified sleep apnea. Mr. Quan Pettit  has a past surgical history that includes hx gi (2007); hx colonoscopy (9/10/14); and pr abdomen surgery proc unlisted. Social History/Living Environment:     Lives with family in 2 story home  Prior Level of Function/Work/Activity:  Functioned independently without limitations. Ambulatory/Rehab Services H2 Model Falls Risk Assessment    Risk Factors:       (1)  Gender [Male] Ability to Rise from Chair:       (0)  Ability to rise in a single movement    Falls Prevention Plan:       No modifications necessary   Total: (5 or greater = High Risk): 1    ©2010 [facility] of [employer]. All Rights Reserved. [facility] Patent #[id number]. Federal Law prohibits the replication, distribution or use without written permission from [facility] [employer]     Current Medications:       Current Outpatient Medications:     rosuvastatin (CRESTOR) 10 mg tablet, TAKE 1 TABLET BY MOUTH DAILY, Disp: 90 Tab, Rfl: 1    multivitamin (ONE A DAY) tablet, Take 1 tablet by mouth daily. , Disp: , Rfl:     ibuprofen (MOTRIN) 200 mg tablet, Take 200 mg by mouth every six (6) hours as needed for Pain. Hold till after surgery   Indications: PAIN, Disp: , Rfl:     cyanocobalamin (VITAMIN B12) 500 mcg tablet, Take 500 mcg by mouth daily. , Disp: , Rfl:    Date Last Reviewed:  2/19/2019   Number of Personal Factors/Comorbidities that affect the Plan of Care: 0: LOW COMPLEXITY   EXAMINATION:   Observation:       Palpation:  Upper Trapezius:-- Levator Scapulae:-- Posterior Cervical: tender on R side   Sternocleidomastoid: Tender on R side Scalenes: Tender on R side Other:       Flexibility: Limited suboccipitals, SCM, scalenes bilaterally      Range of Motion: Cervical in degrees  Flexion: 52                                       Extesnion: 42                       Right                     Left   Rotation 62 pain 68    Side Bend 20 16       Strength: Manual Muscle Test: upper quarter screen 5/5 MMT bilaterally. Cervical Flexion Endurance Test: --    Special Testing:  Spurlings Right: Left:   Upper limb Tension Test (ULTT1) -- --   Cervical Distraction -- --   Cervical flexion rotation test (+) (+)   Other:       Neuro: reflexes 2+ bilateral UE   Body Structures Involved:  1. Joints  2. Muscles Body Functions Affected:  1. Neuromusculoskeletal  2. Movement Related Activities and Participation Affected:  1. Mobility  2. Self Care   Number of elements (examined above) that affect the Plan of Care: 1-2: LOW COMPLEXITY   CLINICAL PRESENTATION:   Presentation: Stable and uncomplicated: LOW COMPLEXITY   CLINICAL DECISION MAKING:   Outcome Measure: Tool Used: Neck Disability Index (NDI)  Score:  Initial: 11/50  Most Recent: 14/50 (Date: 02/07/19 )   Interpretation of Score: The Neck Disability Index is a revised form of the Oswestry Low Back Pain Index and is designed to measure the activities of daily living in person's with neck pain. Each section is scored on a 0-5 scale, 5 representing the greatest disability. The scores of each section are added together for a total score of 50. Medical Necessity:   · Patient is expected to demonstrate progress in strength and range of motion to increase independence with sleeping, driving, and ADL's. Reason for Services/Other Comments:  · Patient will benefit from skilled PT to address impairments identified through evaluation and return to previous ADL and recreational capacity.     Use of outcome tool(s) and clinical judgement create a POC that gives a: Clear prediction of patient's progress: LOW COMPLEXITY            TREATMENT:   (In addition to Assessment/Re-Assessment sessions the following treatments were rendered)  Pre-treatment Symptoms/Complaints:  Pt. Notes he traveled to [place] last week. Pt. States the neck has been stiff all week. Pain: Initial:   Pain Intensity 1: 6 /10 Post Session:  5/10     Therapeutic Exercise: (30 Minutes):  Exercises per grid below to improve mobility and strength. Required moderate verbal and manual cues to promote proper body alignment and promote proper body posture. Progressed range as indicated. Date:  2/19/2019   Activity/Exercise Parameters   Cervical rotation 4 minutes   Chin tucks (central and in slight rotation bilaterally) --   Upper trapezius stretch 3 minutes   Rows (green band) --   Foam roll (thoracic extension, chest stretch) 4 minutes   Isometric cervical extensions (green band) 3 minutes   Bird dog 3 x 20   Cervical rotation SNAGS --   Curl ups with chin tuck on swiss ball --   T's on swiss ball with chin tuck --   Chin tuck head lift 4 x 10   Cervical Proprioception (laser) --   Modified push up with chin tuck --   Thoracic rotation with cervical rotation in sidelying 3 x 10   PNF D2 in half kneeling 3 x 10       Manual Therapy (    Soft Tissue Mobilization Duration  Duration: 30 Minutes): Manual techniques to facilitate improved motion and decreased pain. (Used abbreviations: MET - muscle energy technique; PNF - proprioceptive neuromuscular facilitation; NMR - neuromuscular re-education; a/p - anterior to posterior; p/a - posterior to anterior)   · Soft tissue mobilization: R splenius capitis, sternocleidomastoid, scalenes  · Joint mobilization: C1/2 in prone unilateral p/a on the R side. Grade III-IV; C0/C1 flexion grade III  · Joint mobilization: Grade V C1/2 rotation in supine to the R side. · Manual cervical traction  · Joint mobilization: grade V C7 manipulation in prone    [facility]  Treatment/Session Assessment:    · Response to Treatment: Pt.  Demonstrates increased tenderness to palpation of the R SCM and posterior cervical musculature today. Rotation in B directions is restricted and painful at the beginning of the session but normalize with therapeutic exercises and manual therapy. Pt. Was likely stiff secondary to travel and different sleeping arrangements over past week. · Compliance with Program/Exercises: Compliant most of the time. · Recommendations/Intent for next treatment session: \"Next visit will focus on manual therapy and strengthening exercises. \".   Total Treatment Duration: 60 minutes total time  PT Patient Time In/Time Out  Time In: 1330  Time Out: [facility]

## 2019-02-21 ENCOUNTER — HOSPITAL ENCOUNTER (OUTPATIENT)
Dept: PHYSICAL THERAPY | Age: 69
Discharge: HOME | End: 2019-02-21
Payer: MEDICARE

## 2019-02-21 PROCEDURE — 97140 MANUAL THERAPY 1/> REGIONS: CPT

## 2019-02-21 PROCEDURE — 97110 THERAPEUTIC EXERCISES: CPT

## 2019-02-21 NOTE — PROGRESS NOTES
Mariza Best  : 1950  [place]  [place] [address] [place] [address]  [phone]   [phone]        OUTPATIENT PHYSICAL THERAPY:Daily Note 2019   ICD-10: Treatment Diagnosis: Cervicalgia [M54.2]; Headache [R51]  Precautions/Allergies:   Patient has no known allergies. MD Orders: Evaluate and Treat MEDICAL/REFERRING DIAGNOSIS:  Neck pain [M54.2]   DATE OF ONSET: 2018  REFERRING PHYSICIAN: Khoa Smith MD  RETURN PHYSICIAN APPOINTMENT: TBD     INITIAL ASSESSMENT:  Mr. Ad Guzman presents with signs of R sided neck pain. Mobility restrictions are present in the upper cervical spine bilaterally with pain present during right sided cervical rotation. Pt. Demonstrates moderate myofascial pain additionally in the R splenius capitis, R sternocleidomastoid, and R scalene musculature. Pt. Will benefit from skilled PT including manual therapy, range of motion, and mobility exercises to address impairments identified. 18 Progress Report: Pt. Attends 10 physical therapy visits. Pt. Demonstrates improved cervical ROM in all directions. Despite improved cervical ROM, pain remains in the R posterior cervical region and R Sternocleidomastoid with cervical rotations movements bilaterally. Upper cervical joint mobility has improved but patient remains positive with the upper cervical flexion rotation test.  Pt. Will benefit from continued manual therapy and deep neck flexor strengthening to address remaining pain. 19 Progress Report: Improvements in cervical rotation and extension remain, but patient continues to experience pain during cervical rotation. PT suspects mild cervical dystonia symptoms resulting in antagonistic muscle contraction with cervical rotation.   Joint limitations remain in the upper cervical spine resulting in compensated lateral flexion during R sided cervical rotation. Pt. Miguelangel Simon to benefit from physical therapy but may benefit from further alternative treatment additionally. PROBLEM LIST (Impacting functional limitations):  1. Decreased Strength  2. Increased Pain  3. Decreased Activity Tolerance  4. Decreased Flexibility/Joint Mobility  5. Decreased [place] with Home Exercise Program INTERVENTIONS PLANNED:  1. Home Exercise Program (HEP)  2. Manual Therapy  3. Range of Motion (ROM)  4. Therapeutic Exercise/Strengthening   TREATMENT PLAN:  Effective Dates: 02/07/2019 TO 04/09/2019  (60 days). Frequency/Duration: 2 times a week for 60 Day(s)  GOALS: (Goals have been discussed and agreed upon with patient.)  Discharge Goals: Time Frame: 8 weeks  1. Pt. Will demonstrate 60 degrees of cervical rotation bilaterally to improve driving safety. MET  2. Pt. Will demonstrate <3/10 R sided neck pain with 8 hours of sleep to improve pain. NOT MET  3. Pt. Will score < 12% on the NDI to demonstrate improved pain and function. NOT MET  Rehabilitation Potential For Stated Goals: Good              The information in this section was collected on 11/9/18 (except where otherwise noted). HISTORY:   History of Present Injury/Illness (Reason for Referral):  Pt. Attends PT c/o R sided neck pain with gradual insidious onset about a year ago. The pain is located along the R side of the neck and aggravated with turning the head, sleeping at night, and performing daily activities. Pt. Denies injury or numbness tingling, difficulty speaking, tinnitus, or difficulty swallowing. Pt. Notes only treatment so far was massage that helped for a few hours. Pt. Would like to address pain and return to previous level of function. Past Medical History/Comorbidities:   Mr. Isma Vaughan  has a past medical history of Bradycardia, HLD (hyperlipidemia), Osteoarthritis, and Personal history of colonic polyps (2014).  He also has no past medical history of Aneurysm ([address] Coagulation disorder ([address] Difficult intubation, Heart failure ([address] Ill-defined condition, Malignant hyperthermia due to anesthesia, Morbid obesity ([address] Nausea & vomiting, Pseudocholinesterase deficiency, Psychiatric disorder, Unspecified adverse effect of anesthesia, or Unspecified sleep apnea. Mr. Martha Hubbard  has a past surgical history that includes hx gi (2007); hx colonoscopy (9/10/14); and pr abdomen surgery proc unlisted. Social History/Living Environment:     Lives with family in 2 story home  Prior Level of Function/Work/Activity:  Functioned independently without limitations. Ambulatory/Rehab Services H2 Model Falls Risk Assessment    Risk Factors:       (1)  Gender [Male] Ability to Rise from Chair:       (0)  Ability to rise in a single movement    Falls Prevention Plan:       No modifications necessary   Total: (5 or greater = High Risk): 1    ©2010 [facility] of [employer]. All Rights Reserved. [facility] Patent #[id number]. Federal Law prohibits the replication, distribution or use without written permission from [facility] [id number]     Current Medications:       Current Outpatient Medications:     rosuvastatin (CRESTOR) 10 mg tablet, TAKE 1 TABLET BY MOUTH DAILY, Disp: 90 Tab, Rfl: 1    multivitamin (ONE A DAY) tablet, Take 1 tablet by mouth daily. , Disp: , Rfl:     ibuprofen (MOTRIN) 200 mg tablet, Take 200 mg by mouth every six (6) hours as needed for Pain. Hold till after surgery   Indications: PAIN, Disp: , Rfl:     cyanocobalamin (VITAMIN B12) 500 mcg tablet, Take 500 mcg by mouth daily. , Disp: , Rfl:    Date Last Reviewed:  2/21/2019   Number of Personal Factors/Comorbidities that affect the Plan of Care: 0: LOW COMPLEXITY   EXAMINATION:   Observation:       Palpation:  Upper Trapezius:-- Levator Scapulae:-- Posterior Cervical: tender on R side   Sternocleidomastoid: Tender on R side Scalenes: Tender on R side Other:       Flexibility: Limited suboccipitals, SCM, scalenes bilaterally      Range of Motion: Cervical in degrees  Flexion: 52                                       Extesnion: 42                       Right                     Left   Rotation 62 pain 68    Side Bend 20 16       Strength: Manual Muscle Test: upper quarter screen 5/5 MMT bilaterally. Cervical Flexion Endurance Test: --    Special Testing:  Spurlings Right: Left:   Upper limb Tension Test (ULTT1) -- --   Cervical Distraction -- --   Cervical flexion rotation test (+) (+)   Other:       Neuro: reflexes 2+ bilateral UE   Body Structures Involved:  1. Joints  2. Muscles Body Functions Affected:  1. Neuromusculoskeletal  2. Movement Related Activities and Participation Affected:  1. Mobility  2. Self Care   Number of elements (examined above) that affect the Plan of Care: 1-2: LOW COMPLEXITY   CLINICAL PRESENTATION:   Presentation: Stable and uncomplicated: LOW COMPLEXITY   CLINICAL DECISION MAKING:   Outcome Measure: Tool Used: Neck Disability Index (NDI)  Score:  Initial: 11/50  Most Recent: 14/50 (Date: 02/07/19 )   Interpretation of Score: The Neck Disability Index is a revised form of the Oswestry Low Back Pain Index and is designed to measure the activities of daily living in person's with neck pain. Each section is scored on a 0-5 scale, 5 representing the greatest disability. The scores of each section are added together for a total score of 50. Medical Necessity:   · Patient is expected to demonstrate progress in strength and range of motion to increase independence with sleeping, driving, and ADL's. Reason for Services/Other Comments:  · Patient will benefit from skilled PT to address impairments identified through evaluation and return to previous ADL and recreational capacity.     Use of outcome tool(s) and clinical judgement create a POC that gives a: Clear prediction of patient's progress: LOW COMPLEXITY            TREATMENT:   (In addition to Assessment/Re-Assessment sessions the following treatments were rendered)  Pre-treatment Symptoms/Complaints:  Pt. Reports improved motion and pain following last PT session but stiffness in the neck returns today. Pt. States he feels good progress since starting PT but the progress is slow. Pain: Initial:   Pain Intensity 1: 5 /10 Post Session:  5/10     Therapeutic Exercise: (20 Minutes):  Exercises per grid below to improve mobility and strength. Required moderate verbal and manual cues to promote proper body alignment and promote proper body posture. Progressed range as indicated. Date:  2/21/2019   Activity/Exercise Parameters   Cervical rotation 4 minutes   Chin tucks (central and in slight rotation bilaterally) --   Upper trapezius stretch 3 minutes   Rows (green band) --   Foam roll (thoracic extension, chest stretch) 4 minutes   Isometric cervical extensions (green band) --   Bird dog --   Cervical rotation SNAGS --   Curl ups with chin tuck on swiss ball --   T's on swiss ball with chin tuck --   Chin tuck head lift 4 x 10   Cervical Proprioception (laser) --   Modified push up with chin tuck --   Thoracic rotation with cervical rotation in sidelying 3 x 10   PNF D2 in half kneeling 3 x 10       Manual Therapy (    Soft Tissue Mobilization Duration  Duration: 40 Minutes): Manual techniques to facilitate improved motion and decreased pain. (Used abbreviations: MET - muscle energy technique; PNF - proprioceptive neuromuscular facilitation; NMR - neuromuscular re-education; a/p - anterior to posterior; p/a - posterior to anterior)   · Soft tissue mobilization: R splenius capitis, sternocleidomastoid, scalenes  · Joint mobilization: C1/2 in prone unilateral p/a on the R side. Grade III-IV; C0/C1 flexion grade III  · Joint mobilization: Grade V C1/2 rotation in supine to the R side.    · Manual cervical traction  · Joint mobilization: grade V C7 manipulation in prone  · Instrument assisted soft tissue mobilization: R upper trapezius, SCM, splenius capitis, posterior cervical musculature on R side. [employer] Portal  Treatment/Session Assessment:    · Response to Treatment: Dry needling techniques are tolerated with mild pain complaints and minimal post treatment soreness. Pt. Continues to demonstrate pain with bilateral cervical rotation. · Compliance with Program/Exercises: Compliant most of the time. · Recommendations/Intent for next treatment session: \"Next visit will focus on manual therapy and strengthening exercises. \".   Total Treatment Duration: 60 minutes total time  PT Patient Time In/Time Out  Time In: 1330  Time Out: [address]

## 2019-02-25 ENCOUNTER — HOSPITAL ENCOUNTER (OUTPATIENT)
Dept: PHYSICAL THERAPY | Age: 69
Discharge: HOME | End: 2019-02-25
Payer: MEDICARE

## 2019-02-25 PROCEDURE — 97140 MANUAL THERAPY 1/> REGIONS: CPT

## 2019-02-25 PROCEDURE — 97110 THERAPEUTIC EXERCISES: CPT

## 2019-02-25 NOTE — PROGRESS NOTES
Mariza Best  : 1950  [place]  [place]: [place] [address] at [address] [place] [address]  [phone]   [phone]        OUTPATIENT PHYSICAL THERAPY:Daily Note 2019   ICD-10: Treatment Diagnosis: Cervicalgia [M54.2]; Headache [R51]  Precautions/Allergies:   Patient has no known allergies. MD Orders: Evaluate and Treat MEDICAL/REFERRING DIAGNOSIS:  Neck pain [M54.2]   DATE OF ONSET: 2018  REFERRING PHYSICIAN: Khoa Smith MD  RETURN PHYSICIAN APPOINTMENT: TBD     INITIAL ASSESSMENT:  Mr. Ad Guzman presents with signs of R sided neck pain. Mobility restrictions are present in the upper cervical spine bilaterally with pain present during right sided cervical rotation. Pt. Demonstrates moderate myofascial pain additionally in the R splenius capitis, R sternocleidomastoid, and R scalene musculature. Pt. Will benefit from skilled PT including manual therapy, range of motion, and mobility exercises to address impairments identified. 18 Progress Report: Pt. Attends 10 physical therapy visits. Pt. Demonstrates improved cervical ROM in all directions. Despite improved cervical ROM, pain remains in the R posterior cervical region and R Sternocleidomastoid with cervical rotations movements bilaterally. Upper cervical joint mobility has improved but patient remains positive with the upper cervical flexion rotation test.  Pt. Will benefit from continued manual therapy and deep neck flexor strengthening to address remaining pain. 19 Progress Report: Improvements in cervical rotation and extension remain, but patient continues to experience pain during cervical rotation. PT suspects mild cervical dystonia symptoms resulting in antagonistic muscle contraction with cervical rotation.   Joint limitations remain in the upper cervical spine resulting in compensated lateral flexion during R sided cervical rotation. Pt. Chyna Cardozapauline to benefit from physical therapy but may benefit from further alternative treatment additionally. PROBLEM LIST (Impacting functional limitations):  1. Decreased Strength  2. Increased Pain  3. Decreased Activity Tolerance  4. Decreased Flexibility/Joint Mobility  5. Decreased [place] with Home Exercise Program INTERVENTIONS PLANNED:  1. Home Exercise Program (HEP)  2. Manual Therapy  3. Range of Motion (ROM)  4. Therapeutic Exercise/Strengthening   TREATMENT PLAN:  Effective Dates: 02/07/2019 TO 04/09/2019  (60 days). Frequency/Duration: 2 times a week for 60 Day(s)  GOALS: (Goals have been discussed and agreed upon with patient.)  Discharge Goals: Time Frame: 8 weeks  1. Pt. Will demonstrate 60 degrees of cervical rotation bilaterally to improve driving safety. MET  2. Pt. Will demonstrate <3/10 R sided neck pain with 8 hours of sleep to improve pain. NOT MET  3. Pt. Will score < 12% on the NDI to demonstrate improved pain and function. NOT MET  Rehabilitation Potential For Stated Goals: Good              The information in this section was collected on 11/9/18 (except where otherwise noted). HISTORY:   History of Present Injury/Illness (Reason for Referral):  Pt. Attends PT c/o R sided neck pain with gradual insidious onset about a year ago. The pain is located along the R side of the neck and aggravated with turning the head, sleeping at night, and performing daily activities. Pt. Denies injury or numbness tingling, difficulty speaking, tinnitus, or difficulty swallowing. Pt. Notes only treatment so far was massage that helped for a few hours. Pt. Would like to address pain and return to previous level of function. Past Medical History/Comorbidities:   Mr. Zechariah Robertson  has a past medical history of Bradycardia, HLD (hyperlipidemia), Osteoarthritis, and Personal history of colonic polyps (2014).  He also has no past medical history of Aneurysm ([address] Coagulation disorder ([address] Difficult intubation, Heart failure ([address] Ill-defined condition, Malignant hyperthermia due to anesthesia, Morbid obesity ([address] Nausea & vomiting, Pseudocholinesterase deficiency, Psychiatric disorder, Unspecified adverse effect of anesthesia, or Unspecified sleep apnea. Mr. Mirna Gaines  has a past surgical history that includes hx gi (2007); hx colonoscopy (9/10/14); and pr abdomen surgery proc unlisted. Social History/Living Environment:     Lives with family in 2 story home  Prior Level of Function/Work/Activity:  Functioned independently without limitations. Ambulatory/Rehab Services H2 Model Falls Risk Assessment    Risk Factors:       (1)  Gender [Male] Ability to Rise from Chair:       (0)  Ability to rise in a single movement    Falls Prevention Plan:       No modifications necessary   Total: (5 or greater = High Risk): 1    ©2010 [facility] of [email or web address]. All Rights Reserved. [facility] Patent #[id number]. Federal Law prohibits the replication, distribution or use without written permission from [facility] [employer]     Current Medications:       Current Outpatient Medications:     rosuvastatin (CRESTOR) 10 mg tablet, TAKE 1 TABLET BY MOUTH DAILY, Disp: 90 Tab, Rfl: 1    multivitamin (ONE A DAY) tablet, Take 1 tablet by mouth daily. , Disp: , Rfl:     ibuprofen (MOTRIN) 200 mg tablet, Take 200 mg by mouth every six (6) hours as needed for Pain. Hold till after surgery   Indications: PAIN, Disp: , Rfl:     cyanocobalamin (VITAMIN B12) 500 mcg tablet, Take 500 mcg by mouth daily. , Disp: , Rfl:    Date Last Reviewed:  2/25/2019   Number of Personal Factors/Comorbidities that affect the Plan of Care: 0: LOW COMPLEXITY   EXAMINATION:   Observation:       Palpation:  Upper Trapezius:-- Levator Scapulae:-- Posterior Cervical: tender on R side   Sternocleidomastoid: Tender on R side Scalenes: Tender on R side Other:       Flexibility: Limited suboccipitals, SCM, scalenes bilaterally      Range of Motion: Cervical in degrees  Flexion: 52                                       Extesnion: 42                       Right                     Left   Rotation 62 pain 68    Side Bend 20 16       Strength: Manual Muscle Test: upper quarter screen 5/5 MMT bilaterally. Cervical Flexion Endurance Test: --    Special Testing:  Spurlings Right: Left:   Upper limb Tension Test (ULTT1) -- --   Cervical Distraction -- --   Cervical flexion rotation test (+) (+)   Other:       Neuro: reflexes 2+ bilateral UE   Body Structures Involved:  1. Joints  2. Muscles Body Functions Affected:  1. Neuromusculoskeletal  2. Movement Related Activities and Participation Affected:  1. Mobility  2. Self Care   Number of elements (examined above) that affect the Plan of Care: 1-2: LOW COMPLEXITY   CLINICAL PRESENTATION:   Presentation: Stable and uncomplicated: LOW COMPLEXITY   CLINICAL DECISION MAKING:   Outcome Measure: Tool Used: Neck Disability Index (NDI)  Score:  Initial: 11/50  Most Recent: 14/50 (Date: 02/07/19 )   Interpretation of Score: The Neck Disability Index is a revised form of the Oswestry Low Back Pain Index and is designed to measure the activities of daily living in person's with neck pain. Each section is scored on a 0-5 scale, 5 representing the greatest disability. The scores of each section are added together for a total score of 50. Medical Necessity:   · Patient is expected to demonstrate progress in strength and range of motion to increase independence with sleeping, driving, and ADL's. Reason for Services/Other Comments:  · Patient will benefit from skilled PT to address impairments identified through evaluation and return to previous ADL and recreational capacity.     Use of outcome tool(s) and clinical judgement create a POC that gives a: Clear prediction of patient's progress: LOW COMPLEXITY            TREATMENT:   (In addition to Assessment/Re-Assessment sessions the following treatments were rendered)  Pre-treatment Symptoms/Complaints:  Pt. Notes symptoms are about the same from previous PT session. Pt. Notes he has been taking alleve more regularly for pain relief. Pain: Initial:   Pain Intensity 1: 5 /10 Post Session:  5/10     Therapeutic Exercise: (30 Minutes):  Exercises per grid below to improve mobility and strength. Required moderate verbal and manual cues to promote proper body alignment and promote proper body posture. Progressed range as indicated. Date:  2/25/2019   Activity/Exercise Parameters   Cervical rotation 4 minutes   Chin tucks (central and in slight rotation bilaterally) --   Upper trapezius stretch/scalene 3 minutes   Rows (green band) --   Foam roll (thoracic extension, chest stretch) 4 minutes   Isometric cervical extensions (green band) --   Bird dog --   Cervical rotation SNAGS --   Curl ups with chin tuck on swiss ball 3 x 15   T's on swiss ball with chin tuck 3 x 15   Chin tuck head lift 4 x 10   Cervical Proprioception (laser) --   Modified push up with chin tuck --   Thoracic rotation with cervical rotation in sidelying 3 x 10   PNF D2 in half kneeling 3 x 10       Manual Therapy (    Soft Tissue Mobilization Duration  Duration: 30 Minutes): Manual techniques to facilitate improved motion and decreased pain. (Used abbreviations: MET - muscle energy technique; PNF - proprioceptive neuromuscular facilitation; NMR - neuromuscular re-education; a/p - anterior to posterior; p/a - posterior to anterior)   · Soft tissue mobilization: R splenius capitis, sternocleidomastoid, scalenes  · Joint mobilization: C1/2 in prone unilateral p/a on the R side. Grade III-IV; C0/C1 flexion grade III  · Joint mobilization: Grade V C1/2 rotation in supine to the R side.  (NOT PERFORMED)  · Manual cervical traction  · Joint mobilization: grade V C7 manipulation in prone (NOT PERFORMED)      [facility] Portal  Treatment/Session Assessment: · Response to Treatment: significant tenderness to palpation remains in the R scalene musculature today. Pt. Demonstrates reduced pain with cervical rotation following session today. · Compliance with Program/Exercises: Compliant most of the time. · Recommendations/Intent for next treatment session: \"Next visit will focus on manual therapy and strengthening exercises. \".   Total Treatment Duration: 60 minutes total time  PT Patient Time In/Time Out  Time In: 1330  Time Out: 15[address] [facility]

## 2019-02-28 ENCOUNTER — HOSPITAL ENCOUNTER (OUTPATIENT)
Dept: PHYSICAL THERAPY | Age: 69
Discharge: HOME | End: 2019-02-28
Payer: MEDICARE

## 2019-02-28 PROCEDURE — 97140 MANUAL THERAPY 1/> REGIONS: CPT

## 2019-02-28 PROCEDURE — 97110 THERAPEUTIC EXERCISES: CPT

## 2019-02-28 NOTE — PROGRESS NOTES
Tracey Castaneda  : 1950  [place]  [place]: [place] [address] [facility] [address] at [address] [place] [address]  [phone]   [phone]        OUTPATIENT PHYSICAL THERAPY:Daily Note 2019   ICD-10: Treatment Diagnosis: Cervicalgia [M54.2]; Headache [R51]  Precautions/Allergies:   Patient has no known allergies. MD Orders: Evaluate and Treat MEDICAL/REFERRING DIAGNOSIS:  Neck pain [M54.2]   DATE OF ONSET: 2018  REFERRING PHYSICIAN: Jose Key MD  RETURN PHYSICIAN APPOINTMENT: TBD     INITIAL ASSESSMENT:  Mr. Zechariah Robertson presents with signs of R sided neck pain. Mobility restrictions are present in the upper cervical spine bilaterally with pain present during right sided cervical rotation. Pt. Demonstrates moderate myofascial pain additionally in the R splenius capitis, R sternocleidomastoid, and R scalene musculature. Pt. Will benefit from skilled PT including manual therapy, range of motion, and mobility exercises to address impairments identified. 18 Progress Report: Pt. Attends 10 physical therapy visits. Pt. Demonstrates improved cervical ROM in all directions. Despite improved cervical ROM, pain remains in the R posterior cervical region and R Sternocleidomastoid with cervical rotations movements bilaterally. Upper cervical joint mobility has improved but patient remains positive with the upper cervical flexion rotation test.  Pt. Will benefit from continued manual therapy and deep neck flexor strengthening to address remaining pain. 19 Progress Report: Improvements in cervical rotation and extension remain, but patient continues to experience pain during cervical rotation. PT suspects mild cervical dystonia symptoms resulting in antagonistic muscle contraction with cervical rotation.   Joint limitations remain in the upper cervical spine resulting in compensated lateral flexion during R sided cervical rotation. Pt. Rossy Sons to benefit from physical therapy but may benefit from further alternative treatment additionally. PROBLEM LIST (Impacting functional limitations):  1. Decreased Strength  2. Increased Pain  3. Decreased Activity Tolerance  4. Decreased Flexibility/Joint Mobility  5. Decreased [place] with Home Exercise Program INTERVENTIONS PLANNED:  1. Home Exercise Program (HEP)  2. Manual Therapy  3. Range of Motion (ROM)  4. Therapeutic Exercise/Strengthening   TREATMENT PLAN:  Effective Dates: 02/07/2019 TO 04/09/2019  (60 days). Frequency/Duration: 2 times a week for 60 Day(s)  GOALS: (Goals have been discussed and agreed upon with patient.)  Discharge Goals: Time Frame: 8 weeks  1. Pt. Will demonstrate 60 degrees of cervical rotation bilaterally to improve driving safety. MET  2. Pt. Will demonstrate <3/10 R sided neck pain with 8 hours of sleep to improve pain. NOT MET  3. Pt. Will score < 12% on the NDI to demonstrate improved pain and function. NOT MET  Rehabilitation Potential For Stated Goals: Good              The information in this section was collected on 11/9/18 (except where otherwise noted). HISTORY:   History of Present Injury/Illness (Reason for Referral):  Pt. Attends PT c/o R sided neck pain with gradual insidious onset about a year ago. The pain is located along the R side of the neck and aggravated with turning the head, sleeping at night, and performing daily activities. Pt. Denies injury or numbness tingling, difficulty speaking, tinnitus, or difficulty swallowing. Pt. Notes only treatment so far was massage that helped for a few hours. Pt. Would like to address pain and return to previous level of function. Past Medical History/Comorbidities:   Mr. Zack Gao  has a past medical history of Bradycardia, HLD (hyperlipidemia), Osteoarthritis, and Personal history of colonic polyps (2014).  He also has no past medical history of Aneurysm ([address] Coagulation disorder ([address] Difficult intubation, Heart failure ([address] Ill-defined condition, Malignant hyperthermia due to anesthesia, Morbid obesity ([address] Nausea & vomiting, Pseudocholinesterase deficiency, Psychiatric disorder, Unspecified adverse effect of anesthesia, or Unspecified sleep apnea. Mr. Zack Gao  has a past surgical history that includes hx gi (2007); hx colonoscopy (9/10/14); and pr abdomen surgery proc unlisted. Social History/Living Environment:     Lives with family in 2 story home  Prior Level of Function/Work/Activity:  Functioned independently without limitations. Ambulatory/Rehab Services H2 Model Falls Risk Assessment    Risk Factors:       (1)  Gender [Male] Ability to Rise from Chair:       (0)  Ability to rise in a single movement    Falls Prevention Plan:       No modifications necessary   Total: (5 or greater = High Risk): 1    ©2010 [facility] [employer]. All Rights Reserved. [facility] Patent #[id number]. Federal Law prohibits the replication, distribution or use without written permission from [facility] [employer]     Current Medications:       Current Outpatient Medications:     rosuvastatin (CRESTOR) 10 mg tablet, TAKE 1 TABLET BY MOUTH DAILY, Disp: 90 Tab, Rfl: 1    multivitamin (ONE A DAY) tablet, Take 1 tablet by mouth daily. , Disp: , Rfl:     ibuprofen (MOTRIN) 200 mg tablet, Take 200 mg by mouth every six (6) hours as needed for Pain. Hold till after surgery   Indications: PAIN, Disp: , Rfl:     cyanocobalamin (VITAMIN B12) 500 mcg tablet, Take 500 mcg by mouth daily. , Disp: , Rfl:    Date Last Reviewed:  2/28/2019   Number of Personal Factors/Comorbidities that affect the Plan of Care: 0: LOW COMPLEXITY   EXAMINATION:   Observation:       Palpation:  Upper Trapezius:-- Levator Scapulae:-- Posterior Cervical: tender on R side   Sternocleidomastoid: Tender on R side Scalenes: Tender on R side Other:       Flexibility: Limited suboccipitals, SCM, scalenes bilaterally      Range of Motion: Cervical in degrees  Flexion: 52                                       Extesnion: 42                       Right                     Left   Rotation 62 pain 68    Side Bend 20 16       Strength: Manual Muscle Test: upper quarter screen 5/5 MMT bilaterally. Cervical Flexion Endurance Test: --    Special Testing:  Spurlings Right: Left:   Upper limb Tension Test (ULTT1) -- --   Cervical Distraction -- --   Cervical flexion rotation test (+) (+)   Other:       Neuro: reflexes 2+ bilateral UE   Body Structures Involved:  1. Joints  2. Muscles Body Functions Affected:  1. Neuromusculoskeletal  2. Movement Related Activities and Participation Affected:  1. Mobility  2. Self Care   Number of elements (examined above) that affect the Plan of Care: 1-2: LOW COMPLEXITY   CLINICAL PRESENTATION:   Presentation: Stable and uncomplicated: LOW COMPLEXITY   CLINICAL DECISION MAKING:   Outcome Measure: Tool Used: Neck Disability Index (NDI)  Score:  Initial: 11/50  Most Recent: 14/50 (Date: 02/07/19 )   Interpretation of Score: The Neck Disability Index is a revised form of the Oswestry Low Back Pain Index and is designed to measure the activities of daily living in person's with neck pain. Each section is scored on a 0-5 scale, 5 representing the greatest disability. The scores of each section are added together for a total score of 50. Medical Necessity:   · Patient is expected to demonstrate progress in strength and range of motion to increase independence with sleeping, driving, and ADL's. Reason for Services/Other Comments:  · Patient will benefit from skilled PT to address impairments identified through evaluation and return to previous ADL and recreational capacity.     Use of outcome tool(s) and clinical judgement create a POC that gives a: Clear prediction of patient's progress: LOW COMPLEXITY            TREATMENT:   (In addition to Assessment/Re-Assessment sessions the following treatments were rendered)  Pre-treatment Symptoms/Complaints:  Pt. Reports improved pain levels this week. Pt. Notes he was able to move the neck almost pain free yesterday. Pain: Initial:   Pain Intensity 1: 4 /10 Post Session:  4/10     Therapeutic Exercise: (30 Minutes):  Exercises per grid below to improve mobility and strength. Required moderate verbal and manual cues to promote proper body alignment and promote proper body posture. Progressed range as indicated. Date:  2/28/2019   Activity/Exercise Parameters   Cervical rotation 4 minutes   Chin tucks (central and in slight rotation bilaterally) --   Upper trapezius stretch/scalene 3 minutes   Rows (green band) --   Foam roll (thoracic extension, chest stretch) 4 minutes   Isometric cervical extensions (green band) --   Bird dog --   Cervical rotation SNAGS --   Curl ups with chin tuck on swiss ball 3 x 15   T's on swiss ball with chin tuck 3 x 15   Chin tuck head lift 4 x 10   Cervical Proprioception (laser) --   Modified push up with chin tuck 3 x 5   Thoracic rotation with cervical rotation in sidelying --   PNF D2 in half kneeling --   Chest Press on Therapy ball with chin tuck (10#) 3 x 10       Manual Therapy (    Soft Tissue Mobilization Duration  Duration: 30 Minutes): Manual techniques to facilitate improved motion and decreased pain. (Used abbreviations: MET - muscle energy technique; PNF - proprioceptive neuromuscular facilitation; NMR - neuromuscular re-education; a/p - anterior to posterior; p/a - posterior to anterior)   · Soft tissue mobilization: R splenius capitis, sternocleidomastoid, scalenes  · Joint mobilization: C1/2 in prone unilateral p/a on the R side. Grade III-IV; C0/C1 flexion grade III  · Joint mobilization: Grade V C1/2 rotation in supine to the R side.  (NOT PERFORMED)  · Manual cervical traction  · Joint mobilization: grade V C7 manipulation in prone   · Dry needling: R scalenes, upper trapezius, and splenius capitis      [facility] Portal  Treatment/Session Assessment:    · Response to Treatment: Pt. Is advanced with cervical motor control and endurance exercises this week. Pt. Tolerates manual therapy without complaints and minimal post treatment soreness. Pt. Remains tender to palpation of the R scalenes. · Compliance with Program/Exercises: Compliant most of the time. · Recommendations/Intent for next treatment session: \"Next visit will focus on manual therapy and strengthening exercises. \".   Total Treatment Duration: 60 minutes total time  PT Patient Time In/Time Out  Time In: 6510  Time Out: 3058 [facility]

## 2019-03-05 ENCOUNTER — HOSPITAL ENCOUNTER (OUTPATIENT)
Dept: PHYSICAL THERAPY | Age: 69
Discharge: HOME | End: 2019-03-05
Payer: MEDICARE

## 2019-03-05 PROCEDURE — 97140 MANUAL THERAPY 1/> REGIONS: CPT

## 2019-03-05 PROCEDURE — 97110 THERAPEUTIC EXERCISES: CPT

## 2019-03-05 NOTE — PROGRESS NOTES
Dorita Orellana  : 1950  [place]  Secondary: formerly [facility] at [address] [place] [address]  [phone]   [phone]        OUTPATIENT PHYSICAL THERAPY:Daily Note 3/5/2019   ICD-10: Treatment Diagnosis: Cervicalgia [M54.2]; Headache [R51]  Precautions/Allergies:   Patient has no known allergies. MD Orders: Evaluate and Treat MEDICAL/REFERRING DIAGNOSIS:  Neck pain [M54.2]   DATE OF ONSET: 2018  REFERRING PHYSICIAN: Devon Riley MD  RETURN PHYSICIAN APPOINTMENT: TBD     INITIAL ASSESSMENT:  Mr. Paola Flower presents with signs of R sided neck pain. Mobility restrictions are present in the upper cervical spine bilaterally with pain present during right sided cervical rotation. Pt. Demonstrates moderate myofascial pain additionally in the R splenius capitis, R sternocleidomastoid, and R scalene musculature. Pt. Will benefit from skilled PT including manual therapy, range of motion, and mobility exercises to address impairments identified. 18 Progress Report: Pt. Attends 10 physical therapy visits. Pt. Demonstrates improved cervical ROM in all directions. Despite improved cervical ROM, pain remains in the R posterior cervical region and R Sternocleidomastoid with cervical rotations movements bilaterally. Upper cervical joint mobility has improved but patient remains positive with the upper cervical flexion rotation test.  Pt. Will benefit from continued manual therapy and deep neck flexor strengthening to address remaining pain. 19 Progress Report: Improvements in cervical rotation and extension remain, but patient continues to experience pain during cervical rotation. PT suspects mild cervical dystonia symptoms resulting in antagonistic muscle contraction with cervical rotation.   Joint limitations remain in the upper cervical spine resulting in compensated lateral flexion during R sided cervical rotation. Pt. Jayleen Guevara to benefit from physical therapy but may benefit from further alternative treatment additionally. PROBLEM LIST (Impacting functional limitations):  1. Decreased Strength  2. Increased Pain  3. Decreased Activity Tolerance  4. Decreased Flexibility/Joint Mobility  5. Decreased [place] with Home Exercise Program INTERVENTIONS PLANNED:  1. Home Exercise Program (HEP)  2. Manual Therapy  3. Range of Motion (ROM)  4. Therapeutic Exercise/Strengthening   TREATMENT PLAN:  Effective Dates: 02/07/2019 TO 04/09/2019  (60 days). Frequency/Duration: 2 times a week for 60 Day(s)  GOALS: (Goals have been discussed and agreed upon with patient.)  Discharge Goals: Time Frame: 8 weeks  1. Pt. Will demonstrate 60 degrees of cervical rotation bilaterally to improve driving safety. MET  2. Pt. Will demonstrate <3/10 R sided neck pain with 8 hours of sleep to improve pain. NOT MET  3. Pt. Will score < 12% on the NDI to demonstrate improved pain and function. NOT MET  Rehabilitation Potential For Stated Goals: Good              The information in this section was collected on 11/9/18 (except where otherwise noted). HISTORY:   History of Present Injury/Illness (Reason for Referral):  Pt. Attends PT c/o R sided neck pain with gradual insidious onset about a year ago. The pain is located along the R side of the neck and aggravated with turning the head, sleeping at night, and performing daily activities. Pt. Denies injury or numbness tingling, difficulty speaking, tinnitus, or difficulty swallowing. Pt. Notes only treatment so far was massage that helped for a few hours. Pt. Would like to address pain and return to previous level of function. Past Medical History/Comorbidities:   Mr. Kasey Villa  has a past medical history of Bradycardia, HLD (hyperlipidemia), Osteoarthritis, and Personal history of colonic polyps (2014).  He also has no past medical history of Aneurysm ([address] Coagulation disorder ([address] Difficult intubation, Heart failure ([address] Ill-defined condition, Malignant hyperthermia due to anesthesia, Morbid obesity ([address] Nausea & vomiting, Pseudocholinesterase deficiency, Psychiatric disorder, Unspecified adverse effect of anesthesia, or Unspecified sleep apnea. Mr. Remy Chang  has a past surgical history that includes hx gi (2007); hx colonoscopy (9/10/14); and pr abdomen surgery proc unlisted. Social History/Living Environment:     Lives with family in 2 story home  Prior Level of Function/Work/Activity:  Functioned independently without limitations. Ambulatory/Rehab Services H2 Model Falls Risk Assessment    Risk Factors:       (1)  Gender [Male] Ability to Rise from Chair:       (0)  Ability to rise in a single movement    Falls Prevention Plan:       No modifications necessary   Total: (5 or greater = High Risk): 1    ©2010 [facility] of [employer]. All Rights Reserved. [facility] Patent #[id number]. Federal Law prohibits the replication, distribution or use without written permission from [facility] "[employer]     Current Medications:       Current Outpatient Medications:     rosuvastatin (CRESTOR) 10 mg tablet, TAKE 1 TABLET BY MOUTH DAILY, Disp: 90 Tab, Rfl: 1    multivitamin (ONE A DAY) tablet, Take 1 tablet by mouth daily. , Disp: , Rfl:     ibuprofen (MOTRIN) 200 mg tablet, Take 200 mg by mouth every six (6) hours as needed for Pain. Hold till after surgery   Indications: PAIN, Disp: , Rfl:     cyanocobalamin (VITAMIN B12) 500 mcg tablet, Take 500 mcg by mouth daily. , Disp: , Rfl:    Date Last Reviewed:  3/5/2019   Number of Personal Factors/Comorbidities that affect the Plan of Care: 0: LOW COMPLEXITY   EXAMINATION:   Observation:       Palpation:  Upper Trapezius:-- Levator Scapulae:-- Posterior Cervical: tender on R side   Sternocleidomastoid: Tender on R side Scalenes: Tender on R side Other:       Flexibility: Limited suboccipitals, SCM, scalenes bilaterally      Range of Motion: Cervical in degrees  Flexion: 52                                       Extesnion: 42                       Right                     Left   Rotation 62 pain 68    Side Bend 20 16       Strength: Manual Muscle Test: upper quarter screen 5/5 MMT bilaterally. Cervical Flexion Endurance Test: --    Special Testing:  Spurlings Right: Left:   Upper limb Tension Test (ULTT1) -- --   Cervical Distraction -- --   Cervical flexion rotation test (+) (+)   Other:       Neuro: reflexes 2+ bilateral UE   Body Structures Involved:  1. Joints  2. Muscles Body Functions Affected:  1. Neuromusculoskeletal  2. Movement Related Activities and Participation Affected:  1. Mobility  2. Self Care   Number of elements (examined above) that affect the Plan of Care: 1-2: LOW COMPLEXITY   CLINICAL PRESENTATION:   Presentation: Stable and uncomplicated: LOW COMPLEXITY   CLINICAL DECISION MAKING:   Outcome Measure: Tool Used: Neck Disability Index (NDI)  Score:  Initial: 11/50  Most Recent: 14/50 (Date: 02/07/19 )   Interpretation of Score: The Neck Disability Index is a revised form of the Oswestry Low Back Pain Index and is designed to measure the activities of daily living in person's with neck pain. Each section is scored on a 0-5 scale, 5 representing the greatest disability. The scores of each section are added together for a total score of 50. Medical Necessity:   · Patient is expected to demonstrate progress in strength and range of motion to increase independence with sleeping, driving, and ADL's. Reason for Services/Other Comments:  · Patient will benefit from skilled PT to address impairments identified through evaluation and return to previous ADL and recreational capacity.     Use of outcome tool(s) and clinical judgement create a POC that gives a: Clear prediction of patient's progress: LOW COMPLEXITY            TREATMENT:   (In addition to Assessment/Re-Assessment sessions the following treatments were rendered)  Pre-treatment Symptoms/Complaints:  Pt. Reports stiffness in the neck over the weekend. Pain: Initial:   Pain Intensity 1: 5 /10 Post Session:  4/10     Therapeutic Exercise: (30 Minutes):  Exercises per grid below to improve mobility and strength. Required moderate verbal and manual cues to promote proper body alignment and promote proper body posture. Progressed range as indicated. Date:  3/5/2019   Activity/Exercise Parameters   Cervical rotation 4 minutes   Chin tucks (central and in slight rotation bilaterally) --   Upper trapezius stretch/scalene 3 minutes   Rows (green band) --   Foam roll (thoracic extension, chest stretch) --   Isometric cervical extensions (green band) --   Bird dog --   Cervical rotation SNAGS --   Curl ups with chin tuck on swiss ball 3 x 15   T's on swiss ball with chin tuck 3 x 15   Chin tuck head lift 4 x 15   Cervical Proprioception (laser) --   Modified push up with chin tuck --   Thoracic rotation with cervical rotation in sidelying --   PNF D2 in half kneeling 3 x 10   Chest Press on Therapy ball with chin tuck (10#) 3 x 10       Manual Therapy (    Soft Tissue Mobilization Duration  Duration: 25 Minutes): Manual techniques to facilitate improved motion and decreased pain. (Used abbreviations: MET - muscle energy technique; PNF - proprioceptive neuromuscular facilitation; NMR - neuromuscular re-education; a/p - anterior to posterior; p/a - posterior to anterior)   · Soft tissue mobilization: R splenius capitis, sternocleidomastoid, scalenes  · Joint mobilization: C1/2 in prone unilateral p/a on the R side. Grade III-IV; C0/C1 flexion grade III  · Joint mobilization: Grade V C1/2 rotation in supine to the R side.    · Manual cervical traction  · Joint mobilization: grade V C7 manipulation in prone (NOT PERFORMED)      [facility] Portal  Treatment/Session Assessment:    · Response to Treatment: Cervical flexion rotation test remains (+) bilaterally, L rotation > R rotation. Pt. Continues to demonstrate moderate myofascial tenderness in the R scalenes, splenius capitis, sternocleidomastoid, and upper trapezius musculature. · Compliance with Program/Exercises: Compliant most of the time. · Recommendations/Intent for next treatment session: \"Next visit will focus on manual therapy and strengthening exercises. \".   Total Treatment Duration: 55 minutes total time  PT Patient Time In/Time Out  Time In: 0930  Time Out: [address] [place] [address] AYLIN Ferris

## 2019-03-06 ENCOUNTER — HOSPITAL ENCOUNTER (OUTPATIENT)
Dept: PHYSICAL THERAPY | Age: 69
Discharge: HOME | End: 2019-03-06
Payer: MEDICARE

## 2019-03-06 PROCEDURE — 97110 THERAPEUTIC EXERCISES: CPT

## 2019-03-06 PROCEDURE — 97140 MANUAL THERAPY 1/> REGIONS: CPT

## 2019-03-06 NOTE — PROGRESS NOTES
Josee Terrell  : 1950  [place]  [place] [address] at [address] [place] [address]  [phone]   [phone]        OUTPATIENT PHYSICAL THERAPY:Daily Note 3/6/2019   ICD-10: Treatment Diagnosis: Cervicalgia [M54.2]; Headache [R51]  Precautions/Allergies:   Patient has no known allergies. MD Orders: Evaluate and Treat MEDICAL/REFERRING DIAGNOSIS:  Neck pain [M54.2]   DATE OF ONSET: 2018  REFERRING PHYSICIAN: Delvin Pike MD  RETURN PHYSICIAN APPOINTMENT: TBD     INITIAL ASSESSMENT:  Mr. Ernesto Ramirez presents with signs of R sided neck pain. Mobility restrictions are present in the upper cervical spine bilaterally with pain present during right sided cervical rotation. Pt. Demonstrates moderate myofascial pain additionally in the R splenius capitis, R sternocleidomastoid, and R scalene musculature. Pt. Will benefit from skilled PT including manual therapy, range of motion, and mobility exercises to address impairments identified. 18 Progress Report: Pt. Attends 10 physical therapy visits. Pt. Demonstrates improved cervical ROM in all directions. Despite improved cervical ROM, pain remains in the R posterior cervical region and R Sternocleidomastoid with cervical rotations movements bilaterally. Upper cervical joint mobility has improved but patient remains positive with the upper cervical flexion rotation test.  Pt. Will benefit from continued manual therapy and deep neck flexor strengthening to address remaining pain. 19 Progress Report: Improvements in cervical rotation and extension remain, but patient continues to experience pain during cervical rotation. PT suspects mild cervical dystonia symptoms resulting in antagonistic muscle contraction with cervical rotation.   Joint limitations remain in the upper cervical spine resulting in compensated lateral flexion during R sided cervical rotation. Pt. Paula Jacqueline to benefit from physical therapy but may benefit from further alternative treatment additionally. PROBLEM LIST (Impacting functional limitations):  1. Decreased Strength  2. Increased Pain  3. Decreased Activity Tolerance  4. Decreased Flexibility/Joint Mobility  5. Decreased [place] with Home Exercise Program INTERVENTIONS PLANNED:  1. Home Exercise Program (HEP)  2. Manual Therapy  3. Range of Motion (ROM)  4. Therapeutic Exercise/Strengthening   TREATMENT PLAN:  Effective Dates: 02/07/2019 TO 04/09/2019  (60 days). Frequency/Duration: 2 times a week for 60 Day(s)  GOALS: (Goals have been discussed and agreed upon with patient.)  Discharge Goals: Time Frame: 8 weeks  1. Pt. Will demonstrate 60 degrees of cervical rotation bilaterally to improve driving safety. MET  2. Pt. Will demonstrate <3/10 R sided neck pain with 8 hours of sleep to improve pain. NOT MET  3. Pt. Will score < 12% on the NDI to demonstrate improved pain and function. NOT MET  Rehabilitation Potential For Stated Goals: Good              The information in this section was collected on 11/9/18 (except where otherwise noted). HISTORY:   History of Present Injury/Illness (Reason for Referral):  Pt. Attends PT c/o R sided neck pain with gradual insidious onset about a year ago. The pain is located along the R side of the neck and aggravated with turning the head, sleeping at night, and performing daily activities. Pt. Denies injury or numbness tingling, difficulty speaking, tinnitus, or difficulty swallowing. Pt. Notes only treatment so far was massage that helped for a few hours. Pt. Would like to address pain and return to previous level of function. Past Medical History/Comorbidities:   Mr. Lynette Plata  has a past medical history of Bradycardia, HLD (hyperlipidemia), Osteoarthritis, and Personal history of colonic polyps (2014).  He also has no past medical history of Aneurysm ([address] Coagulation disorder ([address] Difficult intubation, Heart failure ([address] Ill-defined condition, Malignant hyperthermia due to anesthesia, Morbid obesity ([address] Nausea & vomiting, Pseudocholinesterase deficiency, Psychiatric disorder, Unspecified adverse effect of anesthesia, or Unspecified sleep apnea. Mr. Benjy Douglas  has a past surgical history that includes hx gi (2007); hx colonoscopy (9/10/14); and pr abdomen surgery proc unlisted. Social History/Living Environment:     Lives with family in 2 story home  Prior Level of Function/Work/Activity:  Functioned independently without limitations. Ambulatory/Rehab Services H2 Model Falls Risk Assessment    Risk Factors:       (1)  Gender [Male] Ability to Rise from Chair:       (0)  Ability to rise in a single movement    Falls Prevention Plan:       No modifications necessary   Total: (5 or greater = High Risk): 1    ©2010 [facility] of [employer]. All Rights Reserved. [facility] [employer] Patent #[id number]. Federal Law prohibits the replication, distribution or use without written permission from [facility] of [address] [place] [address]     Current Medications:       Current Outpatient Medications:     rosuvastatin (CRESTOR) 10 mg tablet, TAKE 1 TABLET BY MOUTH DAILY, Disp: 90 Tab, Rfl: 1    multivitamin (ONE A DAY) tablet, Take 1 tablet by mouth daily. , Disp: , Rfl:     ibuprofen (MOTRIN) 200 mg tablet, Take 200 mg by mouth every six (6) hours as needed for Pain. Hold till after surgery   Indications: PAIN, Disp: , Rfl:     cyanocobalamin (VITAMIN B12) 500 mcg tablet, Take 500 mcg by mouth daily. , Disp: , Rfl:    Date Last Reviewed:  3/6/2019   Number of Personal Factors/Comorbidities that affect the Plan of Care: 0: LOW COMPLEXITY   EXAMINATION:   Observation:       Palpation:  Upper Trapezius:-- Levator Scapulae:-- Posterior Cervical: tender on R side   Sternocleidomastoid: Tender on R side Scalenes: Tender on R side Other:       Flexibility: Limited suboccipitals, SCM, scalenes bilaterally      Range of Motion: Cervical in degrees  Flexion: 52                                       Extesnion: 42                       Right                     Left   Rotation 62 pain 68    Side Bend 20 16       Strength: Manual Muscle Test: upper quarter screen 5/5 MMT bilaterally. Cervical Flexion Endurance Test: --    Special Testing:  Spurlings Right: Left:   Upper limb Tension Test (ULTT1) -- --   Cervical Distraction -- --   Cervical flexion rotation test (+) (+)   Other:       Neuro: reflexes 2+ bilateral UE   Body Structures Involved:  1. Joints  2. Muscles Body Functions Affected:  1. Neuromusculoskeletal  2. Movement Related Activities and Participation Affected:  1. Mobility  2. Self Care   Number of elements (examined above) that affect the Plan of Care: 1-2: LOW COMPLEXITY   CLINICAL PRESENTATION:   Presentation: Stable and uncomplicated: LOW COMPLEXITY   CLINICAL DECISION MAKING:   Outcome Measure: Tool Used: Neck Disability Index (NDI)  Score:  Initial: 11/50  Most Recent: 14/50 (Date: 02/07/19 )   Interpretation of Score: The Neck Disability Index is a revised form of the Oswestry Low Back Pain Index and is designed to measure the activities of daily living in person's with neck pain. Each section is scored on a 0-5 scale, 5 representing the greatest disability. The scores of each section are added together for a total score of 50. Medical Necessity:   · Patient is expected to demonstrate progress in strength and range of motion to increase independence with sleeping, driving, and ADL's. Reason for Services/Other Comments:  · Patient will benefit from skilled PT to address impairments identified through evaluation and return to previous ADL and recreational capacity.     Use of outcome tool(s) and clinical judgement create a POC that gives a: Clear prediction of patient's progress: LOW COMPLEXITY            TREATMENT:   (In addition to Assessment/Re-Assessment sessions the following treatments were rendered)  Pre-treatment Symptoms/Complaints:  Pt. Denies new pain today. Pt. States tightness and pain remains with cervical rotation  Pain: Initial:   Pain Intensity 1: 5 /10 Post Session:  4/10     Therapeutic Exercise: (25 Minutes):  Exercises per grid below to improve mobility and strength. Required moderate verbal and manual cues to promote proper body alignment and promote proper body posture. Progressed range as indicated. Date:  3/6/2019   Activity/Exercise Parameters   Cervical rotation --   Chin tucks (central and in slight rotation bilaterally) --   Upper trapezius stretch/scalene 3 minutes   Rows (green band) --   Foam roll (thoracic extension, chest stretch) 5 minutes   Isometric cervical extensions (green band) --   Bird dog --   Cervical rotation SNAGS --   Curl ups with chin tuck on swiss ball --   T's on swiss ball with chin tuck --   Chin tuck head lift 4 x 15   Cervical Proprioception (laser) --   Modified push up with chin tuck --   Thoracic rotation with cervical rotation in sidelying 4 minutes   PNF D2 in half kneeling 3 x 10   Chest Press on Therapy ball with chin tuck (10#) 3 x 10       Manual Therapy (    Soft Tissue Mobilization Duration  Duration: 30 Minutes): Manual techniques to facilitate improved motion and decreased pain. (Used abbreviations: MET - muscle energy technique; PNF - proprioceptive neuromuscular facilitation; NMR - neuromuscular re-education; a/p - anterior to posterior; p/a - posterior to anterior)   · Soft tissue mobilization: R splenius capitis, sternocleidomastoid, scalenes  · Joint mobilization: C1/2 in prone unilateral p/a on the R side. Grade III-IV; C0/C1 flexion grade III  · Joint mobilization: Grade V C1/2 rotation in supine to the R side.  (NOT PERFORMED)   · Manual cervical traction  · Joint mobilization: grade V C7 manipulation in prone (NOT PERFORMED)  · Instrument assisted soft tissue mobilization: R sternocleidomastoid, R middle scalene, R splenius capitis. [facility] Portal  Treatment/Session Assessment:    · Response to Treatment: Pt. Demonstrates improved subjective pain levels with cervical rotation bilaterally following manual therapy techniques today. Pt. Posterior cervical musculature is less painful to palpation today and palpable trigger points are absent. · Compliance with Program/Exercises: Compliant most of the time. · Recommendations/Intent for next treatment session: \"Next visit will focus on manual therapy and strengthening exercises. \".   Total Treatment Duration: 55 minutes total time  PT Patient Time In/Time Out  Time In: 1530  Time Out: [address]

## 2019-03-11 ENCOUNTER — HOSPITAL ENCOUNTER (OUTPATIENT)
Dept: PHYSICAL THERAPY | Age: 69
Discharge: HOME | End: 2019-03-11
Payer: MEDICARE

## 2019-03-11 PROCEDURE — 97110 THERAPEUTIC EXERCISES: CPT

## 2019-03-11 PROCEDURE — 97140 MANUAL THERAPY 1/> REGIONS: CPT

## 2019-03-11 NOTE — PROGRESS NOTES
Lang Reina  : 1950  [place]  [place]: [place] [address] at [address] [place] [address]  [phone]   [phone]        OUTPATIENT PHYSICAL THERAPY:Daily Note 3/11/2019   ICD-10: Treatment Diagnosis: Cervicalgia [M54.2]; Headache [R51]  Precautions/Allergies:   Patient has no known allergies. MD Orders: Evaluate and Treat MEDICAL/REFERRING DIAGNOSIS:  Neck pain [M54.2]   DATE OF ONSET: 2018  REFERRING PHYSICIAN: Vinny Stanford MD  RETURN PHYSICIAN APPOINTMENT: TBD     INITIAL ASSESSMENT:  Mr. Guido Vásquez presents with signs of R sided neck pain. Mobility restrictions are present in the upper cervical spine bilaterally with pain present during right sided cervical rotation. Pt. Demonstrates moderate myofascial pain additionally in the R splenius capitis, R sternocleidomastoid, and R scalene musculature. Pt. Will benefit from skilled PT including manual therapy, range of motion, and mobility exercises to address impairments identified. 18 Progress Report: Pt. Attends 10 physical therapy visits. Pt. Demonstrates improved cervical ROM in all directions. Despite improved cervical ROM, pain remains in the R posterior cervical region and R Sternocleidomastoid with cervical rotations movements bilaterally. Upper cervical joint mobility has improved but patient remains positive with the upper cervical flexion rotation test.  Pt. Will benefit from continued manual therapy and deep neck flexor strengthening to address remaining pain. 19 Progress Report: Improvements in cervical rotation and extension remain, but patient continues to experience pain during cervical rotation. PT suspects mild cervical dystonia symptoms resulting in antagonistic muscle contraction with cervical rotation.   Joint limitations remain in the upper cervical spine resulting in compensated lateral flexion during R sided cervical rotation. Pt. Diego Vega to benefit from physical therapy but may benefit from further alternative treatment additionally. PROBLEM LIST (Impacting functional limitations):  1. Decreased Strength  2. Increased Pain  3. Decreased Activity Tolerance  4. Decreased Flexibility/Joint Mobility  5. Decreased [place] with Home Exercise Program INTERVENTIONS PLANNED:  1. Home Exercise Program (HEP)  2. Manual Therapy  3. Range of Motion (ROM)  4. Therapeutic Exercise/Strengthening   TREATMENT PLAN:  Effective Dates: 02/07/2019 TO 04/09/2019  (60 days). Frequency/Duration: 2 times a week for 60 Day(s)  GOALS: (Goals have been discussed and agreed upon with patient.)  Discharge Goals: Time Frame: 8 weeks  1. Pt. Will demonstrate 60 degrees of cervical rotation bilaterally to improve driving safety. MET  2. Pt. Will demonstrate <3/10 R sided neck pain with 8 hours of sleep to improve pain. NOT MET  3. Pt. Will score < 12% on the NDI to demonstrate improved pain and function. NOT MET  Rehabilitation Potential For Stated Goals: Good              The information in this section was collected on 11/9/18 (except where otherwise noted). HISTORY:   History of Present Injury/Illness (Reason for Referral):  Pt. Attends PT c/o R sided neck pain with gradual insidious onset about a year ago. The pain is located along the R side of the neck and aggravated with turning the head, sleeping at night, and performing daily activities. Pt. Denies injury or numbness tingling, difficulty speaking, tinnitus, or difficulty swallowing. Pt. Notes only treatment so far was massage that helped for a few hours. Pt. Would like to address pain and return to previous level of function. Past Medical History/Comorbidities:   Mr. Remy Chang  has a past medical history of Bradycardia, HLD (hyperlipidemia), Osteoarthritis, and Personal history of colonic polyps (2014).  He also has no past medical history of Aneurysm ([address] Coagulation disorder ([address] Difficult intubation, Heart failure ([address] Ill-defined condition, Malignant hyperthermia due to anesthesia, Morbid obesity ([address] Nausea & vomiting, Pseudocholinesterase deficiency, Psychiatric disorder, Unspecified adverse effect of anesthesia, or Unspecified sleep apnea. Mr. Guido Vásquez  has a past surgical history that includes hx gi (2007); hx colonoscopy (9/10/14); and pr abdomen surgery proc unlisted. Social History/Living Environment:     Lives with family in 2 story home  Prior Level of Function/Work/Activity:  Functioned independently without limitations. Ambulatory/Rehab Services H2 Model Falls Risk Assessment    Risk Factors:       (1)  Gender [Male] Ability to Rise from Chair:       (0)  Ability to rise in a single movement    Falls Prevention Plan:       No modifications necessary   Total: (5 or greater = High Risk): 1    ©2010 [facility] of [employer]. All Rights Reserved. [facility] [employer] Patent #[id number]. Federal Law prohibits the replication, distribution or use without written permission from [facility] of [address] [place] [address]     Current Medications:       Current Outpatient Medications:     rosuvastatin (CRESTOR) 10 mg tablet, TAKE 1 TABLET BY MOUTH DAILY, Disp: 90 Tab, Rfl: 1    multivitamin (ONE A DAY) tablet, Take 1 tablet by mouth daily. , Disp: , Rfl:     ibuprofen (MOTRIN) 200 mg tablet, Take 200 mg by mouth every six (6) hours as needed for Pain. Hold till after surgery   Indications: PAIN, Disp: , Rfl:     cyanocobalamin (VITAMIN B12) 500 mcg tablet, Take 500 mcg by mouth daily. , Disp: , Rfl:    Date Last Reviewed:  3/11/2019   Number of Personal Factors/Comorbidities that affect the Plan of Care: 0: LOW COMPLEXITY   EXAMINATION:   Observation:       Palpation:  Upper Trapezius:-- Levator Scapulae:-- Posterior Cervical: tender on R side   Sternocleidomastoid: Tender on R side Scalenes: Tender on R side Other:       Flexibility: Limited suboccipitals, SCM, scalenes bilaterally      Range of Motion: Cervical in degrees  Flexion: 52                                       Extesnion: 42                       Right                     Left   Rotation 62 pain 68    Side Bend 20 16       Strength: Manual Muscle Test: upper quarter screen 5/5 MMT bilaterally. Cervical Flexion Endurance Test: --    Special Testing:  Spurlings Right: Left:   Upper limb Tension Test (ULTT1) -- --   Cervical Distraction -- --   Cervical flexion rotation test (+) (+)   Other:       Neuro: reflexes 2+ bilateral UE   Body Structures Involved:  1. Joints  2. Muscles Body Functions Affected:  1. Neuromusculoskeletal  2. Movement Related Activities and Participation Affected:  1. Mobility  2. Self Care   Number of elements (examined above) that affect the Plan of Care: 1-2: LOW COMPLEXITY   CLINICAL PRESENTATION:   Presentation: Stable and uncomplicated: LOW COMPLEXITY   CLINICAL DECISION MAKING:   Outcome Measure: Tool Used: Neck Disability Index (NDI)  Score:  Initial: 11/50  Most Recent: 14/50 (Date: 02/07/19 )   Interpretation of Score: The Neck Disability Index is a revised form of the Oswestry Low Back Pain Index and is designed to measure the activities of daily living in person's with neck pain. Each section is scored on a 0-5 scale, 5 representing the greatest disability. The scores of each section are added together for a total score of 50. Medical Necessity:   · Patient is expected to demonstrate progress in strength and range of motion to increase independence with sleeping, driving, and ADL's. Reason for Services/Other Comments:  · Patient will benefit from skilled PT to address impairments identified through evaluation and return to previous ADL and recreational capacity.     Use of outcome tool(s) and clinical judgement create a POC that gives a: Clear prediction of patient's progress: LOW COMPLEXITY            TREATMENT:   (In addition to Assessment/Re-Assessment sessions the following treatments were rendered)  Pre-treatment Symptoms/Complaints:  Pt. Notes improved neck pain following last PT session. Pt. Reports improved pain over past 3 days. Pain: Initial:   Pain Intensity 1: 4 /10 Post Session:  4/10     Therapeutic Exercise: (40 Minutes):  Exercises per grid below to improve mobility and strength. Required moderate verbal and manual cues to promote proper body alignment and promote proper body posture. Progressed range as indicated. Date:  3/11/2019   Activity/Exercise Parameters   Cervical rotation --   Chin tucks (central and in slight rotation bilaterally) --   Upper trapezius stretch/scalene 3 minutes   Rows (green band) --   Foam roll (thoracic extension, chest stretch) 5 minutes   Isometric cervical extensions (green band) --   Bird dog 3 x 20   Cervical rotation SNAGS --   Curl ups with chin tuck on swiss ball 3 x 15   T's on swiss ball with chin tuck 3 x 15   Chin tuck head lift 4 x 15   Cervical Proprioception (laser) --   Modified push up with chin tuck 3 x 10   Thoracic rotation with cervical rotation in sidelying 4 minutes   PNF D2 in half kneeling 3 x 10   Chest Press on Therapy ball with chin tuck (10#) 3 x 10       Manual Therapy (    Soft Tissue Mobilization Duration  Duration: 15 Minutes): Manual techniques to facilitate improved motion and decreased pain. (Used abbreviations: MET - muscle energy technique; PNF - proprioceptive neuromuscular facilitation; NMR - neuromuscular re-education; a/p - anterior to posterior; p/a - posterior to anterior)   · Soft tissue mobilization: R splenius capitis, sternocleidomastoid, scalenes  · Joint mobilization: C1/2 in prone unilateral p/a on the R side. Grade III-IV; C0/C1 flexion grade III  · Joint mobilization: Grade V C1/2 rotation in supine to the R side.  (NOT PERFORMED)   · Manual cervical traction  · Joint mobilization: grade V C7 manipulation in prone (NOT PERFORMED)  · Instrument assisted soft tissue mobilization: R sternocleidomastoid, R middle scalene, R splenius capitis. (NOT PERFROMED)      [employer] Portal  Treatment/Session Assessment:    · Response to Treatment: R sided cervical rotation is pain free today. Pt. Continues to experience mild muscle spasm with pain during L sided cervical rotation. Cervical endurance continues to improve with prescribed exercises for the cervical flexors and extensors. · Compliance with Program/Exercises: Compliant most of the time. · Recommendations/Intent for next treatment session: \"Next visit will focus on manual therapy and strengthening exercises. \".   Total Treatment Duration: 55 minutes total time  PT Patient Time In/Time Out  Time In: 1530  Time Out: [address]

## 2019-03-12 ENCOUNTER — APPOINTMENT (OUTPATIENT)
Dept: PHYSICAL THERAPY | Age: 69
End: 2019-03-12
Payer: MEDICARE

## 2019-03-12 ENCOUNTER — HOSPITAL ENCOUNTER (OUTPATIENT)
Dept: PHYSICAL THERAPY | Age: 69
Discharge: HOME | End: 2019-03-12
Payer: MEDICARE

## 2019-03-12 PROCEDURE — 97110 THERAPEUTIC EXERCISES: CPT

## 2019-03-12 PROCEDURE — 97140 MANUAL THERAPY 1/> REGIONS: CPT

## 2019-03-12 NOTE — PROGRESS NOTES
Fatoumata Medina  : 1950  [place]  [place]: [place] [address] at [address] [place] [address]  [phone]   [phone]        OUTPATIENT PHYSICAL THERAPY:Daily Note 3/12/2019   ICD-10: Treatment Diagnosis: Cervicalgia [M54.2]; Headache [R51]  Precautions/Allergies:   Patient has no known allergies. MD Orders: Evaluate and Treat MEDICAL/REFERRING DIAGNOSIS:  Neck pain [M54.2]   DATE OF ONSET: 2018  REFERRING PHYSICIAN: Cora Lynn MD  RETURN PHYSICIAN APPOINTMENT: TBD     INITIAL ASSESSMENT:  Mr. Martin Mao presents with signs of R sided neck pain. Mobility restrictions are present in the upper cervical spine bilaterally with pain present during right sided cervical rotation. Pt. Demonstrates moderate myofascial pain additionally in the R splenius capitis, R sternocleidomastoid, and R scalene musculature. Pt. Will benefit from skilled PT including manual therapy, range of motion, and mobility exercises to address impairments identified. 18 Progress Report: Pt. Attends 10 physical therapy visits. Pt. Demonstrates improved cervical ROM in all directions. Despite improved cervical ROM, pain remains in the R posterior cervical region and R Sternocleidomastoid with cervical rotations movements bilaterally. Upper cervical joint mobility has improved but patient remains positive with the upper cervical flexion rotation test.  Pt. Will benefit from continued manual therapy and deep neck flexor strengthening to address remaining pain. 19 Progress Report: Improvements in cervical rotation and extension remain, but patient continues to experience pain during cervical rotation. PT suspects mild cervical dystonia symptoms resulting in antagonistic muscle contraction with cervical rotation.   Joint limitations remain in the upper cervical spine resulting in compensated lateral flexion during R sided cervical rotation. Pt. Enrrique Rank to benefit from physical therapy but may benefit from further alternative treatment additionally. PROBLEM LIST (Impacting functional limitations):  1. Decreased Strength  2. Increased Pain  3. Decreased Activity Tolerance  4. Decreased Flexibility/Joint Mobility  5. Decreased [place] with Home Exercise Program INTERVENTIONS PLANNED:  1. Home Exercise Program (HEP)  2. Manual Therapy  3. Range of Motion (ROM)  4. Therapeutic Exercise/Strengthening   TREATMENT PLAN:  Effective Dates: 02/07/2019 TO 04/09/2019  (60 days). Frequency/Duration: 2 times a week for 60 Day(s)  GOALS: (Goals have been discussed and agreed upon with patient.)  Discharge Goals: Time Frame: 8 weeks  1. Pt. Will demonstrate 60 degrees of cervical rotation bilaterally to improve driving safety. MET  2. Pt. Will demonstrate <3/10 R sided neck pain with 8 hours of sleep to improve pain. NOT MET  3. Pt. Will score < 12% on the NDI to demonstrate improved pain and function. NOT MET  Rehabilitation Potential For Stated Goals: Good              The information in this section was collected on 11/9/18 (except where otherwise noted). HISTORY:   History of Present Injury/Illness (Reason for Referral):  Pt. Attends PT c/o R sided neck pain with gradual insidious onset about a year ago. The pain is located along the R side of the neck and aggravated with turning the head, sleeping at night, and performing daily activities. Pt. Denies injury or numbness tingling, difficulty speaking, tinnitus, or difficulty swallowing. Pt. Notes only treatment so far was massage that helped for a few hours. Pt. Would like to address pain and return to previous level of function. Past Medical History/Comorbidities:   Mr. Pramod Duffy  has a past medical history of Bradycardia, HLD (hyperlipidemia), Osteoarthritis, and Personal history of colonic polyps (2014).  He also has no past medical history of Aneurysm ([address] Coagulation disorder ([address] Difficult intubation, Heart failure ([address] Ill-defined condition, Malignant hyperthermia due to anesthesia, Morbid obesity ([address] Nausea & vomiting, Pseudocholinesterase deficiency, Psychiatric disorder, Unspecified adverse effect of anesthesia, or Unspecified sleep apnea. Mr. Nesha Ojeda  has a past surgical history that includes hx gi (2007); hx colonoscopy (9/10/14); and pr abdomen surgery proc unlisted. Social History/Living Environment:     Lives with family in 2 story home  Prior Level of Function/Work/Activity:  Functioned independently without limitations. Ambulatory/Rehab Services H2 Model Falls Risk Assessment    Risk Factors:       (1)  Gender [Male] Ability to Rise from Chair:       (0)  Ability to rise in a single movement    Falls Prevention Plan:       No modifications necessary   Total: (5 or greater = High Risk): 1    ©2010 [facility] of [employer]. All Rights Reserved. [facility] Patent #[id number]. Federal Law prohibits the replication, distribution or use without written permission from [facility] [employer]     Current Medications:       Current Outpatient Medications:     rosuvastatin (CRESTOR) 10 mg tablet, TAKE 1 TABLET BY MOUTH DAILY, Disp: 90 Tab, Rfl: 1    multivitamin (ONE A DAY) tablet, Take 1 tablet by mouth daily. , Disp: , Rfl:     ibuprofen (MOTRIN) 200 mg tablet, Take 200 mg by mouth every six (6) hours as needed for Pain. Hold till after surgery   Indications: PAIN, Disp: , Rfl:     cyanocobalamin (VITAMIN B12) 500 mcg tablet, Take 500 mcg by mouth daily. , Disp: , Rfl:    Date Last Reviewed:  3/12/2019   Number of Personal Factors/Comorbidities that affect the Plan of Care: 0: LOW COMPLEXITY   EXAMINATION:   Observation:       Palpation:  Upper Trapezius:-- Levator Scapulae:-- Posterior Cervical: tender on R side   Sternocleidomastoid: Tender on R side Scalenes: Tender on R side Other:       Flexibility: Limited suboccipitals, SCM, scalenes bilaterally      Range of Motion: Cervical in degrees  Flexion: 52                                       Extesnion: 42                       Right                     Left   Rotation 62 pain 68    Side Bend 20 16       Strength: Manual Muscle Test: upper quarter screen 5/5 MMT bilaterally. Cervical Flexion Endurance Test: --    Special Testing:  Spurlings Right: Left:   Upper limb Tension Test (ULTT1) -- --   Cervical Distraction -- --   Cervical flexion rotation test (+) (+)   Other:       Neuro: reflexes 2+ bilateral UE   Body Structures Involved:  1. Joints  2. Muscles Body Functions Affected:  1. Neuromusculoskeletal  2. Movement Related Activities and Participation Affected:  1. Mobility  2. Self Care   Number of elements (examined above) that affect the Plan of Care: 1-2: LOW COMPLEXITY   CLINICAL PRESENTATION:   Presentation: Stable and uncomplicated: LOW COMPLEXITY   CLINICAL DECISION MAKING:   Outcome Measure: Tool Used: Neck Disability Index (NDI)  Score:  Initial: 11/50  Most Recent: 14/50 (Date: 02/07/19 )   Interpretation of Score: The Neck Disability Index is a revised form of the Oswestry Low Back Pain Index and is designed to measure the activities of daily living in person's with neck pain. Each section is scored on a 0-5 scale, 5 representing the greatest disability. The scores of each section are added together for a total score of 50. Medical Necessity:   · Patient is expected to demonstrate progress in strength and range of motion to increase independence with sleeping, driving, and ADL's. Reason for Services/Other Comments:  · Patient will benefit from skilled PT to address impairments identified through evaluation and return to previous ADL and recreational capacity.     Use of outcome tool(s) and clinical judgement create a POC that gives a: Clear prediction of patient's progress: LOW COMPLEXITY            TREATMENT:   (In addition to Assessment/Re-Assessment sessions the following treatments were rendered)  Pre-treatment Symptoms/Complaints:  Pt. Reports increased stiffness in the neck today but he has been at the computer most of the day. Pain: Initial:   Pain Intensity 1: 4 /10 Post Session:  4/10     Therapeutic Exercise: (10 Minutes):  Exercises per grid below to improve mobility and strength. Required moderate verbal and manual cues to promote proper body alignment and promote proper body posture. Progressed range as indicated. Date:  3/12/2019   Activity/Exercise Parameters   Cervical rotation --   Chin tucks (central and in slight rotation bilaterally) --   Upper trapezius stretch/scalene 3 minutes   Rows (green band) --   Foam roll (thoracic extension, chest stretch) 5 minutes   Isometric cervical extensions (green band) --   Bird dog --   Cervical rotation SNAGS --   Curl ups with chin tuck on swiss ball --   T's on swiss ball with chin tuck --   Chin tuck head lift 4 x 15   Cervical Proprioception (laser) --   Modified push up with chin tuck --   Thoracic rotation with cervical rotation in sidelying --   PNF D2 in half kneeling --   Chest Press on Therapy ball with chin tuck (10#) --       Manual Therapy (    Soft Tissue Mobilization Duration  Duration: 30 Minutes): Manual techniques to facilitate improved motion and decreased pain. (Used abbreviations: MET - muscle energy technique; PNF - proprioceptive neuromuscular facilitation; NMR - neuromuscular re-education; a/p - anterior to posterior; p/a - posterior to anterior)   · Soft tissue mobilization: R splenius capitis, sternocleidomastoid, scalenes  · Joint mobilization: C1/2 in prone unilateral p/a on the R side. Grade III-IV; C0/C1 flexion grade III  · Joint mobilization: Grade V C1/2 rotation in supine to the R side.     · Manual cervical traction  · Joint mobilization: grade V C7 manipulation in prone (NOT PERFORMED)  · Instrument assisted soft tissue mobilization: R sternocleidomastoid, R middle scalene, R splenius capitis. [employer] Portal  Treatment/Session Assessment:    · Response to Treatment: Pt. Tolerates dry needling today with mild pain complaints and minimal post treatment soreness. Subjective stiffness at onset of session improves post treatment. · Compliance with Program/Exercises: Compliant most of the time. · Recommendations/Intent for next treatment session: \"Next visit will focus on manual therapy and strengthening exercises. \".   Total Treatment Duration: 40 minutes total time  PT Patient Time In/Time Out  Time In: 1530  Time Out: [address] PT

## 2019-03-19 ENCOUNTER — HOSPITAL ENCOUNTER (OUTPATIENT)
Dept: PHYSICAL THERAPY | Age: 69
Discharge: HOME | End: 2019-03-19
Payer: MEDICARE

## 2019-03-19 PROCEDURE — 97140 MANUAL THERAPY 1/> REGIONS: CPT

## 2019-03-19 PROCEDURE — 97110 THERAPEUTIC EXERCISES: CPT

## 2019-03-19 NOTE — PROGRESS NOTES
Daniel Quiñonez  : 1950  [place]  [place] [address] at [address] [place] [address]  [phone]   [phone]        OUTPATIENT PHYSICAL THERAPY:Daily Note 3/19/2019   ICD-10: Treatment Diagnosis: Cervicalgia [M54.2]; Headache [R51]  Precautions/Allergies:   Patient has no known allergies. MD Orders: Evaluate and Treat MEDICAL/REFERRING DIAGNOSIS:  Neck pain [M54.2]   DATE OF ONSET: 2018  REFERRING PHYSICIAN: Crystal Stein MD  RETURN PHYSICIAN APPOINTMENT: TBD     INITIAL ASSESSMENT:  Mr. Quan Pettit presents with signs of R sided neck pain. Mobility restrictions are present in the upper cervical spine bilaterally with pain present during right sided cervical rotation. Pt. Demonstrates moderate myofascial pain additionally in the R splenius capitis, R sternocleidomastoid, and R scalene musculature. Pt. Will benefit from skilled PT including manual therapy, range of motion, and mobility exercises to address impairments identified. 18 Progress Report: Pt. Attends 10 physical therapy visits. Pt. Demonstrates improved cervical ROM in all directions. Despite improved cervical ROM, pain remains in the R posterior cervical region and R Sternocleidomastoid with cervical rotations movements bilaterally. Upper cervical joint mobility has improved but patient remains positive with the upper cervical flexion rotation test.  Pt. Will benefit from continued manual therapy and deep neck flexor strengthening to address remaining pain. 19 Progress Report: Improvements in cervical rotation and extension remain, but patient continues to experience pain during cervical rotation. PT suspects mild cervical dystonia symptoms resulting in antagonistic muscle contraction with cervical rotation.   Joint limitations remain in the upper cervical spine resulting in compensated lateral flexion during R sided cervical rotation. Pt. Senia Blackman to benefit from physical therapy but may benefit from further alternative treatment additionally. PROBLEM LIST (Impacting functional limitations):  1. Decreased Strength  2. Increased Pain  3. Decreased Activity Tolerance  4. Decreased Flexibility/Joint Mobility  5. Decreased [place] with Home Exercise Program INTERVENTIONS PLANNED:  1. Home Exercise Program (HEP)  2. Manual Therapy  3. Range of Motion (ROM)  4. Therapeutic Exercise/Strengthening   TREATMENT PLAN:  Effective Dates: 02/07/2019 TO 04/09/2019  (60 days). Frequency/Duration: 2 times a week for 60 Day(s)  GOALS: (Goals have been discussed and agreed upon with patient.)  Discharge Goals: Time Frame: 8 weeks  1. Pt. Will demonstrate 60 degrees of cervical rotation bilaterally to improve driving safety. MET  2. Pt. Will demonstrate <3/10 R sided neck pain with 8 hours of sleep to improve pain. NOT MET  3. Pt. Will score < 12% on the NDI to demonstrate improved pain and function. NOT MET  Rehabilitation Potential For Stated Goals: Good              The information in this section was collected on 11/9/18 (except where otherwise noted). HISTORY:   History of Present Injury/Illness (Reason for Referral):  Pt. Attends PT c/o R sided neck pain with gradual insidious onset about a year ago. The pain is located along the R side of the neck and aggravated with turning the head, sleeping at night, and performing daily activities. Pt. Denies injury or numbness tingling, difficulty speaking, tinnitus, or difficulty swallowing. Pt. Notes only treatment so far was massage that helped for a few hours. Pt. Would like to address pain and return to previous level of function. Past Medical History/Comorbidities:   Mr. Alissa Lazcano  has a past medical history of Bradycardia, HLD (hyperlipidemia), Osteoarthritis, and Personal history of colonic polyps (2014).  He also has no past medical history of Aneurysm ([address] Coagulation disorder ([address] Difficult intubation, Heart failure ([address] Ill-defined condition, Malignant hyperthermia due to anesthesia, Morbid obesity ([address] Nausea & vomiting, Pseudocholinesterase deficiency, Psychiatric disorder, Unspecified adverse effect of anesthesia, or Unspecified sleep apnea. Mr. Ad Guzman  has a past surgical history that includes hx gi (2007); hx colonoscopy (9/10/14); and pr abdomen surgery proc unlisted. Social History/Living Environment:     Lives with family in 2 story home  Prior Level of Function/Work/Activity:  Functioned independently without limitations. Ambulatory/Rehab Services H2 Model Falls Risk Assessment    Risk Factors:       (1)  Gender [Male] Ability to Rise from Chair:       (0)  Ability to rise in a single movement    Falls Prevention Plan:       No modifications necessary   Total: (5 or greater = High Risk): 1    ©2010 [facility] of [employer]. All Rights Reserved. [facility] Patent #[id number]. Federal Law prohibits the replication, distribution or use without written permission from [facility] [employer]     Current Medications:       Current Outpatient Medications:     rosuvastatin (CRESTOR) 10 mg tablet, TAKE 1 TABLET BY MOUTH DAILY, Disp: 90 Tab, Rfl: 1    multivitamin (ONE A DAY) tablet, Take 1 tablet by mouth daily. , Disp: , Rfl:     ibuprofen (MOTRIN) 200 mg tablet, Take 200 mg by mouth every six (6) hours as needed for Pain. Hold till after surgery   Indications: PAIN, Disp: , Rfl:     cyanocobalamin (VITAMIN B12) 500 mcg tablet, Take 500 mcg by mouth daily. , Disp: , Rfl:    Date Last Reviewed:  3/19/2019   Number of Personal Factors/Comorbidities that affect the Plan of Care: 0: LOW COMPLEXITY   EXAMINATION:   Observation:       Palpation:  Upper Trapezius:-- Levator Scapulae:-- Posterior Cervical: tender on R side   Sternocleidomastoid: Tender on R side Scalenes: Tender on R side Other:       Flexibility: Limited suboccipitals, SCM, scalenes bilaterally      Range of Motion: Cervical in degrees  Flexion: 52                                       Extesnion: 42                       Right                     Left   Rotation 62 pain 68    Side Bend 20 16       Strength: Manual Muscle Test: upper quarter screen 5/5 MMT bilaterally. Cervical Flexion Endurance Test: --    Special Testing:  Spurlings Right: Left:   Upper limb Tension Test (ULTT1) -- --   Cervical Distraction -- --   Cervical flexion rotation test (+) (+)   Other:       Neuro: reflexes 2+ bilateral UE   Body Structures Involved:  1. Joints  2. Muscles Body Functions Affected:  1. Neuromusculoskeletal  2. Movement Related Activities and Participation Affected:  1. Mobility  2. Self Care   Number of elements (examined above) that affect the Plan of Care: 1-2: LOW COMPLEXITY   CLINICAL PRESENTATION:   Presentation: Stable and uncomplicated: LOW COMPLEXITY   CLINICAL DECISION MAKING:   Outcome Measure: Tool Used: Neck Disability Index (NDI)  Score:  Initial: 11/50  Most Recent: 14/50 (Date: 02/07/19 )   Interpretation of Score: The Neck Disability Index is a revised form of the Oswestry Low Back Pain Index and is designed to measure the activities of daily living in person's with neck pain. Each section is scored on a 0-5 scale, 5 representing the greatest disability. The scores of each section are added together for a total score of 50. Medical Necessity:   · Patient is expected to demonstrate progress in strength and range of motion to increase independence with sleeping, driving, and ADL's. Reason for Services/Other Comments:  · Patient will benefit from skilled PT to address impairments identified through evaluation and return to previous ADL and recreational capacity.     Use of outcome tool(s) and clinical judgement create a POC that gives a: Clear prediction of patient's progress: LOW COMPLEXITY            TREATMENT:   (In addition to Assessment/Re-Assessment sessions the following treatments were rendered)  Pre-treatment Symptoms/Complaints:  Pt. Reports overall improved symptoms. R sided neck tightness remains with cervical rotation. Pt. Will be out of town the rest of the week. Pain: Initial:   Pain Intensity 1: 4 /10 Post Session:  4/10     Therapeutic Exercise: (20 Minutes):  Exercises per grid below to improve mobility and strength. Required moderate verbal and manual cues to promote proper body alignment and promote proper body posture. Progressed range as indicated. Date:  3/19/2019   Activity/Exercise Parameters   Cervical rotation --   Chin tucks (central and in slight rotation bilaterally) --   Upper trapezius stretch/scalene 3 minutes   Rows (green band) --   Foam roll (thoracic extension, chest stretch) 5 minutes   Isometric cervical extensions (green band) --   Bird dog --   Cervical rotation SNAGS --   Curl ups with chin tuck on swiss ball --   T's on swiss ball with chin tuck 3 x 15   Chin tuck head lift --   Cervical Proprioception (laser) --   Modified push up with chin tuck 3 x 5   Thoracic rotation with cervical rotation in sidelying --   PNF D2 in half kneeling --   Chest Press on Therapy ball with chin tuck (10#) 3 x 10       Manual Therapy (    Soft Tissue Mobilization Duration  Duration: 30 Minutes): Manual techniques to facilitate improved motion and decreased pain. (Used abbreviations: MET - muscle energy technique; PNF - proprioceptive neuromuscular facilitation; NMR - neuromuscular re-education; a/p - anterior to posterior; p/a - posterior to anterior)   · Soft tissue mobilization: R splenius capitis, sternocleidomastoid, scalenes  · Joint mobilization: C1/2 in prone unilateral p/a on the R side. Grade III-IV; C0/C1 flexion grade III  · Joint mobilization: Grade V C1/2 rotation in supine to the R side.     · Manual cervical traction  · Joint mobilization: grade V C7 manipulation in prone   · Instrument assisted soft tissue mobilization: R sternocleidomastoid, R middle scalene, R splenius capitis. [employer] Portal  Treatment/Session Assessment:    · Response to Treatment: Pt. Is less tender to palpation of the R SCM and posterior cervical musculature today. Pain remains with palpation of the R splenius capitis. Manual therapy and dry needling techniques continue to improve subjective pain. · Compliance with Program/Exercises: Compliant all of the time. · Recommendations/Intent for next treatment session: \"Next visit will focus on manual therapy and strengthening exercises. \".   Total Treatment Duration: 50 minutes total time  PT Patient Time In/Time Out  Time In: 1530  Time Out: [address] [place]

## 2019-03-21 ENCOUNTER — APPOINTMENT (OUTPATIENT)
Dept: PHYSICAL THERAPY | Age: 69
End: 2019-03-21
Payer: MEDICARE

## 2019-03-26 ENCOUNTER — HOSPITAL ENCOUNTER (OUTPATIENT)
Dept: PHYSICAL THERAPY | Age: 69
Discharge: HOME | End: 2019-03-26
Payer: MEDICARE

## 2019-03-26 PROCEDURE — 97140 MANUAL THERAPY 1/> REGIONS: CPT

## 2019-03-26 PROCEDURE — 97110 THERAPEUTIC EXERCISES: CPT

## 2019-03-26 NOTE — PROGRESS NOTES
Daiana Agrawal  : 1950  [place]  [place] [address] at [place] [address], [address] [facility]  [phone]   [phone]        OUTPATIENT PHYSICAL THERAPY:Daily Note and Progress Report 3/26/2019   ICD-10: Treatment Diagnosis: Cervicalgia [M54.2]; Headache [R51]  Precautions/Allergies:   Patient has no known allergies. MD Orders: Evaluate and Treat MEDICAL/REFERRING DIAGNOSIS:  Neck pain [M54.2]   DATE OF ONSET: 2018  REFERRING PHYSICIAN: Lea Rangel MD  RETURN PHYSICIAN APPOINTMENT: TBD     INITIAL ASSESSMENT:  Mr. Lynette Plata presents with signs of R sided neck pain. Mobility restrictions are present in the upper cervical spine bilaterally with pain present during right sided cervical rotation. Pt. Demonstrates moderate myofascial pain additionally in the R splenius capitis, R sternocleidomastoid, and R scalene musculature. Pt. Will benefit from skilled PT including manual therapy, range of motion, and mobility exercises to address impairments identified. 18 Progress Report: Pt. Attends 10 physical therapy visits. Pt. Demonstrates improved cervical ROM in all directions. Despite improved cervical ROM, pain remains in the R posterior cervical region and R Sternocleidomastoid with cervical rotations movements bilaterally. Upper cervical joint mobility has improved but patient remains positive with the upper cervical flexion rotation test.  Pt. Will benefit from continued manual therapy and deep neck flexor strengthening to address remaining pain. 19 Progress Report: Improvements in cervical rotation and extension remain, but patient continues to experience pain during cervical rotation. PT suspects mild cervical dystonia symptoms resulting in antagonistic muscle contraction with cervical rotation.   Joint limitations remain in the upper cervical spine resulting in compensated lateral flexion during R sided cervical rotation. Pt. Senia Blackman to benefit from physical therapy but may benefit from further alternative treatment additionally. 3/26/19 Progress Report: Re-assessment today is performed during an acute flare up of symptoms [place] [facility] [place] [facility] [place] Pt. Was making good progress toward pain free cervical ROM and advancement of cervical and scapular strengthening exercises. Due to flare up, subjective pain levels and functional outcome measures are worse at this time. Pt. Overall has improved since starting PT but again is encouraged to f/u with referring M.D. For further alternative treatment. Pt. Will benefit from continued PT to address pain and ADL deficits. PROBLEM LIST (Impacting functional limitations):  1. Decreased Strength  2. Increased Pain  3. Decreased Activity Tolerance  4. Decreased Flexibility/Joint Mobility  5. Decreased [place] with Home Exercise Program INTERVENTIONS PLANNED:  1. Home Exercise Program (HEP)  2. Manual Therapy  3. Range of Motion (ROM)  4. Therapeutic Exercise/Strengthening   TREATMENT PLAN:  Effective Dates: 02/07/2019 TO 04/09/2019  (60 days). Frequency/Duration: 2 times a week for 60 Day(s)  GOALS: (Goals have been discussed and agreed upon with patient.)  Discharge Goals: Time Frame: 8 weeks  1. Pt. Will demonstrate 60 degrees of cervical rotation bilaterally to improve driving safety. MET  2. Pt. Will demonstrate <3/10 R sided neck pain with 8 hours of sleep to improve pain. NOT MET  3. Pt. Will score < 12% on the NDI to demonstrate improved pain and function. NOT MET  Rehabilitation Potential For Stated Goals: Good              The information in this section was collected on 11/9/18 (except where otherwise noted). HISTORY:   History of Present Injury/Illness (Reason for Referral):  Pt. Attends PT c/o R sided neck pain with gradual insidious onset about a year ago.   The pain is located along the R side of the neck and aggravated with turning the head, sleeping at night, and performing daily activities. Pt. Denies injury or numbness tingling, difficulty speaking, tinnitus, or difficulty swallowing. Pt. Notes only treatment so far was massage that helped for a few hours. Pt. Would like to address pain and return to previous level of function. Past Medical History/Comorbidities:   Mr. Martha Hubbard  has a past medical history of Bradycardia, HLD (hyperlipidemia), Osteoarthritis, and Personal history of colonic polyps (2014). He also has no past medical history of Aneurysm ([address] Coagulation disorder ([address] Difficult intubation, Heart failure ([address] Ill-defined condition, Malignant hyperthermia due to anesthesia, Morbid obesity ([address] Nausea & vomiting, Pseudocholinesterase deficiency, Psychiatric disorder, Unspecified adverse effect of anesthesia, or Unspecified sleep apnea. Mr. Martha Hubbard  has a past surgical history that includes hx gi (2007); hx colonoscopy (9/10/14); and pr abdomen surgery proc unlisted. Social History/Living Environment:     Lives with family in 2 story home  Prior Level of Function/Work/Activity:  Functioned independently without limitations. Ambulatory/Rehab Services H2 Model Falls Risk Assessment    Risk Factors:       (1)  Gender [Male] Ability to Rise from Chair:       (0)  Ability to rise in a single movement    Falls Prevention Plan:       No modifications necessary   Total: (5 or greater = High Risk): 1    ©2010 [facility] [employer]. All Rights Reserved. [facility] Patent #[id number]. Federal Law prohibits the replication, distribution or use without written permission from [facility] [employer]     Current Medications:       Current Outpatient Medications:     rosuvastatin (CRESTOR) 10 mg tablet, TAKE 1 TABLET BY MOUTH DAILY, Disp: 90 Tab, Rfl: 1    multivitamin (ONE A DAY) tablet, Take 1 tablet by mouth daily. , Disp: , Rfl:     ibuprofen (MOTRIN) 200 mg tablet, Take 200 mg by mouth every six (6) hours as needed for Pain. Hold till after surgery   Indications: PAIN, Disp: , Rfl:     cyanocobalamin (VITAMIN B12) 500 mcg tablet, Take 500 mcg by mouth daily. , Disp: , Rfl:    Date Last Reviewed:  3/26/2019   Number of Personal Factors/Comorbidities that affect the Plan of Care: 0: LOW COMPLEXITY   EXAMINATION:   Observation:       Palpation:  Upper Trapezius:-- Levator Scapulae:-- Posterior Cervical: tender on R side   Sternocleidomastoid: Tender on R side Scalenes: Tender on R side Other:       Flexibility: Limited suboccipitals, SCM, scalenes bilaterally      Range of Motion: Cervical in degrees  Flexion: 50                                       Extesnion: 41                       Right                     Left   Rotation 61 pain 62    Side Bend 20 16       Strength: Manual Muscle Test: upper quarter screen 5/5 MMT bilaterally. Cervical Flexion Endurance Test: --    Special Testing:  Spurlings Right: Left:   Upper limb Tension Test (ULTT1) -- --   Cervical Distraction -- --   Cervical flexion rotation test (+) (+)   Other:       Neuro: reflexes 2+ bilateral UE   Body Structures Involved:  1. Joints  2. Muscles Body Functions Affected:  1. Neuromusculoskeletal  2. Movement Related Activities and Participation Affected:  1. Mobility  2. Self Care   Number of elements (examined above) that affect the Plan of Care: 1-2: LOW COMPLEXITY   CLINICAL PRESENTATION:   Presentation: Stable and uncomplicated: LOW COMPLEXITY   CLINICAL DECISION MAKING:   Outcome Measure: Tool Used: Neck Disability Index (NDI)  Score:  Initial: 11/50  Most Recent: 23/50 (Date: 03/26/19 )   Interpretation of Score: The Neck Disability Index is a revised form of the Oswestry Low Back Pain Index and is designed to measure the activities of daily living in person's with neck pain. Each section is scored on a 0-5 scale, 5 representing the greatest disability.   The scores of each section are added together for a total score of 50. Medical Necessity:   · Patient is expected to demonstrate progress in strength and range of motion to increase independence with sleeping, driving, and ADL's. Reason for Services/Other Comments:  · Patient will benefit from skilled PT to address impairments identified through evaluation and return to previous ADL and recreational capacity. Use of outcome tool(s) and clinical judgement create a POC that gives a: Clear prediction of patient's progress: LOW COMPLEXITY            TREATMENT:   (In addition to Assessment/Re-Assessment sessions the following treatments were rendered)  Pre-treatment Symptoms/Complaints:  Pt. Notes increased pain in the neck following trip to [place] last week. Pt. Notes moderate pain turning the head and neck. Pain: Initial:   Pain Intensity 1: 5 /10 Post Session:  5/10     Therapeutic Exercise: (10 Minutes):  Exercises per grid below to improve mobility and strength. Required moderate verbal and manual cues to promote proper body alignment and promote proper body posture. Progressed range as indicated. Date:  3/26/2019   Activity/Exercise Parameters   Cervical rotation 2 minutes   Chin tucks (central and in slight rotation bilaterally) --   Upper trapezius stretch/scalene 3 minutes   Rows (green band) --   Foam roll (thoracic extension, chest stretch) 5 minutes   Isometric cervical extensions (green band) --   Bird dog --   Cervical rotation SNAGS --   Curl ups with chin tuck on swiss ball --   T's on swiss ball with chin tuck --   Chin tuck head lift --   Cervical Proprioception (laser) --   Modified push up with chin tuck --   Thoracic rotation with cervical rotation in sidelying --   PNF D2 in half kneeling --   Chest Press on Therapy ball with chin tuck (10#) --       Manual Therapy (    Soft Tissue Mobilization Duration  Duration: 30 Minutes): Manual techniques to facilitate improved motion and decreased pain.  (Used abbreviations: MET - muscle energy technique; PNF - proprioceptive neuromuscular facilitation; NMR - neuromuscular re-education; a/p - anterior to posterior; p/a - posterior to anterior)   · Soft tissue mobilization: R splenius capitis, sternocleidomastoid, scalenes  · Joint mobilization: C1/2 in prone unilateral p/a on the R side. Grade III-IV; C0/C1 flexion grade III  · Joint mobilization: Grade V C1/2 rotation in supine to the R side. (NOT PERFORMED)  · Manual cervical traction  · Joint mobilization: grade V C7 manipulation in prone (NOT PERFORMED)      [facility] Portal  Treatment/Session Assessment:    · Response to Treatment: Increased pain remains today following manual therapy techniques. Pt. Is advised to perform gentle ROM exercises to address acute flare up. · Compliance with Program/Exercises: Compliant all of the time. · Recommendations/Intent for next treatment session: \"Next visit will focus on manual therapy and strengthening exercises. \".   Total Treatment Duration: 40 minutes total time  PT Patient Time In/Time Out  Time In: 1530  Time Out: [address]

## 2019-03-28 ENCOUNTER — HOSPITAL ENCOUNTER (OUTPATIENT)
Dept: PHYSICAL THERAPY | Age: 69
Discharge: HOME | End: 2019-03-28
Payer: MEDICARE

## 2019-03-28 PROCEDURE — 97140 MANUAL THERAPY 1/> REGIONS: CPT

## 2019-03-28 PROCEDURE — 97110 THERAPEUTIC EXERCISES: CPT

## 2019-04-02 ENCOUNTER — APPOINTMENT (OUTPATIENT)
Dept: PHYSICAL THERAPY | Age: 69
End: 2019-04-02
Payer: MEDICARE

## 2019-04-04 ENCOUNTER — APPOINTMENT (OUTPATIENT)
Dept: PHYSICAL THERAPY | Age: 69
End: 2019-04-04
Payer: MEDICARE

## 2019-04-09 ENCOUNTER — HOSPITAL ENCOUNTER (OUTPATIENT)
Dept: PHYSICAL THERAPY | Age: 69
Discharge: HOME | End: 2019-04-09
Payer: MEDICARE

## 2019-04-09 PROCEDURE — 97110 THERAPEUTIC EXERCISES: CPT

## 2019-04-09 PROCEDURE — 97140 MANUAL THERAPY 1/> REGIONS: CPT

## 2019-04-09 NOTE — PROGRESS NOTES
Tc Porter  : 1950  [place]  [place]: [place] [address] [place] at [address] [place] [address]  [phone]   [phone]        OUTPATIENT PHYSICAL THERAPY:Daily Note and Recertification    ICD-10: Treatment Diagnosis: Cervicalgia [M54.2]; Headache [R51]  Precautions/Allergies:   Patient has no known allergies. MD Orders: Evaluate and Treat MEDICAL/REFERRING DIAGNOSIS:  Neck pain [M54.2]   DATE OF ONSET: 2018  REFERRING PHYSICIAN: Kizzy Worthy MD  RETURN PHYSICIAN APPOINTMENT: TBD     INITIAL ASSESSMENT:  Mr. Michael Méndez presents with signs of R sided neck pain. Mobility restrictions are present in the upper cervical spine bilaterally with pain present during right sided cervical rotation. Pt. Demonstrates moderate myofascial pain additionally in the R splenius capitis, R sternocleidomastoid, and R scalene musculature. Pt. Will benefit from skilled PT including manual therapy, range of motion, and mobility exercises to address impairments identified. 18 Progress Report: Pt. Attends 10 physical therapy visits. Pt. Demonstrates improved cervical ROM in all directions. Despite improved cervical ROM, pain remains in the R posterior cervical region and R Sternocleidomastoid with cervical rotations movements bilaterally. Upper cervical joint mobility has improved but patient remains positive with the upper cervical flexion rotation test.  Pt. Will benefit from continued manual therapy and deep neck flexor strengthening to address remaining pain. 19 Progress Report: Improvements in cervical rotation and extension remain, but patient continues to experience pain during cervical rotation. PT suspects mild cervical dystonia symptoms resulting in antagonistic muscle contraction with cervical rotation.   Joint limitations remain in the upper cervical spine resulting in compensated lateral flexion during R sided cervical rotation. Pt. Babara Hamman to benefit from physical therapy but may benefit from further alternative treatment additionally. 3/26/19 Progress Report: Re-assessment today is performed during an acute flare up of symptoms [facility] Pt. Was making good progress toward pain free cervical ROM and advancement of cervical and scapular strengthening exercises. Due to flare up, subjective pain levels and functional outcome measures are worse at this time. Pt. Overall has improved since starting PT but again is encouraged to f/u with referring M.D. For further alternative treatment. Pt. Will benefit from continued PT to address pain and ADL deficits. PROBLEM LIST (Impacting functional limitations):  1. Decreased Strength  2. Increased Pain  3. Decreased Activity Tolerance  4. Decreased Flexibility/Joint Mobility  5. Decreased [place] with Home Exercise Program INTERVENTIONS PLANNED:  1. Home Exercise Program (HEP)  2. Manual Therapy  3. Range of Motion (ROM)  4. Therapeutic Exercise/Strengthening   TREATMENT PLAN:  Effective Dates: 04/09/2019 TO 06/08/2019  (60 days). Frequency/Duration: 1-2 times a week for 60 Day(s)  GOALS: (Goals have been discussed and agreed upon with patient.)  Discharge Goals: Time Frame: 6 weeks  1. Pt. Will demonstrate 60 degrees of cervical rotation bilaterally to improve driving safety. MET  2. Pt. Will demonstrate <3/10 R sided neck pain with 8 hours of sleep to improve pain. NOT MET  3. Pt. Will score < 12% on the NDI to demonstrate improved pain and function. NOT MET  Rehabilitation Potential For Stated Goals: Good              The information in this section was collected on 11/9/18 (except where otherwise noted). HISTORY:   History of Present Injury/Illness (Reason for Referral):  Pt. Attends PT c/o R sided neck pain with gradual insidious onset about a year ago.   The pain is located along the R side of the neck and aggravated with turning the head, sleeping at night, and performing daily activities. Pt. Denies injury or numbness tingling, difficulty speaking, tinnitus, or difficulty swallowing. Pt. Notes only treatment so far was massage that helped for a few hours. Pt. Would like to address pain and return to previous level of function. Past Medical History/Comorbidities:   Mr. Hollie Hernandez  has a past medical history of Bradycardia, HLD (hyperlipidemia), Osteoarthritis, and Personal history of colonic polyps (2014). He also has no past medical history of Aneurysm ([address] Coagulation disorder ([address] Difficult intubation, Heart failure ([address] Ill-defined condition, Malignant hyperthermia due to anesthesia, Morbid obesity ([address] Nausea & vomiting, Pseudocholinesterase deficiency, Psychiatric disorder, Unspecified adverse effect of anesthesia, or Unspecified sleep apnea. Mr. Hollie Hernandez  has a past surgical history that includes hx gi (2007); hx colonoscopy (9/10/14); and pr abdomen surgery proc unlisted. Social History/Living Environment:     Lives with family in 2 story home  Prior Level of Function/Work/Activity:  Functioned independently without limitations. Ambulatory/Rehab Services H2 Model Falls Risk Assessment    Risk Factors:       (1)  Gender [Male] Ability to Rise from Chair:       (0)  Ability to rise in a single movement    Falls Prevention Plan:       No modifications necessary   Total: (5 or greater = High Risk): 1    ©2010 [facility] of [employer]. All Rights Reserved. [facility] Patent #[id number]. Federal Law prohibits the replication, distribution or use without written permission from [facility] [employer]     Current Medications:       Current Outpatient Medications:     rosuvastatin (CRESTOR) 10 mg tablet, TAKE 1 TABLET BY MOUTH DAILY, Disp: 90 Tab, Rfl: 1    multivitamin (ONE A DAY) tablet, Take 1 tablet by mouth daily. , Disp: , Rfl:     ibuprofen (MOTRIN) 200 mg tablet, Take 200 mg by mouth every six (6) hours as needed for Pain. Hold till after surgery   Indications: PAIN, Disp: , Rfl:     cyanocobalamin (VITAMIN B12) 500 mcg tablet, Take 500 mcg by mouth daily. , Disp: , Rfl:    Date Last Reviewed:  4/9/2019   Number of Personal Factors/Comorbidities that affect the Plan of Care: 0: LOW COMPLEXITY   EXAMINATION:   Observation:       Palpation:  Upper Trapezius:-- Levator Scapulae:-- Posterior Cervical: tender on R side   Sternocleidomastoid: Tender on R side Scalenes: Tender on R side Other:       Flexibility: Limited suboccipitals, SCM, scalenes bilaterally      Range of Motion: Cervical in degrees  Flexion: 50                                       Extesnion: 41                       Right                     Left   Rotation 61 pain 62    Side Bend 20 16       Strength: Manual Muscle Test: upper quarter screen 5/5 MMT bilaterally. Cervical Flexion Endurance Test: --    Special Testing:  Spurlings Right: Left:   Upper limb Tension Test (ULTT1) -- --   Cervical Distraction -- --   Cervical flexion rotation test (+) (+)   Other:       Neuro: reflexes 2+ bilateral UE   Body Structures Involved:  1. Joints  2. Muscles Body Functions Affected:  1. Neuromusculoskeletal  2. Movement Related Activities and Participation Affected:  1. Mobility  2. Self Care   Number of elements (examined above) that affect the Plan of Care: 1-2: LOW COMPLEXITY   CLINICAL PRESENTATION:   Presentation: Stable and uncomplicated: LOW COMPLEXITY   CLINICAL DECISION MAKING:   Outcome Measure: Tool Used: Neck Disability Index (NDI)  Score:  Initial: 11/50  Most Recent: 23/50 (Date: 03/26/19 )   Interpretation of Score: The Neck Disability Index is a revised form of the Oswestry Low Back Pain Index and is designed to measure the activities of daily living in person's with neck pain. Each section is scored on a 0-5 scale, 5 representing the greatest disability.   The scores of each section are added together for a total score of 50. Medical Necessity:   · Patient is expected to demonstrate progress in strength and range of motion to increase independence with sleeping, driving, and ADL's. Reason for Services/Other Comments:  · Patient will benefit from skilled PT to address impairments identified through evaluation and return to previous ADL and recreational capacity. Use of outcome tool(s) and clinical judgement create a POC that gives a: Clear prediction of patient's progress: LOW COMPLEXITY            TREATMENT:   (In addition to Assessment/Re-Assessment sessions the following treatments were rendered)  Pre-treatment Symptoms/Complaints:  Pt. Notes recent flare up of neck pain improved over the past week while on vacation. Pt. Is to f/u with Dr. Melisa De La Paz tomorrow about ongoing neck pain. Pain: Initial:   Pain Intensity 1: 4 /10 Post Session:  4/10     Therapeutic Exercise: (20 Minutes):  Exercises per grid below to improve mobility and strength. Required moderate verbal and manual cues to promote proper body alignment and promote proper body posture. Progressed range as indicated.      Date:  4/9/2019   Activity/Exercise Parameters   Cervical rotation 2 minutes   Chin tucks (central and in slight rotation bilaterally) --   Upper trapezius stretch/scalene 3 minutes   Rows (green band) --   Foam roll (thoracic extension, chest stretch) --   Isometric cervical extensions (green band) --   Bird dog 3 x 10   Cervical rotation SNAGS --   Curl ups with chin tuck on swiss ball 3 x 15   T's on swiss ball with chin tuck 3 x 15   Chin tuck head lift 3 x 10   Cervical Proprioception (laser) --   Modified push up with chin tuck --   Thoracic rotation with cervical rotation in sidelying --   PNF D2 in half kneeling 3 x10   Chest Press on Therapy ball with chin tuck (10#) --       Manual Therapy (    Soft Tissue Mobilization Duration  Duration: 30 Minutes): Manual techniques to facilitate improved motion and decreased pain. (Used abbreviations: MET - muscle energy technique; PNF - proprioceptive neuromuscular facilitation; NMR - neuromuscular re-education; a/p - anterior to posterior; p/a - posterior to anterior)   · Soft tissue mobilization: R splenius capitis, sternocleidomastoid, scalenes  · Joint mobilization: C1/2 in prone unilateral p/a on the R side. Grade III-IV; C0/C1 flexion grade III  · Joint mobilization: Grade V C1/2 rotation in supine to the R side. · Manual cervical traction  · Joint mobilization: grade V C7 manipulation in prone (NOT PERFORMED)  · Soft tissue mobilization: instrument assisted: B cervical multifidi C2-5, R splenius capitis. [employer] Portal  Treatment/Session Assessment:    · Response to Treatment: Cervical rotation ROM is improved Bilaterally today but pain remains with L sided cervical rotation. Manual therapy is tolerated without complaints and minimal post treatment soreness. · Compliance with Program/Exercises: Compliant all of the time. · Recommendations/Intent for next treatment session: \"Next visit will focus on manual therapy and strengthening exercises. \".   Total Treatment Duration: 50 minutes total time  PT Patient Time In/Time Out  Time In: 1330  Time Out: [address] [facility]

## 2019-04-16 ENCOUNTER — APPOINTMENT (OUTPATIENT)
Dept: PHYSICAL THERAPY | Age: 69
End: 2019-04-16
Payer: MEDICARE

## 2019-04-18 ENCOUNTER — APPOINTMENT (OUTPATIENT)
Dept: PHYSICAL THERAPY | Age: 69
End: 2019-04-18
Payer: MEDICARE

## 2019-04-23 ENCOUNTER — APPOINTMENT (OUTPATIENT)
Dept: PHYSICAL THERAPY | Age: 69
End: 2019-04-23
Payer: MEDICARE

## 2019-04-25 ENCOUNTER — APPOINTMENT (OUTPATIENT)
Dept: PHYSICAL THERAPY | Age: 69
End: 2019-04-25
Payer: MEDICARE

## 2019-04-30 ENCOUNTER — HOSPITAL ENCOUNTER (OUTPATIENT)
Dept: PHYSICAL THERAPY | Age: 69
Discharge: HOME | End: 2019-04-30
Payer: MEDICARE

## 2019-04-30 PROCEDURE — 97110 THERAPEUTIC EXERCISES: CPT

## 2019-04-30 PROCEDURE — 97140 MANUAL THERAPY 1/> REGIONS: CPT

## 2019-04-30 NOTE — PROGRESS NOTES
Skylar Dickerson  : 1950  [place]  [place]: [place] [address] at [address] [place] [address]  [phone]   [phone]        OUTPATIENT PHYSICAL THERAPY:Daily Note 2019   ICD-10: Treatment Diagnosis: Cervicalgia [M54.2]; Headache [R51]  Precautions/Allergies:   Patient has no known allergies. MD Orders: Evaluate and Treat MEDICAL/REFERRING DIAGNOSIS:  Neck pain [M54.2]   DATE OF ONSET: 2018  REFERRING PHYSICIAN: Kateryna Abad MD  RETURN PHYSICIAN APPOINTMENT: TBD     INITIAL ASSESSMENT:  Mr. Eugenie Haskins presents with signs of R sided neck pain. Mobility restrictions are present in the upper cervical spine bilaterally with pain present during right sided cervical rotation. Pt. Demonstrates moderate myofascial pain additionally in the R splenius capitis, R sternocleidomastoid, and R scalene musculature. Pt. Will benefit from skilled PT including manual therapy, range of motion, and mobility exercises to address impairments identified. 18 Progress Report: Pt. Attends 10 physical therapy visits. Pt. Demonstrates improved cervical ROM in all directions. Despite improved cervical ROM, pain remains in the R posterior cervical region and R Sternocleidomastoid with cervical rotations movements bilaterally. Upper cervical joint mobility has improved but patient remains positive with the upper cervical flexion rotation test.  Pt. Will benefit from continued manual therapy and deep neck flexor strengthening to address remaining pain. 19 Progress Report: Improvements in cervical rotation and extension remain, but patient continues to experience pain during cervical rotation. PT suspects mild cervical dystonia symptoms resulting in antagonistic muscle contraction with cervical rotation.   Joint limitations remain in the upper cervical spine resulting in compensated lateral flexion during R sided cervical rotation. Pt. Babara Hamman to benefit from physical therapy but may benefit from further alternative treatment additionally. 3/26/19 Progress Report: Re-assessment today is performed during an acute flare up of symptoms following a trip [place]. Pt. Was making good progress toward pain free cervical ROM and advancement of cervical and scapular strengthening exercises. Due to flare up, subjective pain levels and functional outcome measures are worse at this time. Pt. Overall has improved since starting PT but again is encouraged to f/u with referring M.D. For further alternative treatment. Pt. Will benefit from continued PT to address pain and ADL deficits. PROBLEM LIST (Impacting functional limitations):  1. Decreased Strength  2. Increased Pain  3. Decreased Activity Tolerance  4. Decreased Flexibility/Joint Mobility  5. Decreased [place] with Home Exercise Program INTERVENTIONS PLANNED:  1. Home Exercise Program (HEP)  2. Manual Therapy  3. Range of Motion (ROM)  4. Therapeutic Exercise/Strengthening   TREATMENT PLAN:  Effective Dates: 04/09/2019 TO 06/08/2019  (60 days). Frequency/Duration: 1-2 times a week for 60 Day(s)  GOALS: (Goals have been discussed and agreed upon with patient.)  Discharge Goals: Time Frame: 6 weeks  1. Pt. Will demonstrate 60 degrees of cervical rotation bilaterally to improve driving safety. MET  2. Pt. Will demonstrate <3/10 R sided neck pain with 8 hours of sleep to improve pain. NOT MET  3. Pt. Will score < 12% on the NDI to demonstrate improved pain and function. NOT MET  Rehabilitation Potential For Stated Goals: Good              The information in this section was collected on 11/9/18 (except where otherwise noted). HISTORY:   History of Present Injury/Illness (Reason for Referral):  Pt. Attends PT c/o R sided neck pain with gradual insidious onset about a year ago.   The pain is located along the R side of the neck and aggravated with turning the head, sleeping at night, and performing daily activities. Pt. Denies injury or numbness tingling, difficulty speaking, tinnitus, or difficulty swallowing. Pt. Notes only treatment so far was massage that helped for a few hours. Pt. Would like to address pain and return to previous level of function. Past Medical History/Comorbidities:   Mr. Liliam Sebastian  has a past medical history of Bradycardia, HLD (hyperlipidemia), Osteoarthritis, and Personal history of colonic polyps (2014). He also has no past medical history of Aneurysm ([address] Coagulation disorder ([address] Difficult intubation, Heart failure ([address] Ill-defined condition, Malignant hyperthermia due to anesthesia, Morbid obesity ([address] Nausea & vomiting, Pseudocholinesterase deficiency, Psychiatric disorder, Unspecified adverse effect of anesthesia, or Unspecified sleep apnea. Mr. Liliam Sebastian  has a past surgical history that includes hx gi (2007); hx colonoscopy (9/10/14); and pr abdomen surgery proc unlisted. Social History/Living Environment:     Lives with family in 2 story home  Prior Level of Function/Work/Activity:  Functioned independently without limitations. Ambulatory/Rehab Services H2 Model Falls Risk Assessment    Risk Factors:       (1)  Gender [Male] Ability to Rise from Chair:       (0)  Ability to rise in a single movement    Falls Prevention Plan:       No modifications necessary   Total: (5 or greater = High Risk): 1    ©2010 [facility] [employer]. All Rights Reserved. [facility] Patent #[id number]. Federal Law prohibits the replication, distribution or use without written permission from [facility] [employer]     Current Medications:       Current Outpatient Medications:     multivitamin (ONE A DAY) tablet, Take 1 tablet by mouth daily. , Disp: , Rfl:     ibuprofen (MOTRIN) 200 mg tablet, Take 200 mg by mouth every six (6) hours as needed for Pain.  Hold till after surgery   Indications: PAIN, Disp: , Rfl:     cyanocobalamin (VITAMIN B12) 500 mcg tablet, Take 500 mcg by mouth daily. , Disp: , Rfl:    Date Last Reviewed:  4/30/2019   Number of Personal Factors/Comorbidities that affect the Plan of Care: 0: LOW COMPLEXITY   EXAMINATION:   Observation:       Palpation:  Upper Trapezius:-- Levator Scapulae:-- Posterior Cervical: tender on R side   Sternocleidomastoid: Tender on R side Scalenes: Tender on R side Other:       Flexibility: Limited suboccipitals, SCM, scalenes bilaterally      Range of Motion: Cervical in degrees  Flexion: 50                                       Extesnion: 41                       Right                     Left   Rotation 61 pain 62    Side Bend 20 16       Strength: Manual Muscle Test: upper quarter screen 5/5 MMT bilaterally. Cervical Flexion Endurance Test: --    Special Testing:  Spurlings Right: Left:   Upper limb Tension Test (ULTT1) -- --   Cervical Distraction -- --   Cervical flexion rotation test (+) (+)   Other:       Neuro: reflexes 2+ bilateral UE   Body Structures Involved:  1. Joints  2. Muscles Body Functions Affected:  1. Neuromusculoskeletal  2. Movement Related Activities and Participation Affected:  1. Mobility  2. Self Care   Number of elements (examined above) that affect the Plan of Care: 1-2: LOW COMPLEXITY   CLINICAL PRESENTATION:   Presentation: Stable and uncomplicated: LOW COMPLEXITY   CLINICAL DECISION MAKING:   Outcome Measure: Tool Used: Neck Disability Index (NDI)  Score:  Initial: 11/50  Most Recent: 23/50 (Date: 03/26/19 )   Interpretation of Score: The Neck Disability Index is a revised form of the Oswestry Low Back Pain Index and is designed to measure the activities of daily living in person's with neck pain. Each section is scored on a 0-5 scale, 5 representing the greatest disability. The scores of each section are added together for a total score of 50.        Medical Necessity:   · Patient is expected to demonstrate progress in strength and range of motion to increase independence with sleeping, driving, and ADL's. Reason for Services/Other Comments:  · Patient will benefit from skilled PT to address impairments identified through evaluation and return to previous ADL and recreational capacity. Use of outcome tool(s) and clinical judgement create a POC that gives a: Clear prediction of patient's progress: LOW COMPLEXITY            TREATMENT:   (In addition to Assessment/Re-Assessment sessions the following treatments were rendered)  Pre-treatment Symptoms/Complaints:  Pt. Reports improved neck pain over the past 2 weeks while on vacation. Pt. Notes f/u with referring physician whom provided option of a cervical injection. Pt. Prefers to continue PT and f/u in July for potential cervical injection if pain fails to continue to improve. Pain: Initial:   Pain Intensity 1: 4 /10 Post Session:  4/10     Therapeutic Exercise: (20 Minutes):  Exercises per grid below to improve mobility and strength. Required moderate verbal and manual cues to promote proper body alignment and promote proper body posture. Progressed range as indicated. Date:  4/30/2019   Activity/Exercise Parameters   Cervical rotation --    --   Upper trapezius stretch/scalene 3 minutes   Rows (green band) --   Foam roll (thoracic extension, chest stretch) --   Isometric cervical extensions (green band) --   Bird dog 3 x 10   Cervical rotation SNAGS --   Curl ups with chin tuck on swiss ball 3 x 15   T's on swiss ball with chin tuck 3 x 15   Chin tuck head lift 3 x 10   Cervical Proprioception (laser) --   Modified push up with chin tuck 3 x 10   Thoracic rotation with cervical rotation in sidelying --   PNF D2 in half kneeling --   Chest Press on Therapy ball with chin tuck (10#) --       Manual Therapy (    Soft Tissue Mobilization Duration  Duration: 30 Minutes): Manual techniques to facilitate improved motion and decreased pain.  (Used abbreviations: MET - muscle energy technique; PNF - proprioceptive neuromuscular facilitation; NMR - neuromuscular re-education; a/p - anterior to posterior; p/a - posterior to anterior)   · Soft tissue mobilization: R splenius capitis, sternocleidomastoid, scalenes  · Joint mobilization: C1/2 in prone unilateral p/a on the R side. Grade III-IV; C0/C1 flexion grade III  · Joint mobilization: Grade V C1/2 rotation in supine to the R side. (NOT PERFORMED)   · Manual cervical traction  · Joint mobilization: grade V C7 manipulation in prone (NOT PERFORMED)  · Soft tissue mobilization: instrument assisted: B cervical multifidi C2-5, R splenius capitis. [employer] Portal  Treatment/Session Assessment:    · Response to Treatment: Pt. Demonstrates improved tenderness to palpation of the posterior cervical musculature today. Pt. Will likely decreased to frequency of 1x per week with increased emphasis on cervical and scapular strengthening exercises as part of HEP. Pt. Will benefit from continued manual therapy for pain relief. · Compliance with Program/Exercises: Compliant all of the time. · Recommendations/Intent for next treatment session: \"Next visit will focus on manual therapy and strengthening exercises. \".   Total Treatment Duration: 50 minutes total time  PT Patient Time In/Time Out  Time In: 1430  Time Out: [address]

## 2019-05-02 ENCOUNTER — HOSPITAL ENCOUNTER (OUTPATIENT)
Dept: PHYSICAL THERAPY | Age: 69
Discharge: HOME | End: 2019-05-02
Payer: MEDICARE

## 2019-05-02 PROCEDURE — 97140 MANUAL THERAPY 1/> REGIONS: CPT

## 2019-05-02 PROCEDURE — 97110 THERAPEUTIC EXERCISES: CPT

## 2019-05-02 NOTE — PROGRESS NOTES
Reema Gilmore  : 1950  [place]  [place] [facility] M-[address] at [address]  [phone]   [phone]        OUTPATIENT PHYSICAL THERAPY:Daily Note 2019   ICD-10: Treatment Diagnosis: Cervicalgia [M54.2]; Headache [R51]  Precautions/Allergies:   Patient has no known allergies. MD Orders: Evaluate and Treat MEDICAL/REFERRING DIAGNOSIS:  Neck pain [M54.2]   DATE OF ONSET: 2018  REFERRING PHYSICIAN: Leona Barker MD  RETURN PHYSICIAN APPOINTMENT: TBD     INITIAL ASSESSMENT:  Mr. Del Rio Rosamond presents with signs of R sided neck pain. Mobility restrictions are present in the upper cervical spine bilaterally with pain present during right sided cervical rotation. Pt. Demonstrates moderate myofascial pain additionally in the R splenius capitis, R sternocleidomastoid, and R scalene musculature. Pt. Will benefit from skilled PT including manual therapy, range of motion, and mobility exercises to address impairments identified. 18 Progress Report: Pt. Attends 10 physical therapy visits. Pt. Demonstrates improved cervical ROM in all directions. Despite improved cervical ROM, pain remains in the R posterior cervical region and R Sternocleidomastoid with cervical rotations movements bilaterally. Upper cervical joint mobility has improved but patient remains positive with the upper cervical flexion rotation test.  Pt. Will benefit from continued manual therapy and deep neck flexor strengthening to address remaining pain. 19 Progress Report: Improvements in cervical rotation and extension remain, but patient continues to experience pain during cervical rotation. PT suspects mild cervical dystonia symptoms resulting in antagonistic muscle contraction with cervical rotation.   Joint limitations remain in the upper cervical spine resulting in compensated lateral flexion during R sided cervical rotation. Pt. Alberta Care to benefit from physical therapy but may benefit from further alternative treatment additionally. 3/26/19 Progress Report: Re-assessment today is performed during an acute flare up of symptoms following a trip to [place]. Pt. Was making good progress toward pain free cervical ROM and advancement of cervical and scapular strengthening exercises. Due to flare up, subjective pain levels and functional outcome measures are worse at this time. Pt. Overall has improved since starting PT but again is encouraged to f/u with referring M.D. For further alternative treatment. Pt. Will benefit from continued PT to address pain and ADL deficits. PROBLEM LIST (Impacting functional limitations):  1. Decreased Strength  2. Increased Pain  3. Decreased Activity Tolerance  4. Decreased Flexibility/Joint Mobility  5. Decreased [place] with Home Exercise Program INTERVENTIONS PLANNED:  1. Home Exercise Program (HEP)  2. Manual Therapy  3. Range of Motion (ROM)  4. Therapeutic Exercise/Strengthening   TREATMENT PLAN:  Effective Dates: 04/09/2019 TO 06/08/2019  (60 days). Frequency/Duration: 1-2 times a week for 60 Day(s)  GOALS: (Goals have been discussed and agreed upon with patient.)  Discharge Goals: Time Frame: 6 weeks  1. Pt. Will demonstrate 60 degrees of cervical rotation bilaterally to improve driving safety. MET  2. Pt. Will demonstrate <3/10 R sided neck pain with 8 hours of sleep to improve pain. NOT MET  3. Pt. Will score < 12% on the NDI to demonstrate improved pain and function. NOT MET  Rehabilitation Potential For Stated Goals: Good              The information in this section was collected on 11/9/18 (except where otherwise noted). HISTORY:   History of Present Injury/Illness (Reason for Referral):  Pt. Attends PT c/o R sided neck pain with gradual insidious onset about a year ago.   The pain is located along the R side of the neck and aggravated with turning the head, sleeping at night, and performing daily activities. Pt. Denies injury or numbness tingling, difficulty speaking, tinnitus, or difficulty swallowing. Pt. Notes only treatment so far was massage that helped for a few hours. Pt. Would like to address pain and return to previous level of function. Past Medical History/Comorbidities:   Mr. Cordell Stanley  has a past medical history of Bradycardia, HLD (hyperlipidemia), Osteoarthritis, and Personal history of colonic polyps (2014). He also has no past medical history of Aneurysm ([address] Coagulation disorder ([address] Difficult intubation, Heart failure ([address] Ill-defined condition, Malignant hyperthermia due to anesthesia, Morbid obesity ([address] Nausea & vomiting, Pseudocholinesterase deficiency, Psychiatric disorder, Unspecified adverse effect of anesthesia, or Unspecified sleep apnea. Mr. Cordell Stanley  has a past surgical history that includes hx gi (2007); hx colonoscopy (9/10/14); and pr abdomen surgery proc unlisted. Social History/Living Environment:     Lives with family in 2 story home  Prior Level of Function/Work/Activity:  Functioned independently without limitations. Ambulatory/Rehab Services H2 Model Falls Risk Assessment    Risk Factors:       (1)  Gender [Male] Ability to Rise from Chair:       (0)  Ability to rise in a single movement    Falls Prevention Plan:       No modifications necessary   Total: (5 or greater = High Risk): 1    ©2010 [facility] of [employer]. All Rights Reserved. [facility] Patent #[id number]. Federal Law prohibits the replication, distribution or use without written permission from [facility] [employer]     Current Medications:       Current Outpatient Medications:     multivitamin (ONE A DAY) tablet, Take 1 tablet by mouth daily. , Disp: , Rfl:     ibuprofen (MOTRIN) 200 mg tablet, Take 200 mg by mouth every six (6) hours as needed for Pain.  Hold till after surgery   Indications: PAIN, Disp: , Rfl:     cyanocobalamin (VITAMIN B12) 500 mcg tablet, Take 500 mcg by mouth daily. , Disp: , Rfl:    Date Last Reviewed:  5/2/2019   Number of Personal Factors/Comorbidities that affect the Plan of Care: 0: LOW COMPLEXITY   EXAMINATION:   Observation:       Palpation:  Upper Trapezius:-- Levator Scapulae:-- Posterior Cervical: tender on R side   Sternocleidomastoid: Tender on R side Scalenes: Tender on R side Other:       Flexibility: Limited suboccipitals, SCM, scalenes bilaterally      Range of Motion: Cervical in degrees  Flexion: 50                                       Extesnion: 41                       Right                     Left   Rotation 61 pain 62    Side Bend 20 16       Strength: Manual Muscle Test: upper quarter screen 5/5 MMT bilaterally. Cervical Flexion Endurance Test: --    Special Testing:  Spurlings Right: Left:   Upper limb Tension Test (ULTT1) -- --   Cervical Distraction -- --   Cervical flexion rotation test (+) (+)   Other:       Neuro: reflexes 2+ bilateral UE   Body Structures Involved:  1. Joints  2. Muscles Body Functions Affected:  1. Neuromusculoskeletal  2. Movement Related Activities and Participation Affected:  1. Mobility  2. Self Care   Number of elements (examined above) that affect the Plan of Care: 1-2: LOW COMPLEXITY   CLINICAL PRESENTATION:   Presentation: Stable and uncomplicated: LOW COMPLEXITY   CLINICAL DECISION MAKING:   Outcome Measure: Tool Used: Neck Disability Index (NDI)  Score:  Initial: 11/50  Most Recent: 23/50 (Date: 03/26/19 )   Interpretation of Score: The Neck Disability Index is a revised form of the Oswestry Low Back Pain Index and is designed to measure the activities of daily living in person's with neck pain. Each section is scored on a 0-5 scale, 5 representing the greatest disability. The scores of each section are added together for a total score of 50.        Medical Necessity:   · Patient is expected to demonstrate progress in strength and range of motion to increase independence with sleeping, driving, and ADL's. Reason for Services/Other Comments:  · Patient will benefit from skilled PT to address impairments identified through evaluation and return to previous ADL and recreational capacity. Use of outcome tool(s) and clinical judgement create a POC that gives a: Clear prediction of patient's progress: LOW COMPLEXITY            TREATMENT:   (In addition to Assessment/Re-Assessment sessions the following treatments were rendered)  Pre-treatment Symptoms/Complaints:  Pt. Denies new symptoms this week. Pain: Initial:   Pain Intensity 1: 4 /10 Post Session:  4/10     Therapeutic Exercise: (25 Minutes):  Exercises per grid below to improve mobility and strength. Required moderate verbal and manual cues to promote proper body alignment and promote proper body posture. Progressed range as indicated. Date:  5/2/2019   Activity/Exercise Parameters   Cervical rotation --    --   Upper trapezius stretch/scalene 3 minutes   Rows (green band) --   Foam roll (thoracic extension, chest stretch) --   Isometric cervical extensions (green band) --   Bird dog --   Cervical rotation SNAGS --   Curl ups with chin tuck on swiss ball 3 x 15   T's on swiss ball with chin tuck 3 x 15   Chin tuck head lift 3 x 10   Cervical Proprioception (laser) --   Modified push up with chin tuck --   Thoracic rotation with cervical rotation in sidelying --   PNF D2 in half kneeling 3 x 10   Chest Press on Therapy ball with chin tuck (10#) 3 x 10       Manual Therapy (    Soft Tissue Mobilization Duration  Duration: 20 Minutes): Manual techniques to facilitate improved motion and decreased pain.  (Used abbreviations: MET - muscle energy technique; PNF - proprioceptive neuromuscular facilitation; NMR - neuromuscular re-education; a/p - anterior to posterior; p/a - posterior to anterior)   · Soft tissue mobilization: R splenius capitis, sternocleidomastoid, scalenes  · Joint mobilization: C1/2 in prone unilateral p/a on the R side. Grade III-IV; C0/C1 flexion grade III  · Joint mobilization: Grade V C1/2 rotation in supine to the R side. · Manual cervical traction  · Joint mobilization: grade V C7 manipulation in prone (NOT PERFORMED)  · Soft tissue mobilization: instrument assisted: R sternocleidomastoid, R splenius capitis. MedBridge Portal  Treatment/Session Assessment:    · Response to Treatment: Pt. Demonstrates improved cervical mobility with central and unilateral p/a to cervical spine this week. Pt. Is less tender to palpation of global cervical musculature additionally. · Compliance with Program/Exercises: Compliant all of the time. · Recommendations/Intent for next treatment session: \"Next visit will focus on manual therapy and strengthening exercises. \".   Total Treatment Duration: 45 minutes total time  PT Patient Time In/Time Out  Time In: 1330  Time Out: [address]

## 2019-05-07 ENCOUNTER — HOSPITAL ENCOUNTER (OUTPATIENT)
Dept: PHYSICAL THERAPY | Age: 69
Discharge: HOME | End: 2019-05-07
Payer: MEDICARE

## 2019-05-07 PROCEDURE — 97110 THERAPEUTIC EXERCISES: CPT

## 2019-05-07 PROCEDURE — 97140 MANUAL THERAPY 1/> REGIONS: CPT

## 2019-05-07 NOTE — PROGRESS NOTES
Yamilex Jones  : 1950  [place]  [place]: [place] [address] at [place] [address], [address] [facility]  [phone]   [phone]        OUTPATIENT PHYSICAL THERAPY:Daily Note 2019   ICD-10: Treatment Diagnosis: Cervicalgia [M54.2]; Headache [R51]  Precautions/Allergies:   Patient has no known allergies. MD Orders: Evaluate and Treat MEDICAL/REFERRING DIAGNOSIS:  Neck pain [M54.2]   DATE OF ONSET: 2018  REFERRING PHYSICIAN: Jenna Rasmussen MD  RETURN PHYSICIAN APPOINTMENT: TBD     INITIAL ASSESSMENT:  Mr. Cordell Stanley presents with signs of R sided neck pain. Mobility restrictions are present in the upper cervical spine bilaterally with pain present during right sided cervical rotation. Pt. Demonstrates moderate myofascial pain additionally in the R splenius capitis, R sternocleidomastoid, and R scalene musculature. Pt. Will benefit from skilled PT including manual therapy, range of motion, and mobility exercises to address impairments identified. 18 Progress Report: Pt. Attends 10 physical therapy visits. Pt. Demonstrates improved cervical ROM in all directions. Despite improved cervical ROM, pain remains in the R posterior cervical region and R Sternocleidomastoid with cervical rotations movements bilaterally. Upper cervical joint mobility has improved but patient remains positive with the upper cervical flexion rotation test.  Pt. Will benefit from continued manual therapy and deep neck flexor strengthening to address remaining pain. 19 Progress Report: Improvements in cervical rotation and extension remain, but patient continues to experience pain during cervical rotation. PT suspects mild cervical dystonia symptoms resulting in antagonistic muscle contraction with cervical rotation.   Joint limitations remain in the upper cervical spine resulting in compensated lateral flexion during R sided cervical rotation. Pt. Elena Shape to benefit from physical therapy but may benefit from further alternative treatment additionally. 3/26/19 Progress Report: Re-assessment today is performed during an acute flare up of symptoms following a trip to [place]. Pt. Was making good progress toward pain free cervical ROM and advancement of cervical and scapular strengthening exercises. Due to flare up, subjective pain levels and functional outcome measures are worse at this time. Pt. Overall has improved since starting PT but again is encouraged to f/u with referring M.D. For further alternative treatment. Pt. Will benefit from continued PT to address pain and ADL deficits. PROBLEM LIST (Impacting functional limitations):  1. Decreased Strength  2. Increased Pain  3. Decreased Activity Tolerance  4. Decreased Flexibility/Joint Mobility  5. Decreased [place] with Home Exercise Program INTERVENTIONS PLANNED:  1. Home Exercise Program (HEP)  2. Manual Therapy  3. Range of Motion (ROM)  4. Therapeutic Exercise/Strengthening   TREATMENT PLAN:  Effective Dates: 04/09/2019 TO 06/08/2019  (60 days). Frequency/Duration: 1-2 times a week for 60 Day(s)  GOALS: (Goals have been discussed and agreed upon with patient.)  Discharge Goals: Time Frame: 6 weeks  1. Pt. Will demonstrate 60 degrees of cervical rotation bilaterally to improve driving safety. MET  2. Pt. Will demonstrate <3/10 R sided neck pain with 8 hours of sleep to improve pain. NOT MET  3. Pt. Will score < 12% on the NDI to demonstrate improved pain and function. NOT MET  Rehabilitation Potential For Stated Goals: Good              The information in this section was collected on 11/9/18 (except where otherwise noted). HISTORY:   History of Present Injury/Illness (Reason for Referral):  Pt. Attends PT c/o R sided neck pain with gradual insidious onset about a year ago.   The pain is located along the R side of the neck and aggravated with turning the head, sleeping at night, and performing daily activities. Pt. Denies injury or numbness tingling, difficulty speaking, tinnitus, or difficulty swallowing. Pt. Notes only treatment so far was massage that helped for a few hours. Pt. Would like to address pain and return to previous level of function. Past Medical History/Comorbidities:   Mr. Lyssa Brown  has a past medical history of Bradycardia, HLD (hyperlipidemia), Osteoarthritis, and Personal history of colonic polyps (2014). He also has no past medical history of Aneurysm ([address] Coagulation disorder ([address] Difficult intubation, Heart failure ([address] Ill-defined condition, Malignant hyperthermia due to anesthesia, Morbid obesity ([address] Nausea & vomiting, Pseudocholinesterase deficiency, Psychiatric disorder, Unspecified adverse effect of anesthesia, or Unspecified sleep apnea. Mr. Lyssa Brown  has a past surgical history that includes hx gi (2007); hx colonoscopy (9/10/14); and pr abdomen surgery proc unlisted. Social History/Living Environment:     Lives with family in 2 story home  Prior Level of Function/Work/Activity:  Functioned independently without limitations. Ambulatory/Rehab Services H2 Model Falls Risk Assessment    Risk Factors:       (1)  Gender [Male] Ability to Rise from Chair:       (0)  Ability to rise in a single movement    Falls Prevention Plan:       No modifications necessary   Total: (5 or greater = High Risk): 1    ©2010 [facility] of [employer]. All Rights Reserved. [facility] Patent #[id number]. Federal Law prohibits the replication, distribution or use without written permission from [facility] [employer]     Current Medications:       Current Outpatient Medications:     multivitamin (ONE A DAY) tablet, Take 1 tablet by mouth daily. , Disp: , Rfl:     ibuprofen (MOTRIN) 200 mg tablet, Take 200 mg by mouth every six (6) hours as needed for Pain.  Hold till after surgery   Indications: PAIN, Disp: , Rfl:     cyanocobalamin (VITAMIN B12) 500 mcg tablet, Take 500 mcg by mouth daily. , Disp: , Rfl:    Date Last Reviewed:  5/7/2019   Number of Personal Factors/Comorbidities that affect the Plan of Care: 0: LOW COMPLEXITY   EXAMINATION:   Observation:       Palpation:  Upper Trapezius:-- Levator Scapulae:-- Posterior Cervical: tender on R side   Sternocleidomastoid: Tender on R side Scalenes: Tender on R side Other:       Flexibility: Limited suboccipitals, SCM, scalenes bilaterally      Range of Motion: Cervical in degrees  Flexion: 50                                       Extesnion: 41                       Right                     Left   Rotation 61 pain 62    Side Bend 20 16       Strength: Manual Muscle Test: upper quarter screen 5/5 MMT bilaterally. Cervical Flexion Endurance Test: --    Special Testing:  Spurlings Right: Left:   Upper limb Tension Test (ULTT1) -- --   Cervical Distraction -- --   Cervical flexion rotation test (+) (+)   Other:       Neuro: reflexes 2+ bilateral UE   Body Structures Involved:  1. Joints  2. Muscles Body Functions Affected:  1. Neuromusculoskeletal  2. Movement Related Activities and Participation Affected:  1. Mobility  2. Self Care   Number of elements (examined above) that affect the Plan of Care: 1-2: LOW COMPLEXITY   CLINICAL PRESENTATION:   Presentation: Stable and uncomplicated: LOW COMPLEXITY   CLINICAL DECISION MAKING:   Outcome Measure: Tool Used: Neck Disability Index (NDI)  Score:  Initial: 11/50  Most Recent: 23/50 (Date: 03/26/19 )   Interpretation of Score: The Neck Disability Index is a revised form of the Oswestry Low Back Pain Index and is designed to measure the activities of daily living in person's with neck pain. Each section is scored on a 0-5 scale, 5 representing the greatest disability. The scores of each section are added together for a total score of 50.        Medical Necessity:   · Patient is expected to demonstrate progress in strength and range of motion to increase independence with sleeping, driving, and ADL's. Reason for Services/Other Comments:  · Patient will benefit from skilled PT to address impairments identified through evaluation and return to previous ADL and recreational capacity. Use of outcome tool(s) and clinical judgement create a POC that gives a: Clear prediction of patient's progress: LOW COMPLEXITY            TREATMENT:   (In addition to Assessment/Re-Assessment sessions the following treatments were rendered)  Pre-treatment Symptoms/Complaints:  Pt. Reports good progress with HEP at the gym this week for strengthening exercises. Pain: Initial:   Pain Intensity 1: 4 /10 Post Session:  4/10     Therapeutic Exercise: (15 Minutes):  Exercises per grid below to improve mobility and strength. Required moderate verbal and manual cues to promote proper body alignment and promote proper body posture. Progressed range as indicated. Date:  5/7/2019   Activity/Exercise Parameters   Cervical rotation 2 minutes    --   Upper trapezius stretch/scalene 3 minutes   Rows (green band) --   Foam roll (thoracic extension, chest stretch) 5 minutes   Isometric cervical extensions (green band) --   Bird dog --   Cervical rotation SNAGS --   Curl ups with chin tuck on swiss ball --   T's on swiss ball with chin tuck --   Chin tuck head lift 3 x 10   Cervical Proprioception (laser) --   Modified push up with chin tuck --   Thoracic rotation with cervical rotation in sidelying --   PNF D2 in half kneeling --   Chest Press on Therapy ball with chin tuck (10#) --       Manual Therapy (    Soft Tissue Mobilization Duration  Duration: 30 Minutes): Manual techniques to facilitate improved motion and decreased pain.  (Used abbreviations: MET - muscle energy technique; PNF - proprioceptive neuromuscular facilitation; NMR - neuromuscular re-education; a/p - anterior to posterior; p/a - posterior to anterior)   · Soft tissue mobilization: R splenius capitis, sternocleidomastoid, scalenes  · Joint mobilization: C1/2 in prone unilateral p/a on the R side. Grade III-IV; C0/C1 flexion grade III  · Joint mobilization: Grade V C1/2 rotation in supine to the R side. · Manual cervical traction  · Joint mobilization: grade V C7 manipulation in prone  · Soft tissue mobilization: instrument assisted: B cervical multifidi C3-5, R splenius capitis, R SCM      [employer] Portal  Treatment/Session Assessment:    · Response to Treatment: Lower cervical flexion rotation compensation for cervical rotation movements is improving. Pt. Requires cueing at times but is working toward eliminating learned compensation. Pt. Tolerates manual therapy today without complaints. .           · Compliance with Program/Exercises: Compliant all of the time. · Recommendations/Intent for next treatment session: \"Next visit will focus on manual therapy and strengthening exercises. \".   Total Treatment Duration: 45 minutes total time  PT Patient Time In/Time Out  Time In: 0420  Time Out: [address] Blossom Haile, PT

## 2019-05-14 ENCOUNTER — HOSPITAL ENCOUNTER (OUTPATIENT)
Dept: PHYSICAL THERAPY | Age: 69
Discharge: HOME | End: 2019-05-14
Payer: MEDICARE

## 2019-05-14 PROCEDURE — 97110 THERAPEUTIC EXERCISES: CPT

## 2019-05-14 PROCEDURE — 97140 MANUAL THERAPY 1/> REGIONS: CPT

## 2019-05-14 NOTE — PROGRESS NOTES
Edwardo Russ  : 1950  [place]  [place] [address] [place] [address], [address] [facility]  [phone]   [phone]        OUTPATIENT PHYSICAL THERAPY:Daily Note 2019   ICD-10: Treatment Diagnosis: Cervicalgia [M54.2]; Headache [R51]  Precautions/Allergies:   Patient has no known allergies. MD Orders: Evaluate and Treat MEDICAL/REFERRING DIAGNOSIS:  Neck pain [M54.2]   DATE OF ONSET: 2018  REFERRING PHYSICIAN: Alex Clemons MD  RETURN PHYSICIAN APPOINTMENT: TBD     INITIAL ASSESSMENT:  Mr. Pau Garrett presents with signs of R sided neck pain. Mobility restrictions are present in the upper cervical spine bilaterally with pain present during right sided cervical rotation. Pt. Demonstrates moderate myofascial pain additionally in the R splenius capitis, R sternocleidomastoid, and R scalene musculature. Pt. Will benefit from skilled PT including manual therapy, range of motion, and mobility exercises to address impairments identified. 18 Progress Report: Pt. Attends 10 physical therapy visits. Pt. Demonstrates improved cervical ROM in all directions. Despite improved cervical ROM, pain remains in the R posterior cervical region and R Sternocleidomastoid with cervical rotations movements bilaterally. Upper cervical joint mobility has improved but patient remains positive with the upper cervical flexion rotation test.  Pt. Will benefit from continued manual therapy and deep neck flexor strengthening to address remaining pain. 19 Progress Report: Improvements in cervical rotation and extension remain, but patient continues to experience pain during cervical rotation. PT suspects mild cervical dystonia symptoms resulting in antagonistic muscle contraction with cervical rotation.   Joint limitations remain in the upper cervical spine resulting in compensated lateral flexion during R sided cervical rotation. Pt. Fidelina Hiram to benefit from physical therapy but may benefit from further alternative treatment additionally. 3/26/19 Progress Report: Re-assessment today is performed during an acute flare up of symptoms following a trip to [place]. Pt. Was making good progress toward pain free cervical ROM and advancement of cervical and scapular strengthening exercises. Due to flare up, subjective pain levels and functional outcome measures are worse at this time. Pt. Overall has improved since starting PT but again is encouraged to f/u with referring M.D. For further alternative treatment. Pt. Will benefit from continued PT to address pain and ADL deficits. PROBLEM LIST (Impacting functional limitations):  1. Decreased Strength  2. Increased Pain  3. Decreased Activity Tolerance  4. Decreased Flexibility/Joint Mobility  5. Decreased [place] with Home Exercise Program INTERVENTIONS PLANNED:  1. Home Exercise Program (HEP)  2. Manual Therapy  3. Range of Motion (ROM)  4. Therapeutic Exercise/Strengthening   TREATMENT PLAN:  Effective Dates: 04/09/2019 TO 06/08/2019  (60 days). Frequency/Duration: 1-2 times a week for 60 Day(s)  GOALS: (Goals have been discussed and agreed upon with patient.)  Discharge Goals: Time Frame: 6 weeks  1. Pt. Will demonstrate 60 degrees of cervical rotation bilaterally to improve driving safety. MET  2. Pt. Will demonstrate <3/10 R sided neck pain with 8 hours of sleep to improve pain. NOT MET  3. Pt. Will score < 12% on the NDI to demonstrate improved pain and function. NOT MET  Rehabilitation Potential For Stated Goals: Good              The information in this section was collected on 11/9/18 (except where otherwise noted). HISTORY:   History of Present Injury/Illness (Reason for Referral):  Pt. Attends PT c/o R sided neck pain with gradual insidious onset about a year ago.   The pain is located along the R side of the neck and aggravated with turning the head, sleeping at night, and performing daily activities. Pt. Denies injury or numbness tingling, difficulty speaking, tinnitus, or difficulty swallowing. Pt. Notes only treatment so far was massage that helped for a few hours. Pt. Would like to address pain and return to previous level of function. Past Medical History/Comorbidities:   Mr. Dank Cortes  has a past medical history of Bradycardia, HLD (hyperlipidemia), Osteoarthritis, and Personal history of colonic polyps (2014). He also has no past medical history of Aneurysm ([address] Coagulation disorder ([address] Difficult intubation, Heart failure ([address] Ill-defined condition, Malignant hyperthermia due to anesthesia, Morbid obesity ([address] Nausea & vomiting, Pseudocholinesterase deficiency, Psychiatric disorder, Unspecified adverse effect of anesthesia, or Unspecified sleep apnea. Mr. Dank Cortes  has a past surgical history that includes hx gi (2007); hx colonoscopy (9/10/14); and pr abdomen surgery proc unlisted. Social History/Living Environment:     Lives with family in 2 story home  Prior Level of Function/Work/Activity:  Functioned independently without limitations. Ambulatory/Rehab Services H2 Model Falls Risk Assessment    Risk Factors:       (1)  Gender [Male] Ability to Rise from Chair:       (0)  Ability to rise in a single movement    Falls Prevention Plan:       No modifications necessary   Total: (5 or greater = High Risk): 1    ©2010 [facility] [employer]. All Rights Reserved. [facility] Patent #[id number]. Federal Law prohibits the replication, distribution or use without written permission from [facility] [employer]     Current Medications:       Current Outpatient Medications:     multivitamin (ONE A DAY) tablet, Take 1 tablet by mouth daily. , Disp: , Rfl:     ibuprofen (MOTRIN) 200 mg tablet, Take 200 mg by mouth every six (6) hours as needed for Pain.  Hold till after surgery   Indications: PAIN, Disp: , Rfl:     cyanocobalamin (VITAMIN B12) 500 mcg tablet, Take 500 mcg by mouth daily. , Disp: , Rfl:    Date Last Reviewed:  5/14/2019   Number of Personal Factors/Comorbidities that affect the Plan of Care: 0: LOW COMPLEXITY   EXAMINATION:   Observation:       Palpation:  Upper Trapezius:-- Levator Scapulae:-- Posterior Cervical: tender on R side   Sternocleidomastoid: Tender on R side Scalenes: Tender on R side Other:       Flexibility: Limited suboccipitals, SCM, scalenes bilaterally      Range of Motion: Cervical in degrees  Flexion: 50                                       Extesnion: 41                       Right                     Left   Rotation 61 pain 62    Side Bend 20 16       Strength: Manual Muscle Test: upper quarter screen 5/5 MMT bilaterally. Cervical Flexion Endurance Test: --    Special Testing:  Spurlings Right: Left:   Upper limb Tension Test (ULTT1) -- --   Cervical Distraction -- --   Cervical flexion rotation test (+) (+)   Other:       Neuro: reflexes 2+ bilateral UE   Body Structures Involved:  1. Joints  2. Muscles Body Functions Affected:  1. Neuromusculoskeletal  2. Movement Related Activities and Participation Affected:  1. Mobility  2. Self Care   Number of elements (examined above) that affect the Plan of Care: 1-2: LOW COMPLEXITY   CLINICAL PRESENTATION:   Presentation: Stable and uncomplicated: LOW COMPLEXITY   CLINICAL DECISION MAKING:   Outcome Measure: Tool Used: Neck Disability Index (NDI)  Score:  Initial: 11/50  Most Recent: 23/50 (Date: 03/26/19 )   Interpretation of Score: The Neck Disability Index is a revised form of the Oswestry Low Back Pain Index and is designed to measure the activities of daily living in person's with neck pain. Each section is scored on a 0-5 scale, 5 representing the greatest disability. The scores of each section are added together for a total score of 50.        Medical Necessity:   · Patient is expected to demonstrate progress in strength and range of motion to increase independence with sleeping, driving, and ADL's. Reason for Services/Other Comments:  · Patient will benefit from skilled PT to address impairments identified through evaluation and return to previous ADL and recreational capacity. Use of outcome tool(s) and clinical judgement create a POC that gives a: Clear prediction of patient's progress: LOW COMPLEXITY            TREATMENT:   (In addition to Assessment/Re-Assessment sessions the following treatments were rendered)  Pre-treatment Symptoms/Complaints:  Pt. Reports pulling in the R orbital region with cervical rotation to the L. Pt. Notes motion remain improved. Pain: Initial:   Pain Intensity 1: 4 /10 Post Session:  4/10     Therapeutic Exercise: (15 Minutes):  Exercises per grid below to improve mobility and strength. Required moderate verbal and manual cues to promote proper body alignment and promote proper body posture. Progressed range as indicated. Date:  5/14/2019   Activity/Exercise Parameters   Cervical rotation 3 x 10    --   Upper trapezius stretch/scalene 3 minutes   Rows (green band) --   Foam roll (thoracic extension, chest stretch) --   Isometric cervical extensions (green band) --   Bird dog --   Cervical rotation SNAGS --   Curl ups with chin tuck on swiss ball --   T's on swiss ball with chin tuck --   Chin tuck head lift 3 x 10   Cervical Proprioception (laser) --   Modified push up with chin tuck --   Thoracic rotation with cervical rotation in sidelying --   PNF D2 in half kneeling --   Chest Press on Therapy ball with chin tuck (10#) 3 x 10       Manual Therapy (    Soft Tissue Mobilization Duration  Duration: 30 Minutes): Manual techniques to facilitate improved motion and decreased pain.  (Used abbreviations: MET - muscle energy technique; PNF - proprioceptive neuromuscular facilitation; NMR - neuromuscular re-education; a/p - anterior to posterior; p/a - posterior to anterior)   · Soft tissue mobilization: R splenius capitis, sternocleidomastoid, scalenes  · Joint mobilization: C1/2 in prone unilateral p/a on the R side. Grade III-IV; C0/C1 flexion grade III  · Joint mobilization: Grade V C1/2 rotation in supine to the R side. · Manual cervical traction  · Joint mobilization: grade V C7 manipulation in prone (NOT PERFORMED)  · Soft tissue mobilization: instrument assisted: R middle scalenes, R splenius capitis. MedBridge Portal  Treatment/Session Assessment:    · Response to Treatment: Pulling sensation in the R orbit with cervical rotation improves following manual therapy today. Pt continues to consistently achieve greater than 65 degrees cervical rotation bilaterally. Pt. Will benefit from continued manual therapy and progression of strengthening exercises to address remaining pain and ADL limitations. .           · Compliance with Program/Exercises: Compliant all of the time. · Recommendations/Intent for next treatment session: \"Next visit will focus on manual therapy and strengthening exercises. \".   Total Treatment Duration: 45 minutes total time  PT Patient Time In/Time Out  Time In: 1430  Time Out: Noah 31, PT

## 2019-05-22 ENCOUNTER — HOSPITAL ENCOUNTER (OUTPATIENT)
Dept: PHYSICAL THERAPY | Age: 69
Discharge: HOME | End: 2019-05-22
Payer: MEDICARE

## 2019-05-22 PROCEDURE — 97110 THERAPEUTIC EXERCISES: CPT

## 2019-05-22 PROCEDURE — 97140 MANUAL THERAPY 1/> REGIONS: CPT

## 2019-05-22 NOTE — PROGRESS NOTES
Xavi Lemons  : 1950  [place]  [place]: [place] [address] at [place] [address], [address] [facility]  [phone]   [phone]        OUTPATIENT PHYSICAL THERAPY:Daily Note 2019   ICD-10: Treatment Diagnosis: Cervicalgia [M54.2]; Headache [R51]  Precautions/Allergies:   Patient has no known allergies. MD Orders: Evaluate and Treat MEDICAL/REFERRING DIAGNOSIS:  Neck pain [M54.2]   DATE OF ONSET: 2018  REFERRING PHYSICIAN: Rach Reeder MD  RETURN PHYSICIAN APPOINTMENT: TBD     INITIAL ASSESSMENT:  Mr. Danyelle Phillips presents with signs of R sided neck pain. Mobility restrictions are present in the upper cervical spine bilaterally with pain present during right sided cervical rotation. Pt. Demonstrates moderate myofascial pain additionally in the R splenius capitis, R sternocleidomastoid, and R scalene musculature. Pt. Will benefit from skilled PT including manual therapy, range of motion, and mobility exercises to address impairments identified. 18 Progress Report: Pt. Attends 10 physical therapy visits. Pt. Demonstrates improved cervical ROM in all directions. Despite improved cervical ROM, pain remains in the R posterior cervical region and R Sternocleidomastoid with cervical rotations movements bilaterally. Upper cervical joint mobility has improved but patient remains positive with the upper cervical flexion rotation test.  Pt. Will benefit from continued manual therapy and deep neck flexor strengthening to address remaining pain. 19 Progress Report: Improvements in cervical rotation and extension remain, but patient continues to experience pain during cervical rotation. PT suspects mild cervical dystonia symptoms resulting in antagonistic muscle contraction with cervical rotation.   Joint limitations remain in the upper cervical spine resulting in compensated lateral flexion during R sided cervical rotation. Pt. Amado Nix to benefit from physical therapy but may benefit from further alternative treatment additionally. 3/26/19 Progress Report: Re-assessment today is performed during an acute flare up of symptoms following a trip to [place]. Pt. Was making good progress toward pain free cervical ROM and advancement of cervical and scapular strengthening exercises. Due to flare up, subjective pain levels and functional outcome measures are worse at this time. Pt. Overall has improved since starting PT but again is encouraged to f/u with referring M.D. For further alternative treatment. Pt. Will benefit from continued PT to address pain and ADL deficits. PROBLEM LIST (Impacting functional limitations):  1. Decreased Strength  2. Increased Pain  3. Decreased Activity Tolerance  4. Decreased Flexibility/Joint Mobility  5. Decreased [place] with Home Exercise Program INTERVENTIONS PLANNED:  1. Home Exercise Program (HEP)  2. Manual Therapy  3. Range of Motion (ROM)  4. Therapeutic Exercise/Strengthening   TREATMENT PLAN:  Effective Dates: 04/09/2019 TO 06/08/2019  (60 days). Frequency/Duration: 1-2 times a week for 60 Day(s)  GOALS: (Goals have been discussed and agreed upon with patient.)  Discharge Goals: Time Frame: 6 weeks  1. Pt. Will demonstrate 60 degrees of cervical rotation bilaterally to improve driving safety. MET  2. Pt. Will demonstrate <3/10 R sided neck pain with 8 hours of sleep to improve pain. NOT MET  3. Pt. Will score < 12% on the NDI to demonstrate improved pain and function. NOT MET  Rehabilitation Potential For Stated Goals: Good              The information in this section was collected on 11/9/18 (except where otherwise noted). HISTORY:   History of Present Injury/Illness (Reason for Referral):  Pt. Attends PT c/o R sided neck pain with gradual insidious onset about a year ago.   The pain is located along the R side of the neck and aggravated with turning the head, sleeping at night, and performing daily activities. Pt. Denies injury or numbness tingling, difficulty speaking, tinnitus, or difficulty swallowing. Pt. Notes only treatment so far was massage that helped for a few hours. Pt. Would like to address pain and return to previous level of function. Past Medical History/Comorbidities:   Mr. Lisa Serra  has a past medical history of Bradycardia, HLD (hyperlipidemia), Osteoarthritis, and Personal history of colonic polyps (2014). He also has no past medical history of Aneurysm ([address] Coagulation disorder ([address] Difficult intubation, Heart failure ([address] Ill-defined condition, Malignant hyperthermia due to anesthesia, Morbid obesity ([address] Nausea & vomiting, Pseudocholinesterase deficiency, Psychiatric disorder, Unspecified adverse effect of anesthesia, or Unspecified sleep apnea. Mr. Lisa Serra  has a past surgical history that includes hx gi (2007); hx colonoscopy (9/10/14); and pr abdomen surgery proc unlisted. Social History/Living Environment:     Lives with family in 2 story home  Prior Level of Function/Work/Activity:  Functioned independently without limitations. Ambulatory/Rehab Services H2 Model Falls Risk Assessment    Risk Factors:       (1)  Gender [Male] Ability to Rise from Chair:       (0)  Ability to rise in a single movement    Falls Prevention Plan:       No modifications necessary   Total: (5 or greater = High Risk): 1    ©2010 [facility] [employer]. All Rights Reserved. [facility] Patent #[id number]. Federal Law prohibits the replication, distribution or use without written permission from [facility] [employer]     Current Medications:       Current Outpatient Medications:     multivitamin (ONE A DAY) tablet, Take 1 tablet by mouth daily. , Disp: , Rfl:     ibuprofen (MOTRIN) 200 mg tablet, Take 200 mg by mouth every six (6) hours as needed for Pain.  Hold till after surgery   Indications: PAIN, Disp: , Rfl:     cyanocobalamin (VITAMIN B12) 500 mcg tablet, Take 500 mcg by mouth daily. , Disp: , Rfl:    Date Last Reviewed:  5/22/2019   Number of Personal Factors/Comorbidities that affect the Plan of Care: 0: LOW COMPLEXITY   EXAMINATION:   Observation:       Palpation:  Upper Trapezius:-- Levator Scapulae:-- Posterior Cervical: tender on R side   Sternocleidomastoid: Tender on R side Scalenes: Tender on R side Other:       Flexibility: Limited suboccipitals, SCM, scalenes bilaterally      Range of Motion: Cervical in degrees  Flexion: 50                                       Extesnion: 41                       Right                     Left   Rotation 61 pain 62    Side Bend 20 16       Strength: Manual Muscle Test: upper quarter screen 5/5 MMT bilaterally. Cervical Flexion Endurance Test: --    Special Testing:  Spurlings Right: Left:   Upper limb Tension Test (ULTT1) -- --   Cervical Distraction -- --   Cervical flexion rotation test (+) (+)   Other:       Neuro: reflexes 2+ bilateral UE   Body Structures Involved:  1. Joints  2. Muscles Body Functions Affected:  1. Neuromusculoskeletal  2. Movement Related Activities and Participation Affected:  1. Mobility  2. Self Care   Number of elements (examined above) that affect the Plan of Care: 1-2: LOW COMPLEXITY   CLINICAL PRESENTATION:   Presentation: Stable and uncomplicated: LOW COMPLEXITY   CLINICAL DECISION MAKING:   Outcome Measure: Tool Used: Neck Disability Index (NDI)  Score:  Initial: 11/50  Most Recent: 23/50 (Date: 03/26/19 )   Interpretation of Score: The Neck Disability Index is a revised form of the Oswestry Low Back Pain Index and is designed to measure the activities of daily living in person's with neck pain. Each section is scored on a 0-5 scale, 5 representing the greatest disability. The scores of each section are added together for a total score of 50.        Medical Necessity:   · Patient is expected to demonstrate progress in strength and range of motion to increase independence with sleeping, driving, and ADL's. Reason for Services/Other Comments:  · Patient will benefit from skilled PT to address impairments identified through evaluation and return to previous ADL and recreational capacity. Use of outcome tool(s) and clinical judgement create a POC that gives a: Clear prediction of patient's progress: LOW COMPLEXITY            TREATMENT:   (In addition to Assessment/Re-Assessment sessions the following treatments were rendered)  Pre-treatment Symptoms/Complaints:  Pt. Notes pain and ROM are about the same since last week. Pain: Initial:   Pain Intensity 1: 4 /10 Post Session:  4/10     Therapeutic Exercise: (15 Minutes):  Exercises per grid below to improve mobility and strength. Required moderate verbal and manual cues to promote proper body alignment and promote proper body posture. Progressed range as indicated. Date:  5/22/2019   Activity/Exercise Parameters   Cervical rotation 3 x 10    --   Upper trapezius stretch/scalene 3 minutes   Rows (green band) --   Foam roll (thoracic extension, chest stretch) --   Isometric cervical extensions (green band) --   Bird dog --   Cervical rotation SNAGS 3 x10   Curl ups with chin tuck on swiss ball --   T's on swiss ball with chin tuck --   Chin tuck head lift 3 x 10   Cervical Proprioception (laser) --   Modified push up with chin tuck --   Thoracic rotation with cervical rotation in sidelying --   PNF D2 in half kneeling --   Chest Press on Therapy ball with chin tuck (10#) --       Manual Therapy (    Soft Tissue Mobilization Duration  Duration: 30 Minutes): Manual techniques to facilitate improved motion and decreased pain.  (Used abbreviations: MET - muscle energy technique; PNF - proprioceptive neuromuscular facilitation; NMR - neuromuscular re-education; a/p - anterior to posterior; p/a - posterior to anterior)   · Soft tissue mobilization: R splenius capitis, sternocleidomastoid, scalenes  · Joint mobilization: C1/2 in prone unilateral p/a on the R side. Grade III-IV; C0/C1 flexion grade III  · Joint mobilization: Grade V C1/2 rotation in supine to the R side. · Manual cervical traction  · Joint mobilization: grade V C7 manipulation in prone (NOT PERFORMED)  · Soft tissue mobilization: instrument assisted: R middle scalenes, R splenius capitis, R SCM, posterior cervical musculature, R upper trapezius. [employer] Portal  Treatment/Session Assessment:    · Response to Treatment: Dry needling techniques are tolerated with mild pain complaints but improved subjective pain post treatment session. Pt. Is advised to maintain compliance with cervical strengthening exercises at home. Manual therapy and specific exercises continued to improve ADL performance. · Compliance with Program/Exercises: Compliant all of the time. · Recommendations/Intent for next treatment session: \"Next visit will focus on manual therapy and strengthening exercises. \".   Total Treatment Duration: 45 minutes total time  PT Patient Time In/Time Out  Time In: 0930  Time Out: [address] [place] AYLIN [place]

## 2019-06-04 ENCOUNTER — HOSPITAL ENCOUNTER (OUTPATIENT)
Dept: PHYSICAL THERAPY | Age: 69
Discharge: HOME | End: 2019-06-04
Payer: MEDICARE

## 2019-06-04 PROCEDURE — 97140 MANUAL THERAPY 1/> REGIONS: CPT

## 2019-06-04 PROCEDURE — 97110 THERAPEUTIC EXERCISES: CPT

## 2019-06-04 NOTE — PROGRESS NOTES
Sharyn Etienne  : 1950  [place]  [place]: [place] [address] [place] [place] at [address] [place] [address]  [phone]   [phone]        OUTPATIENT PHYSICAL THERAPY:Daily Note 2019   ICD-10: Treatment Diagnosis: Cervicalgia [M54.2]; Headache [R51]  Precautions/Allergies:   Patient has no known allergies. MD Orders: Evaluate and Treat MEDICAL/REFERRING DIAGNOSIS:  Neck pain [M54.2]   DATE OF ONSET: 2018  REFERRING PHYSICIAN: Luz Maria Espinal MD  RETURN PHYSICIAN APPOINTMENT: TBD     INITIAL ASSESSMENT:  Mr. Dank Cortes presents with signs of R sided neck pain. Mobility restrictions are present in the upper cervical spine bilaterally with pain present during right sided cervical rotation. Pt. Demonstrates moderate myofascial pain additionally in the R splenius capitis, R sternocleidomastoid, and R scalene musculature. Pt. Will benefit from skilled PT including manual therapy, range of motion, and mobility exercises to address impairments identified. 18 Progress Report: Pt. Attends 10 physical therapy visits. Pt. Demonstrates improved cervical ROM in all directions. Despite improved cervical ROM, pain remains in the R posterior cervical region and R Sternocleidomastoid with cervical rotations movements bilaterally. Upper cervical joint mobility has improved but patient remains positive with the upper cervical flexion rotation test.  Pt. Will benefit from continued manual therapy and deep neck flexor strengthening to address remaining pain. 19 Progress Report: Improvements in cervical rotation and extension remain, but patient continues to experience pain during cervical rotation. PT suspects mild cervical dystonia symptoms resulting in antagonistic muscle contraction with cervical rotation.   Joint limitations remain in the upper cervical spine resulting in compensated lateral flexion during R sided cervical rotation. Pt. Babara Hamman to benefit from physical therapy but may benefit from further alternative treatment additionally. 3/26/19 Progress Report: Re-assessment today is performed during an acute flare up of symptoms following a trip to [place]. Pt. Was making good progress toward pain free cervical ROM and advancement of cervical and scapular strengthening exercises. Due to flare up, subjective pain levels and functional outcome measures are worse at this time. Pt. Overall has improved since starting PT but again is encouraged to f/u with referring M.D. For further alternative treatment. Pt. Will benefit from continued PT to address pain and ADL deficits. PROBLEM LIST (Impacting functional limitations):  1. Decreased Strength  2. Increased Pain  3. Decreased Activity Tolerance  4. Decreased Flexibility/Joint Mobility  5. Decreased [place] with Home Exercise Program INTERVENTIONS PLANNED:  1. Home Exercise Program (HEP)  2. Manual Therapy  3. Range of Motion (ROM)  4. Therapeutic Exercise/Strengthening   TREATMENT PLAN:  Effective Dates: 04/09/2019 TO 06/08/2019  (60 days). Frequency/Duration: 1-2 times a week for 60 Day(s)  GOALS: (Goals have been discussed and agreed upon with patient.)  Discharge Goals: Time Frame: 6 weeks  1. Pt. Will demonstrate 60 degrees of cervical rotation bilaterally to improve driving safety. MET  2. Pt. Will demonstrate <3/10 R sided neck pain with 8 hours of sleep to improve pain. NOT MET  3. Pt. Will score < 12% on the NDI to demonstrate improved pain and function. NOT MET  Rehabilitation Potential For Stated Goals: Good              The information in this section was collected on 11/9/18 (except where otherwise noted). HISTORY:   History of Present Injury/Illness (Reason for Referral):  Pt. Attends PT c/o R sided neck pain with gradual insidious onset about a year ago.   The pain is located along the R side of the neck and aggravated with turning the head, sleeping at night, and performing daily activities. Pt. Denies injury or numbness tingling, difficulty speaking, tinnitus, or difficulty swallowing. Pt. Notes only treatment so far was massage that helped for a few hours. Pt. Would like to address pain and return to previous level of function. Past Medical History/Comorbidities:   Mr. Marques Kirby  has a past medical history of Bradycardia, HLD (hyperlipidemia), Osteoarthritis, and Personal history of colonic polyps (2014). He also has no past medical history of Aneurysm ([address] Coagulation disorder ([address] Difficult intubation, Heart failure ([address] Ill-defined condition, Malignant hyperthermia due to anesthesia, Morbid obesity ([address] Nausea & vomiting, Pseudocholinesterase deficiency, Psychiatric disorder, Unspecified adverse effect of anesthesia, or Unspecified sleep apnea. Mr. Marques Kirby  has a past surgical history that includes hx gi (2007); hx colonoscopy (9/10/14); and pr abdomen surgery proc unlisted. Social History/Living Environment:     Lives with family in 2 story home  Prior Level of Function/Work/Activity:  Functioned independently without limitations. Ambulatory/Rehab Services H2 Model Falls Risk Assessment    Risk Factors:       (1)  Gender [Male] Ability to Rise from Chair:       (0)  Ability to rise in a single movement    Falls Prevention Plan:       No modifications necessary   Total: (5 or greater = High Risk): 1    ©2010 [facility] [employer]. All Rights Reserved. [facility] Patent #[id number]. Federal Law prohibits the replication, distribution or use without written permission from [facility] [employer]     Current Medications:       Current Outpatient Medications:     multivitamin (ONE A DAY) tablet, Take 1 tablet by mouth daily. , Disp: , Rfl:     ibuprofen (MOTRIN) 200 mg tablet, Take 200 mg by mouth every six (6) hours as needed for Pain.  Hold till after surgery   Indications: PAIN, Disp: , Rfl:     cyanocobalamin (VITAMIN B12) 500 mcg tablet, Take 500 mcg by mouth daily. , Disp: , Rfl:    Date Last Reviewed:  6/4/2019   Number of Personal Factors/Comorbidities that affect the Plan of Care: 0: LOW COMPLEXITY   EXAMINATION:   Observation:       Palpation:  Upper Trapezius:-- Levator Scapulae:-- Posterior Cervical: tender on R side   Sternocleidomastoid: Tender on R side Scalenes: Tender on R side Other:       Flexibility: Limited suboccipitals, SCM, scalenes bilaterally      Range of Motion: Cervical in degrees  Flexion: 50                                       Extesnion: 41                       Right                     Left   Rotation 61 pain 62    Side Bend 20 16       Strength: Manual Muscle Test: upper quarter screen 5/5 MMT bilaterally. Cervical Flexion Endurance Test: --    Special Testing:  Spurlings Right: Left:   Upper limb Tension Test (ULTT1) -- --   Cervical Distraction -- --   Cervical flexion rotation test (+) (+)   Other:       Neuro: reflexes 2+ bilateral UE   Body Structures Involved:  1. Joints  2. Muscles Body Functions Affected:  1. Neuromusculoskeletal  2. Movement Related Activities and Participation Affected:  1. Mobility  2. Self Care   Number of elements (examined above) that affect the Plan of Care: 1-2: LOW COMPLEXITY   CLINICAL PRESENTATION:   Presentation: Stable and uncomplicated: LOW COMPLEXITY   CLINICAL DECISION MAKING:   Outcome Measure: Tool Used: Neck Disability Index (NDI)  Score:  Initial: 11/50  Most Recent: 23/50 (Date: 03/26/19 )   Interpretation of Score: The Neck Disability Index is a revised form of the Oswestry Low Back Pain Index and is designed to measure the activities of daily living in person's with neck pain. Each section is scored on a 0-5 scale, 5 representing the greatest disability. The scores of each section are added together for a total score of 50.        Medical Necessity:   · Patient is expected to demonstrate progress in strength and range of motion to increase independence with sleeping, driving, and ADL's. Reason for Services/Other Comments:  · Patient will benefit from skilled PT to address impairments identified through evaluation and return to previous ADL and recreational capacity. Use of outcome tool(s) and clinical judgement create a POC that gives a: Clear prediction of patient's progress: LOW COMPLEXITY            TREATMENT:   (In addition to Assessment/Re-Assessment sessions the following treatments were rendered)  Pre-treatment Symptoms/Complaints:  Pt. Reports minimal change in symptoms over the last two weeks while patient was out of town. Pain: Initial:   Pain Intensity 1: 4 /10 Post Session:  4/10     Therapeutic Exercise: (15 Minutes):  Exercises per grid below to improve mobility and strength. Required moderate verbal and manual cues to promote proper body alignment and promote proper body posture. Progressed range as indicated. Date:  6/4/2019   Activity/Exercise Parameters   Cervical rotation 3 x 10    --   Upper trapezius stretch/scalene 3 minutes   Rows (green band) --   Foam roll (thoracic extension, chest stretch) 5 minutes   Isometric cervical extensions (green band) --   Bird dog --   Cervical rotation SNAGS 3 x10   Curl ups with chin tuck on swiss ball --   T's on swiss ball with chin tuck --   Chin tuck head lift --   Cervical Proprioception (laser) --   Modified push up with chin tuck --   Thoracic rotation with cervical rotation in sidelying --   PNF D2 in half kneeling --   Chest Press on Therapy ball with chin tuck (10#) --       Manual Therapy (    Soft Tissue Mobilization Duration  Duration: 30 Minutes): Manual techniques to facilitate improved motion and decreased pain.  (Used abbreviations: MET - muscle energy technique; PNF - proprioceptive neuromuscular facilitation; NMR - neuromuscular re-education; a/p - anterior to posterior; p/a - posterior to anterior)   · Soft tissue mobilization: R splenius capitis, sternocleidomastoid, scalenes  · Joint mobilization: C1/2 in prone unilateral p/a on the R side. Grade III-IV; C0/C1 flexion grade III  · Joint mobilization: Grade V C1/2 rotation in supine to the R side. · Manual cervical traction  · Joint mobilization: grade V C7 manipulation in prone (NOT PERFORMED)  · Soft tissue mobilization: instrument assisted: R middle scalenes, R splenius capitis, posterior cervical musculature,         [employer] Portal  Treatment/Session Assessment:    · Response to Treatment: Pt. Demonstrates improved tenderness to palpation of the R SCM and upper trapezius this week. R posterior cervical musculature and scalenes remain painful. Pt. Tolerates dry needling today without complaints and minimal post treatment soreness. .           · Compliance with Program/Exercises: Compliant all of the time. · Recommendations/Intent for next treatment session: \"Next visit will focus on Manual therapy\".   Total Treatment Duration: 45 minutes total time  PT Patient Time In/Time Out  Time In: 1430  Time Out: 410 [address]

## 2019-06-11 ENCOUNTER — HOSPITAL ENCOUNTER (OUTPATIENT)
Dept: PHYSICAL THERAPY | Age: 69
Discharge: HOME | End: 2019-06-11
Payer: MEDICARE

## 2019-06-11 PROCEDURE — 97140 MANUAL THERAPY 1/> REGIONS: CPT

## 2019-06-11 PROCEDURE — 97110 THERAPEUTIC EXERCISES: CPT

## 2019-06-11 NOTE — THERAPY RECERTIFICATION
Mouna Dsouza  : 1950  [place]  [place]: [place] [address] [place] [address] at [place] [address], [address] [facility]  [phone]   [phone]        OUTPATIENT PHYSICAL THERAPY:Daily Note, Progress Report and Recertification    ICD-10: Treatment Diagnosis: Cervicalgia [M54.2]; Headache [R51]  Precautions/Allergies:   Patient has no known allergies. MD Orders: Evaluate and Treat MEDICAL/REFERRING DIAGNOSIS:  Neck pain [M54.2]   DATE OF ONSET: 2018  REFERRING PHYSICIAN: Yao Montoya MD  RETURN PHYSICIAN APPOINTMENT: TBD     INITIAL ASSESSMENT:  Mr. Agustín Rowe presents with signs of R sided neck pain. Mobility restrictions are present in the upper cervical spine bilaterally with pain present during right sided cervical rotation. Pt. Demonstrates moderate myofascial pain additionally in the R splenius capitis, R sternocleidomastoid, and R scalene musculature. Pt. Will benefit from skilled PT including manual therapy, range of motion, and mobility exercises to address impairments identified. 18 Progress Report: Pt. Attends 10 physical therapy visits. Pt. Demonstrates improved cervical ROM in all directions. Despite improved cervical ROM, pain remains in the R posterior cervical region and R Sternocleidomastoid with cervical rotations movements bilaterally. Upper cervical joint mobility has improved but patient remains positive with the upper cervical flexion rotation test.  Pt. Will benefit from continued manual therapy and deep neck flexor strengthening to address remaining pain. 19 Progress Report: Improvements in cervical rotation and extension remain, but patient continues to experience pain during cervical rotation. PT suspects mild cervical dystonia symptoms resulting in antagonistic muscle contraction with cervical rotation.   Joint limitations remain in the upper cervical spine resulting in compensated lateral flexion during R sided cervical rotation. Pt. Hyun Mancia to benefit from physical therapy but may benefit from further alternative treatment additionally. 3/26/19 Progress Report: Re-assessment today is performed during an acute flare up of symptoms following a trip to [place] [address]. Pt. Was making good progress toward pain free cervical ROM and advancement of cervical and scapular strengthening exercises. Due to flare up, subjective pain levels and functional outcome measures are worse at this time. Pt. Overall has improved since starting PT but again is encouraged to f/u with referring M.D. For further alternative treatment. Pt. Will benefit from continued PT to address pain and ADL deficits. 6/11/19 Progress Report: Cervical ROM remains improved since initial evaluation but subjective pain levels for R sided neck pain remain. Pt. Has made excellent progress with HEP for cervical strength/endurance exercises and is mostly independent at this time. Manual therapy provides consistent pain relief post session with varying lengths of improvement, but familiar pain continues to return. Pt. Will benefit from continuation of current program to address pain but may also benefit from alternative treatment to achieve remaining goals. PROBLEM LIST (Impacting functional limitations):  1. Decreased Strength  2. Increased Pain  3. Decreased Activity Tolerance  4. Decreased Flexibility/Joint Mobility  5. Decreased [place] with Home Exercise Program INTERVENTIONS PLANNED:  1. Home Exercise Program (HEP)  2. Manual Therapy  3. Range of Motion (ROM)  4. Therapeutic Exercise/Strengthening   TREATMENT PLAN:  Effective Dates: 06/08/2019 TO 09/06/2019  (90 days). Frequency/Duration: 1-2 times a week for 90 Day(s)  GOALS: (Goals have been discussed and agreed upon with patient.)  Discharge Goals: Time Frame: 6 weeks  1. Pt.  Will demonstrate 60 degrees of cervical rotation bilaterally to improve driving safety. MET  2. Pt. Will demonstrate <3/10 R sided neck pain with 8 hours of sleep to improve pain. NOT MET  3. Pt. Will score < 12% on the NDI to demonstrate improved pain and function. NOT MET  Rehabilitation Potential For Stated Goals: Good              The information in this section was collected on 11/9/18 (except where otherwise noted). HISTORY:   History of Present Injury/Illness (Reason for Referral):  Pt. Attends PT c/o R sided neck pain with gradual insidious onset about a year ago. The pain is located along the R side of the neck and aggravated with turning the head, sleeping at night, and performing daily activities. Pt. Denies injury or numbness tingling, difficulty speaking, tinnitus, or difficulty swallowing. Pt. Notes only treatment so far was massage that helped for a few hours. Pt. Would like to address pain and return to previous level of function. Past Medical History/Comorbidities:   Mr. Francisco Mayberry  has a past medical history of Bradycardia, HLD (hyperlipidemia), Osteoarthritis, and Personal history of colonic polyps (2014). He also has no past medical history of Aneurysm ([address] Coagulation disorder ([address] Difficult intubation, Heart failure ([address] Ill-defined condition, Malignant hyperthermia due to anesthesia, Morbid obesity ([address] Nausea & vomiting, Pseudocholinesterase deficiency, Psychiatric disorder, Unspecified adverse effect of anesthesia, or Unspecified sleep apnea. Mr. Francisco Mayberry  has a past surgical history that includes hx gi (2007); hx colonoscopy (9/10/14); and pr abdomen surgery proc unlisted. Social History/Living Environment:     Lives with family in 2 story home  Prior Level of Function/Work/Activity:  Functioned independently without limitations.       Ambulatory/Rehab Services H2 Model Falls Risk Assessment    Risk Factors:       (1)  Gender [Male] Ability to Rise from Chair:       (0)  Ability to rise in a single movement    Falls Prevention Plan:       No modifications necessary   Total: (5 or greater = High Risk): 1    ©2010 [facility] [place] [address] [place] [address] Patent #[id number]. Federal Law prohibits the replication, distribution or use without written permission from [facility] [employer]     Current Medications:       Current Outpatient Medications:     TURMERIC, BULK,, by Does Not Apply route., Disp: , Rfl:     naproxen sodium (ALEVE) 220 mg cap, Take  by mouth., Disp: , Rfl:     multivitamin (ONE A DAY) tablet, Take 1 tablet by mouth daily. , Disp: , Rfl:     cyanocobalamin (VITAMIN B12) 500 mcg tablet, Take 500 mcg by mouth daily. , Disp: , Rfl:    Date Last Reviewed:  6/11/2019   Number of Personal Factors/Comorbidities that affect the Plan of Care: 0: LOW COMPLEXITY   EXAMINATION:   Observation:       Palpation:  Upper Trapezius: tender on R side Levator Scapulae:-- Posterior Cervical: tender on R side   Sternocleidomastoid: Tender on R side Scalenes: Tender on R side Other:       Flexibility: Limited suboccipitals, SCM, scalenes bilaterally      Range of Motion: Cervical in degrees  Flexion: 50                                       Extesnion: 45                       Right                     Left   Rotation 61 pain 64   Side Bend 20 15       Strength: Manual Muscle Test: upper quarter screen 5/5 MMT bilaterally. Cervical Flexion Endurance Test: --    Special Testing:  Spurlings Right: Left:   Upper limb Tension Test (ULTT1) -- --   Cervical Distraction -- --   Cervical flexion rotation test (+) (+)   Other:       Neuro: reflexes 2+ bilateral UE   Body Structures Involved:  1. Joints  2. Muscles Body Functions Affected:  1. Neuromusculoskeletal  2. Movement Related Activities and Participation Affected:  1. Mobility  2.  Self Care   Number of elements (examined above) that affect the Plan of Care: 1-2: LOW COMPLEXITY   CLINICAL PRESENTATION:   Presentation: Stable and uncomplicated: LOW COMPLEXITY   CLINICAL DECISION MAKING:   Outcome Measure: Tool Used: Neck Disability Index (NDI)  Score:  Initial: 11/50  Most Recent: 10/50 (Date: 06/11/19 )   Interpretation of Score: The Neck Disability Index is a revised form of the Oswestry Low Back Pain Index and is designed to measure the activities of daily living in person's with neck pain. Each section is scored on a 0-5 scale, 5 representing the greatest disability. The scores of each section are added together for a total score of 50. Medical Necessity:   · Patient is expected to demonstrate progress in strength and range of motion to increase independence with sleeping, driving, and ADL's. Reason for Services/Other Comments:  · Patient will benefit from skilled PT to address impairments identified through evaluation and return to previous ADL and recreational capacity. Use of outcome tool(s) and clinical judgement create a POC that gives a: Clear prediction of patient's progress: LOW COMPLEXITY            TREATMENT:   (In addition to Assessment/Re-Assessment sessions the following treatments were rendered)  Pre-treatment Symptoms/Complaints:  Pt. Reports minimal change in symptoms over the last two weeks while patient was out of town. Pain: Initial:   Pain Intensity 1: 4 /10 Post Session:  4/10     Therapeutic Exercise: (15 Minutes):  Exercises per grid below to improve mobility and strength. Required moderate verbal and manual cues to promote proper body alignment and promote proper body posture. Progressed range as indicated.      Date:  6/11/2019   Activity/Exercise Parameters   Cervical rotation 3 x 10    --   Upper trapezius stretch/scalene 3 minutes   Rows (green band) --   Foam roll (thoracic extension, chest stretch) 5 minutes   Isometric cervical extensions (green band) --   Bird dog --   Cervical rotation SNAGS 3 x10   Curl ups with chin tuck on swiss ball --   T's on swiss ball with chin tuck --   Chin tuck head lift 3 x 10   Cervical Proprioception (laser) --   Modified push up with chin tuck --   Thoracic rotation with cervical rotation in sidelying --   PNF D2 in half kneeling --   Chest Press on Therapy ball with chin tuck (10#) --       Manual Therapy (    Soft Tissue Mobilization Duration  Duration: 30 Minutes): Manual techniques to facilitate improved motion and decreased pain. (Used abbreviations: MET - muscle energy technique; PNF - proprioceptive neuromuscular facilitation; NMR - neuromuscular re-education; a/p - anterior to posterior; p/a - posterior to anterior)   · Soft tissue mobilization: R splenius capitis, sternocleidomastoid, scalenes  · Joint mobilization: C1/2 in prone unilateral p/a on the R side. Grade III-IV; C0/C1 flexion grade III  · Joint mobilization: Grade V C1/2 rotation in supine to the R side. · Manual cervical traction  · Joint mobilization: grade V C7 manipulation in prone (NOT PERFORMED)  · Soft tissue mobilization: instrument assisted: R middle scalenes, R splenius capitis, posterior cervical musculature,    · Joint mobilization: R 1st rib Grade V manipulation in supine       [facility] Portal  Treatment/Session Assessment:    · Response to Treatment: Manual therapy and dry needling techniques continue to improve subjective neck pain levels following treatment session today. · Compliance with Program/Exercises: Compliant all of the time. · Recommendations/Intent for next treatment session: \"Next visit will focus on Manual therapy\".   Total Treatment Duration: 45 minutes total time  PT Patient Time In/Time Out  Time In: 0000  Time Out: 410 [address]

## 2019-06-18 ENCOUNTER — HOSPITAL ENCOUNTER (OUTPATIENT)
Dept: PHYSICAL THERAPY | Age: 69
Discharge: HOME | End: 2019-06-18
Payer: MEDICARE

## 2019-06-18 PROCEDURE — 97110 THERAPEUTIC EXERCISES: CPT

## 2019-06-18 PROCEDURE — 97140 MANUAL THERAPY 1/> REGIONS: CPT

## 2019-06-18 NOTE — PROGRESS NOTES
Gianluca Bullock  : 1950  [place]  Secondary: [facility] [place] [address] at [place] [address], [address] [facility]  [phone]   [phone]      OUTPATIENT PHYSICAL THERAPY: Daily Treatment Note 2019  Visit Count:  40    ICD-10: Treatment Diagnosis: Cervicalgia [M54.2]; Headache [R51]  Precautions/Allergies:   Patient has no known allergies. MD Orders: Evaluate and Treat  POC 19-19 MEDICAL/REFERRING DIAGNOSIS:  Neck pain [M54.2]   DATE OF ONSET: 2018  REFERRING PHYSICIAN: Li Pulido MD  RETURN PHYSICIAN APPOINTMENT: TBD       Pre-treatment Symptoms/Complaints:  R sided neck pain. Pt. Notes he was able to have about 4 days of pain relief following last PT session. Pain: Initial: Pain Intensity 1: 4 10 Post Session:  210   Medications Last Reviewed:  2019  Updated Objective Findings:  None Today  TREATMENT:     Therapeutic Exercise: (15 Minutes):  Exercises per grid below to improve mobility and strength. Required minimal verbal and manual cues to promote proper body alignment, promote proper body posture and promote proper body mechanics. Progressed resistance, range and repetitions as indicated. Date:  2019   Activity/Exercise Parameters   Chin tuck head lift 3 x 10   Cervical rotation: supine, inclined, quadruped 5 minutes   Upper trapezius/scalene stretch 4 minutes   Foam roll Thoracic 3 minutes               Manual Therapy (    Soft Tissue Mobilization Duration  Duration: 30 Minutes): Manual techniques to facilitate improved motion and decreased pain.  (Used abbreviations: MET - muscle energy technique; PNF - proprioceptive neuromuscular facilitation; NMR - neuromuscular re-education; a/p - anterior to posterior; p/a - posterior to anterior)   · Joint mobilization: C0/C1 Grade V rotation in supine  · Joint mobilization: C1-C5 unilateral p/a in prone grade IV  · Joint mobilization: L first rib inferior glide Grade III in supine and Grade V in supine a/p  · Soft tissue mobilization: R upper trapezius, SCM, Scalene  · Soft tissue mobilization: dry needling: R middle scalene, R posterior cervical musculature. MedBridge Portal  Treatment/Session Summary:    · Response to Treatment:  Pt. demonstrate moderate tenderness with R inferior glides of the first rib today. Dry needling is tolerated with minimal complaints of pain. Pt. sustains longer duration of pain relief following last pt session. .  · Communication/Consultation:  None today  · Equipment provided today:  None today  · Recommendations/Intent for next treatment session: Next visit will focus on Manual therapy and dry needling.     Total Treatment Billable Duration:  45 minutes  PT Patient Time In/Time Out  Time In: 1430  Time Out: Ivet Ferris, PT    Future Appointments   Date Time Provider Liane Burgos   6/25/2019  2:30 PM AYLIN Breaux   7/8/2019 10:45 AM HTF LAB RESOURCE [facility]F HTF

## 2019-06-25 ENCOUNTER — HOSPITAL ENCOUNTER (OUTPATIENT)
Dept: PHYSICAL THERAPY | Age: 69
Discharge: HOME | End: 2019-06-25
Payer: MEDICARE

## 2019-06-25 PROCEDURE — 97110 THERAPEUTIC EXERCISES: CPT

## 2019-06-25 PROCEDURE — 97140 MANUAL THERAPY 1/> REGIONS: CPT

## 2019-06-25 NOTE — PROGRESS NOTES
Herminia Lipscomb  : 1950  [place]  [place] [address] at [facility], [address]  [phone]   [phone]      OUTPATIENT PHYSICAL THERAPY: Daily Treatment Note 2019  Visit Count:  41    ICD-10: Treatment Diagnosis: Cervicalgia [M54.2]; Headache [R51]  Precautions/Allergies:   Patient has no known allergies. MD Orders: Evaluate and Treat  POC 19-19 MEDICAL/REFERRING DIAGNOSIS:  Neck pain [M54.2]   DATE OF ONSET: 2018  REFERRING PHYSICIAN: Castro Mancilla MD  RETURN PHYSICIAN APPOINTMENT: TBD       Pre-treatment Symptoms/Complaints:  Pt. Reports overall improved pain. Pulling sensation with L sided cervical rotation has resolved. Pain: Initial: Pain Intensity 1: 4 10 Post Session:  2/10   Medications Last Reviewed:  2019  Updated Objective Findings:  Cervical rotation >60 degrees bilaterally today. TREATMENT:     Therapeutic Exercise: (15 Minutes):  Exercises per grid below to improve mobility and strength. Required minimal verbal and manual cues to promote proper body alignment, promote proper body posture and promote proper body mechanics. Progressed resistance, range and repetitions as indicated. Date:  2019   Activity/Exercise Parameters   Chin tuck head lift 3 x 10   Cervical rotation: supine, inclined, quadruped 5 minutes   Upper trapezius/scalene stretch 4 minutes   Foam roll Thoracic 3 minutes               Manual Therapy (    Soft Tissue Mobilization Duration  Duration: 30 Minutes): Manual techniques to facilitate improved motion and decreased pain.  (Used abbreviations: MET - muscle energy technique; PNF - proprioceptive neuromuscular facilitation; NMR - neuromuscular re-education; a/p - anterior to posterior; p/a - posterior to anterior)   · Joint mobilization: C0/C1 Grade V rotation in supine  · Joint mobilization: C1-C5 unilateral p/a in prone grade IV  · Joint mobilization: L first rib inferior glide Grade III in supine and Grade V in supine a/p  · Soft tissue mobilization: R upper trapezius, SCM, Scalene  · Soft tissue mobilization: dry needling: R middle scalene, R posterior cervical musculature. MedBridge Portal  Treatment/Session Summary:    · Response to Treatment:  Cervical ROM remains restricted <65 degrees bilaterally, but pulling pain associated with L sided rotation is improving. Pt. continues to benefit from dry needling techniques. .  · Communication/Consultation:  None today  · Equipment provided today:  None today  · Recommendations/Intent for next treatment session: Next visit will focus on Manual therapy and dry needling.     Total Treatment Billable Duration:  45 minutes  PT Patient Time In/Time Out  Time In: 1430  Time Out: [place] 4 AYLIN Ferris    Future Appointments   Date Time Provider Liane Burgos   7/8/2019 10:45 AM HTF LAB RESOURCE [facility] HTF HTF

## 2019-07-11 ENCOUNTER — HOSPITAL ENCOUNTER (OUTPATIENT)
Dept: PHYSICAL THERAPY | Age: 69
Discharge: HOME | End: 2019-07-11
Payer: MEDICARE

## 2019-07-11 NOTE — PROGRESS NOTES
Shanique Durand  : 1950  Payor: Pricilla Spatz / [place] / Product Type: Medicare /  [facility] [place]  at [address] [place] [address], [address] [place] [address]  [phone]   [phone]          DATE: 2019    Patient cancelled appointment today due to showing up at the wrong time. Will plan to follow up on next scheduled visit.     Dave Meeks, PT, DPT, OCS

## 2019-07-16 ENCOUNTER — HOSPITAL ENCOUNTER (OUTPATIENT)
Dept: PHYSICAL THERAPY | Age: 69
Discharge: HOME | End: 2019-07-16
Payer: MEDICARE

## 2019-07-16 PROCEDURE — 97110 THERAPEUTIC EXERCISES: CPT

## 2019-07-16 PROCEDURE — 97140 MANUAL THERAPY 1/> REGIONS: CPT

## 2019-07-16 NOTE — PROGRESS NOTES
Sylvia Gerber  : 1950  [place]  [place] [address] at [place] [address], [address]  [phone]   [phone]      OUTPATIENT PHYSICAL THERAPY: Daily Treatment Note 2019  Visit Count:  42    ICD-10: Treatment Diagnosis: Cervicalgia [M54.2]; Headache [R51]  Precautions/Allergies:   Patient has no known allergies. MD Orders: Evaluate and Treat  POC 19-19 MEDICAL/REFERRING DIAGNOSIS:  Neck pain [M54.2]   DATE OF ONSET: 2018  REFERRING PHYSICIAN: Shameka Hinds MD  RETURN PHYSICIAN APPOINTMENT: TBD       Pre-treatment Symptoms/Complaints:  Pt. Notes experiencing eye pulling and headache sensation with the neck pain. That sensation has resolved this week. Pain: Initial: Pain Intensity 1: 4 10 Post Session:  10   Medications Last Reviewed:  2019  Updated Objective Findings:  None Today  TREATMENT:     Therapeutic Exercise: (15 Minutes):  Exercises per grid below to improve mobility and strength. Required minimal verbal and manual cues to promote proper body alignment, promote proper body posture and promote proper body mechanics. Progressed resistance, range and repetitions as indicated. Date:  2019   Activity/Exercise Parameters   Chin tuck head lift 3 x 10   Cervical rotation: supine, inclined, quadruped 5 minutes   Upper trapezius/scalene stretch 4 minutes   Foam roll Thoracic 3 minutes               Manual Therapy (    Soft Tissue Mobilization Duration  Duration: 30 Minutes): Manual techniques to facilitate improved motion and decreased pain.  (Used abbreviations: MET - muscle energy technique; PNF - proprioceptive neuromuscular facilitation; NMR - neuromuscular re-education; a/p - anterior to posterior; p/a - posterior to anterior)   · Joint mobilization: C0/C1 Grade V rotation in supine  · Joint mobilization: C1-C5 unilateral p/a in prone grade IV  · Joint mobilization: L first rib inferior glide Grade III in supine and Grade V in supine a/p  · Soft tissue mobilization: R upper trapezius, SCM, Scalene  · Soft tissue mobilization: dry needling: R middle scalene, R posterior cervical musculature, R upper trapezius. MedBridge Portal  Treatment/Session Summary:    · Response to Treatment:  Some regression of cervical ROM is noted today for rotation in bilateral directions. ROM improves with manual therapy today. Pt. tolerates dry needling without complaints. .  · Communication/Consultation:  None today  · Equipment provided today:  None today  · Recommendations/Intent for next treatment session: Next visit will focus on Manual therapy and dry needling.     Total Treatment Billable Duration:  45 minutes  PT Patient Time In/Time Out  Time In: 1430  Time Out: Ivet Ferris PT    Future Appointments   Date Time Provider Liane Burgso   7/23/2019  2:30 PM Francisco Avalos PT [facility]

## 2019-08-06 ENCOUNTER — APPOINTMENT (OUTPATIENT)
Dept: PHYSICAL THERAPY | Age: 69
End: 2019-08-06
Payer: MEDICARE

## 2019-08-06 ENCOUNTER — HOSPITAL ENCOUNTER (OUTPATIENT)
Dept: PHYSICAL THERAPY | Age: 69
Discharge: HOME | End: 2019-08-06
Payer: MEDICARE

## 2019-08-06 PROCEDURE — 97110 THERAPEUTIC EXERCISES: CPT

## 2019-08-06 PROCEDURE — 97140 MANUAL THERAPY 1/> REGIONS: CPT

## 2019-08-06 NOTE — PROGRESS NOTES
Miguelina Meeks  : 1950  [place]  [place]: [place] [address] at [place] [address], [address] [place] [address]  [phone]   [phone]      OUTPATIENT PHYSICAL THERAPY: Daily Treatment Note 2019  Visit Count:  43    ICD-10: Treatment Diagnosis: Cervicalgia [M54.2]; Headache [R51]  Precautions/Allergies:   Patient has no known allergies. MD Orders: Evaluate and Treat  POC 19-19 MEDICAL/REFERRING DIAGNOSIS:  Neck pain [M54.2]   DATE OF ONSET: 2018  REFERRING PHYSICIAN: Rony Anderson MD  RETURN PHYSICIAN APPOINTMENT: TBD       Pre-treatment Symptoms/Complaints:  Pt. Reports continued pain and tightness in the R side of the neck after 3 week PT absence. Pain: Initial: Pain Intensity 1: 4 /10 Post Session:  2/10   Medications Last Reviewed:  2019  Updated Objective Findings:  Cervical rotation to the L is 65 degrees, Cervical rotation to the R is 50 degrees  TREATMENT:     Therapeutic Exercise: (15 Minutes):  Exercises per grid below to improve mobility and strength. Required minimal verbal and manual cues to promote proper body alignment, promote proper body posture and promote proper body mechanics. Progressed resistance, range and repetitions as indicated. Date:  2019   Activity/Exercise Parameters   Chin tuck head lift    Cervical rotation: supine, inclined, quadruped 5 minutes   Upper trapezius/scalene stretch 4 minutes   Foam roll Thoracic 3 minutes   Pectoralis stretch 3 minutes           Manual Therapy (    Soft Tissue Mobilization Duration  Duration: 30 Minutes): Manual techniques to facilitate improved motion and decreased pain.  (Used abbreviations: MET - muscle energy technique; PNF - proprioceptive neuromuscular facilitation; NMR - neuromuscular re-education; a/p - anterior to posterior; p/a - posterior to anterior)   · Joint mobilization: C0/C1 Grade V rotation in supine  · Joint mobilization: C1-C5 unilateral p/a in prone grade IV  · Joint mobilization: L first rib inferior glide Grade III in supine and Grade V in supine a/p  · Soft tissue mobilization: R upper trapezius, SCM, Scalene  · Soft tissue mobilization: dry needling: R posterior cervical multifidi C3-6, R splenius capitis       MedBridge Portal  Treatment/Session Summary:    · Response to Treatment:  Pt. tolerates manual therapy today without complaints and improved subjective pain. Symptoms have regressed with decreased frequency of PT over the past 2 months. .  · Communication/Consultation:  None today  · Equipment provided today:  None today  · Recommendations/Intent for next treatment session: Next visit will focus on Manual therapy and dry needling.     Total Treatment Billable Duration:  45 minutes  PT Patient Time In/Time Out  Time In: 1130  Time Out: [facility] , PT    Future Appointments   Date Time Provider Liane Burgos   8/13/2019  1:30 [facility]   8/20/2019  1:30 PM [facility]   8/27/2019  9:30 AM [facility]

## 2019-08-13 ENCOUNTER — HOSPITAL ENCOUNTER (OUTPATIENT)
Dept: PHYSICAL THERAPY | Age: 69
Discharge: HOME | End: 2019-08-13
Payer: MEDICARE

## 2019-08-13 PROCEDURE — 97140 MANUAL THERAPY 1/> REGIONS: CPT

## 2019-08-13 PROCEDURE — 97110 THERAPEUTIC EXERCISES: CPT

## 2019-08-13 NOTE — PROGRESS NOTES
Maria Luz Duran  : 1950  [place]  [place]: [place] [address] at [place] [address], [address] [facility]  [phone]   [phone]      OUTPATIENT PHYSICAL THERAPY: Daily Treatment Note 2019  Visit Count:  44    ICD-10: Treatment Diagnosis: Cervicalgia [M54.2]; Headache [R51]  Precautions/Allergies:   Patient has no known allergies. MD Orders: Evaluate and Treat  POC 19-19 MEDICAL/REFERRING DIAGNOSIS:  Neck pain [M54.2]   DATE OF ONSET: 2018  REFERRING PHYSICIAN: Donny Walker MD  RETURN PHYSICIAN APPOINTMENT: TBD       Pre-treatment Symptoms/Complaints:  Pt. Reports continued pain and tightness in the R side of the neck after 3 week PT absence. Pain: Initial: Pain Intensity 1: 10 Post Session:  2/10   Medications Last Reviewed:  2019  Updated Objective Findings:  None Today  TREATMENT:     Therapeutic Exercise: (15 Minutes):  Exercises per grid below to improve mobility and strength. Required minimal verbal and manual cues to promote proper body alignment, promote proper body posture and promote proper body mechanics. Progressed resistance, range and repetitions as indicated. Date:  2019   Activity/Exercise Parameters   Chin tuck head lift --   Cervical rotation: supine, inclined, quadruped --   Upper trapezius/scalene stretch 4 minutes   Foam roll Thoracic 3 minutes   Pectoralis stretch 3 minutes   Cervical extension (prone off table) 3 x 10       Manual Therapy (    Soft Tissue Mobilization Duration  Duration: 30 Minutes): Manual techniques to facilitate improved motion and decreased pain.  (Used abbreviations: MET - muscle energy technique; PNF - proprioceptive neuromuscular facilitation; NMR - neuromuscular re-education; a/p - anterior to posterior; p/a - posterior to anterior)   · Joint mobilization: C0/C1 Grade V rotation in supine  · Joint mobilization: C1-C5 unilateral p/a in prone grade IV  · Joint mobilization: L first rib inferior glide Grade III in supine and Grade V in supine a/p  · Soft tissue mobilization: R upper trapezius, SCM, Scalene  · Soft tissue mobilization: dry needling: R posterior cervical multifidi C3-6, R splenius capitis, R middle scalene, R upper trapezius, R SCM       MedBridge Portal  Treatment/Session Summary:    · Response to Treatment:  Increased soft tissue mobilization techniques are utilized to address continued R sided neck pain. Pt. requires some review of HEP strengthening exercises today. .  Driving remains painful for patient when checking side mirrors due to pain with cervical rotation at end range. · Communication/Consultation:  None today  · Equipment provided today:  None today  · Recommendations/Intent for next treatment session: Next visit will focus on Manual therapy and dry needling.     Total Treatment Billable Duration:  45 minutes  PT Patient Time In/Time Out  Time In: 1330  Time Out: [address] [facility], PT    Future Appointments   Date Time Provider Liane Burgos   8/20/2019  1:30 PM Juana Hernández [facility]   8/27/2019  9:30 AM Caesar Mendiola PT [facility]

## 2019-08-15 ENCOUNTER — APPOINTMENT (OUTPATIENT)
Dept: PHYSICAL THERAPY | Age: 69
End: 2019-08-15
Payer: MEDICARE

## 2019-08-20 ENCOUNTER — HOSPITAL ENCOUNTER (OUTPATIENT)
Dept: PHYSICAL THERAPY | Age: 69
Discharge: HOME | End: 2019-08-20
Payer: MEDICARE

## 2019-08-20 PROCEDURE — 97110 THERAPEUTIC EXERCISES: CPT

## 2019-08-20 PROCEDURE — 97140 MANUAL THERAPY 1/> REGIONS: CPT

## 2019-08-20 NOTE — PROGRESS NOTES
Chung Barrios  : 1950  [place]  [place] [address] at [address]  [phone]   [phone]      OUTPATIENT PHYSICAL THERAPY: Daily Treatment Note 2019  Visit Count:  45    ICD-10: Treatment Diagnosis: Cervicalgia [M54.2]; Headache [R51]  Precautions/Allergies:   Patient has no known allergies. MD Orders: Evaluate and Treat  POC 19-19 MEDICAL/REFERRING DIAGNOSIS:  Neck pain [M54.2]   DATE OF ONSET: 2018  REFERRING PHYSICIAN: May Espinal MD  RETURN PHYSICIAN APPOINTMENT: TBD       Pre-treatment Symptoms/Complaints:  Pt. Notes 3 days of pain relief following last PT session. Pt. Describes one full day in which no pain was present. Pain: Initial: Pain Intensity 1: 3 /10 Post Session:  2/10   Medications Last Reviewed:  2019  Updated Objective Findings:  Cervical flexion rotation test (+) bilaterally. TREATMENT:     Therapeutic Exercise: (15 Minutes):  Exercises per grid below to improve mobility and strength. Required minimal verbal and manual cues to promote proper body alignment, promote proper body posture and promote proper body mechanics. Progressed resistance, range and repetitions as indicated. Date:  2019   Activity/Exercise Parameters   Chin tuck head lift 3 x 10   Cervical rotation: supine, inclined, quadruped --   Upper trapezius/scalene stretch 4 minutes   Foam roll Thoracic 3 minutes   Pectoralis stretch 3 minutes   Cervical extension (prone off table) --       Manual Therapy (    Soft Tissue Mobilization Duration  Duration: 30 Minutes): Manual techniques to facilitate improved motion and decreased pain.  (Used abbreviations: MET - muscle energy technique; PNF - proprioceptive neuromuscular facilitation; NMR - neuromuscular re-education; a/p - anterior to posterior; p/a - posterior to anterior)   · Joint mobilization: C0/C1 Grade V rotation in supine  · Joint mobilization: C1-C5 unilateral p/a in prone grade IV  · Joint mobilization: L first rib inferior glide Grade III in supine and Grade V in supine a/p  · Soft tissue mobilization: R upper trapezius, SCM, Scalene  · Soft tissue mobilization: dry needling: R posterior cervical multifidi C3-6, R splenius capitis, R middle scalene, R upper trapezius, R SCM       MedBridge Portal  Treatment/Session Summary:    · Response to Treatment:  Pt. demonstrates a positive response to previous treatment. Pt. is less tender to palpation of the posterior cervical musculature today but upper cervical rotation is restricted. Pt. will benefit from continued conservative managment to improve neck pain with ADL's. · Communication/Consultation:  None today  · Equipment provided today:  None today  · Recommendations/Intent for next treatment session: Next visit will focus on Manual therapy and dry needling.     Total Treatment Billable Duration:  45 minutes  PT Patient Time In/Time Out  Time In: 1330  Time Out: [address] [facility], PT    Future Appointments   Date Time Provider Liane De La Torrei   8/27/2019  9:30 AM Geovany Evans PT [facility]   10/2/2020  9:00 AM Dennie Custard, MD [facility] HTF HT

## 2019-08-22 ENCOUNTER — APPOINTMENT (OUTPATIENT)
Dept: PHYSICAL THERAPY | Age: 69
End: 2019-08-22
Payer: MEDICARE

## 2019-08-23 NOTE — PROGRESS NOTES
I am accessing Mr. Conde's chart as a part of our department's internal chart auditing process. I certify that Mr. Lyssa Brown is, or was, a patient in our department.   Thank you,  Abebe Corrigan, PT  8/23/2019

## 2019-08-27 ENCOUNTER — HOSPITAL ENCOUNTER (OUTPATIENT)
Dept: PHYSICAL THERAPY | Age: 69
Discharge: HOME | End: 2019-08-27
Payer: MEDICARE

## 2019-08-27 PROCEDURE — 97110 THERAPEUTIC EXERCISES: CPT

## 2019-08-27 PROCEDURE — 97140 MANUAL THERAPY 1/> REGIONS: CPT

## 2019-08-27 NOTE — PROGRESS NOTES
Halifax Officer  : 1950  [place]  [place]: [place] [address] at [place] [address], [address] [facility]  [phone]   [phone]      OUTPATIENT PHYSICAL THERAPY: Daily Treatment Note 2019  Visit Count:  55    ICD-10: Treatment Diagnosis: Cervicalgia [M54.2]; Headache [R51]  Precautions/Allergies:   Patient has no known allergies. MD Orders: Evaluate and Treat  POC 19-19 MEDICAL/REFERRING DIAGNOSIS:  Neck pain [M54.2]   DATE OF ONSET: 2018  REFERRING PHYSICIAN: Kasie Arroyo MD  RETURN PHYSICIAN APPOINTMENT: TBD       Pre-treatment Symptoms/Complaints:  Pt. Reports overall improved R sided neck pain over the past two weeks. Pain: Initial: Pain Intensity 1: 3 10 Post Session:  10   Medications Last Reviewed:  2019  Updated Objective Findings:  None Today  TREATMENT:     Therapeutic Exercise: (15 Minutes):  Exercises per grid below to improve mobility and strength. Required minimal verbal and manual cues to promote proper body alignment, promote proper body posture and promote proper body mechanics. Progressed resistance, range and repetitions as indicated. Date:  2019   Activity/Exercise Parameters   Chin tuck head lift 3 x 10   Cervical rotation: supine, inclined, quadruped --   Upper trapezius/scalene stretch 4 minutes   Foam roll Thoracic --   Pectoralis stretch 3 minutes   Cervical extension (prone off table) 3 x 10       Manual Therapy (    Soft Tissue Mobilization Duration  Duration: 30 Minutes): Manual techniques to facilitate improved motion and decreased pain.  (Used abbreviations: MET - muscle energy technique; PNF - proprioceptive neuromuscular facilitation; NMR - neuromuscular re-education; a/p - anterior to posterior; p/a - posterior to anterior)   · Joint mobilization: C0/C1 Grade V rotation in supine  · Joint mobilization: C1-C5 unilateral p/a in prone grade IV  · Joint mobilization: L first rib inferior glide Grade III in supine and Grade V in supine a/p  · Soft tissue mobilization: R upper trapezius, SCM, Scalene  · Soft tissue mobilization: dry needling: R posterior cervical multifidi C3-6, R splenius capitis, R middle scalene, R upper trapezius, R SCM       MedBridge Portal  Treatment/Session Summary:    · Response to Treatment:  Pt. demonstrates decreased tenderness to palpation of the R sided cervical musculature this week. Tenderness remains in the R sternocleidomastoid muscle today. R sided cervical rotation is pain free today. .     · Communication/Consultation:  None today  · Equipment provided today:  None today  · Recommendations/Intent for next treatment session: Next visit will focus on Manual therapy and dry needling.     Total Treatment Billable Duration:  45 minutes  PT Patient Time In/Time Out  Time In: 0930  Time Out: [address] [facility], PT    Future Appointments   Date Time Provider Liane Burgos   10/2/2020  9:00 AM MD BELL Nuñez HTF HTF

## 2019-08-29 ENCOUNTER — APPOINTMENT (OUTPATIENT)
Dept: PHYSICAL THERAPY | Age: 69
End: 2019-08-29
Payer: MEDICARE

## 2019-09-12 ENCOUNTER — HOSPITAL ENCOUNTER (OUTPATIENT)
Dept: PHYSICAL THERAPY | Age: 69
Discharge: HOME | End: 2019-09-12
Payer: MEDICARE

## 2019-09-12 PROCEDURE — 97140 MANUAL THERAPY 1/> REGIONS: CPT

## 2019-09-12 PROCEDURE — 97110 THERAPEUTIC EXERCISES: CPT

## 2019-09-12 NOTE — PROGRESS NOTES
Tree Scott  : 1950  [place]  [place] [facility] at [place] [address], [address] [facility]  [phone]   [phone]      OUTPATIENT PHYSICAL THERAPY: Daily Treatment Note 2019  Visit Count:  52    ICD-10: Treatment Diagnosis: Cervicalgia [M54.2]; Headache [R51]  Precautions/Allergies:   Patient has no known allergies. MD Orders: Evaluate and Treat  POC 19-19 MEDICAL/REFERRING DIAGNOSIS:  Neck pain [M54.2]   DATE OF ONSET: 2018  REFERRING PHYSICIAN: Sally Valerio MD  RETURN PHYSICIAN APPOINTMENT: TBD       Pre-treatment Symptoms/Complaints:  Pt. Notes improved neck pain over the past month. Manual therapy last session seemed to improve symptoms. Pt. Reports gradual return of pain over the past week. Pain: Initial: Pain Intensity 1: 2 /10 Post Session:  2/10   Medications Last Reviewed:  2019  Updated Objective Findings:  See evaluation note from today  TREATMENT:     Therapeutic Exercise: (15 Minutes):  Exercises per grid below to improve mobility and strength. Required minimal verbal and manual cues to promote proper body alignment, promote proper body posture and promote proper body mechanics. Progressed resistance, range and repetitions as indicated. Date:  2019   Activity/Exercise Parameters   Chin tuck head lift    Cervical rotation: supine, inclined, quadruped --   Upper trapezius/scalene stretch 4 minutes   Foam roll Thoracic 4 minutes   Pectoralis stretch 3 minutes   Cervical extension (prone off table) 3 x 10   Chin tuck with sit up 3 x 10   Manual Therapy (    Soft Tissue Mobilization Duration  Duration: 30 Minutes): Manual techniques to facilitate improved motion and decreased pain.  (Used abbreviations: MET - muscle energy technique; PNF - proprioceptive neuromuscular facilitation; NMR - neuromuscular re-education; a/p - anterior to posterior; p/a - posterior to anterior)   · Joint mobilization: C0/C1 Grade V rotation in supine  · Joint mobilization: C1-C5 unilateral p/a in prone grade IV  · Joint mobilization: L first rib inferior glide Grade III in supine and Grade V in supine a/p  · Soft tissue mobilization: R upper trapezius, SCM, Scalene  · Soft tissue mobilization: dry needling: R posterior cervical multifidi C3-6, R splenius capitis, R middle scalene, R upper trapezius, R SCM       MedBridge Portal  Treatment/Session Summary:    · Response to Treatment:  Pt. demonstrate prolonged period of improved pain over the past two treatment sessions. Pt. demonstrates less tenderness to palpation of the cervical musculature this week. Upper cervical rotation test remains (+) bilaterally. .     · Communication/Consultation:  None today  · Equipment provided today:  None today  · Recommendations/Intent for next treatment session: Next visit will focus on Manual therapy and dry needling.     Total Treatment Billable Duration:  45 minutes  PT Patient Time In/Time Out  Time In: 0930  Time Out: [facility], PT    Future Appointments   Date Time Provider Liane Burgos   9/17/2019 11:30 AM [facility]   9/19/2019 11:30 AM Jean [facility]   10/3/2019 11:30 AM [facility]   10/10/2019 11:30 [facility]   10/17/2019 11:30 AM Jean Michelle PT [facility]   10/2/2020  9:00 AM Arnie Nickerson MD [facility]F HTF

## 2019-09-12 NOTE — THERAPY RECERTIFICATION
Nazario Ma  : 1950  [place]  [place] [address] at [address] [place] [address]  [phone]   [phone]        OUTPATIENT PHYSICAL THERAPY:Progress Report and Recertification [id number]   ICD-10: Treatment Diagnosis: Cervicalgia [M54.2]; Headache [R51]  Precautions/Allergies:   Patient has no known allergies. MD Orders: Evaluate and Treat MEDICAL/REFERRING DIAGNOSIS:  Neck pain [M54.2]   DATE OF ONSET: 2018  REFERRING PHYSICIAN: Arnie Nickerson MD  RETURN PHYSICIAN APPOINTMENT: TBD     INITIAL ASSESSMENT:  Mr. Noe Ventura presents with signs of R sided neck pain. Mobility restrictions are present in the upper cervical spine bilaterally with pain present during right sided cervical rotation. Pt. Demonstrates moderate myofascial pain additionally in the R splenius capitis, R sternocleidomastoid, and R scalene musculature. Pt. Will benefit from skilled PT including manual therapy, range of motion, and mobility exercises to address impairments identified. 18 Progress Report: Pt. Attends 10 physical therapy visits. Pt. Demonstrates improved cervical ROM in all directions. Despite improved cervical ROM, pain remains in the R posterior cervical region and R Sternocleidomastoid with cervical rotations movements bilaterally. Upper cervical joint mobility has improved but patient remains positive with the upper cervical flexion rotation test.  Pt. Will benefit from continued manual therapy and deep neck flexor strengthening to address remaining pain. 19 Progress Report: Improvements in cervical rotation and extension remain, but patient continues to experience pain during cervical rotation. PT suspects mild cervical dystonia symptoms resulting in antagonistic muscle contraction with cervical rotation.   Joint limitations remain in the upper cervical spine resulting in compensated lateral flexion during R sided cervical rotation. Pt. Ifeoma Clonts to benefit from physical therapy but may benefit from further alternative treatment additionally. 3/26/19 Progress Report: Re-assessment today is performed during an acute flare up of symptoms following a trip to [place], [place]. Pt. Was making good progress toward pain free cervical ROM and advancement of cervical and scapular strengthening exercises. Due to flare up, subjective pain levels and functional outcome measures are worse at this time. Pt. Overall has improved since starting PT but again is encouraged to f/u with referring M.D. For further alternative treatment. Pt. Will benefit from continued PT to address pain and ADL deficits. 6/11/19 Progress Report: Cervical ROM remains improved since initial evaluation but subjective pain levels for R sided neck pain remain. Pt. Has made excellent progress with HEP for cervical strength/endurance exercises and is mostly independent at this time. Manual therapy provides consistent pain relief post session with varying lengths of improvement, but familiar pain continues to return. Pt. Will benefit from continuation of current program to address pain but may also benefit from alternative treatment to achieve remaining goals. 09/12/19 Progress Report/Recertification: Pt. Achieves longer duration of pain relief with subsequent physical therapy sessions. Pt. 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  Pt. Will benefit from a continuation of current PT program to maintain progress and promote a pain free cervical ROM for ADL's and recreational activities. PROBLEM LIST (Impacting functional limitations):  1. Decreased Strength  2. Increased Pain  3. Decreased Activity Tolerance  4. Decreased Flexibility/Joint Mobility  5.  Decreased [place] with Home Exercise Program INTERVENTIONS PLANNED:  1. Home Exercise Program (HEP)  2. Manual Therapy  3. Range of Motion (ROM)  4. Therapeutic Exercise/Strengthening   TREATMENT PLAN:  Effective Dates: 09/06/2019 TO 11/01/2019  (90 days). Frequency/Duration: 2 times a week for 90 Day(s) (pt. Will likely attend 1x per week)  GOALS: (Goals have been discussed and agreed upon with patient.)  Discharge Goals: Time Frame: 6 weeks  1. Pt. Will demonstrate 60 degrees of cervical rotation bilaterally to improve driving safety. MET  2. Pt. Will demonstrate <3/10 R sided neck pain with 8 hours of sleep to improve pain. MET  3. Pt. Will score < 12% on the NDI to demonstrate improved pain and function. ONGOING  4. Pt. Will demonstrate <2/10 R sided neck pain with driving to improve safety. ONGOING  5. Pt. Will be independent with HEP for discharge. ONGOING  Rehabilitation Potential For Stated Goals: Good     Outcome Measure: Tool Used: Neck Disability Index (NDI)  Score:  Initial: 11/50  Most Recent: 8/50 (Date: 09/12/19 )   Interpretation of Score: The Neck Disability Index is a revised form of the Oswestry Low Back Pain Index and is designed to measure the activities of daily living in person's with neck pain. Each section is scored on a 0-5 scale, 5 representing the greatest disability. The scores of each section are added together for a total score of 50. UPDATED OBJECTIVE FINDINGS:        Palpation:  Upper Trapezius: -- Levator Scapulae:-- Posterior Cervical: tender on R side   Sternocleidomastoid: Tender on R side Scalenes: -- Other:       Flexibility: Limited suboccipitals, SCM, scalenes bilaterally      Range of Motion: Cervical in degrees  Flexion: 50                                       Extesnion: 45                       Right                     Left   Rotation 64 pain 66   Side Bend 20 15       Strength: Manual Muscle Test: upper quarter screen 5/5 MMT bilaterally.                         Cervical Flexion Endurance Test: --    Special Testing:  Spurlings Right: Left:   Upper limb Tension Test (ULTT1) -- --   Cervical Distraction -- --   Cervical flexion rotation test (+) (+)   Other:       Neuro: reflexes 2+ bilateral UE    Medical Necessity:   · Patient is expected to demonstrate progress in strength and range of motion to increase independence with sleeping, driving, and ADL's. Reason for Services/Other Comments:  · Patient will benefit from skilled PT to address impairments identified through evaluation and return to previous ADL and recreational capacity.   ·   Darion Valdes, PT

## 2019-09-17 ENCOUNTER — HOSPITAL ENCOUNTER (OUTPATIENT)
Dept: PHYSICAL THERAPY | Age: 69
Discharge: HOME | End: 2019-09-17
Payer: MEDICARE

## 2019-09-17 PROCEDURE — 97110 THERAPEUTIC EXERCISES: CPT

## 2019-09-17 PROCEDURE — 97140 MANUAL THERAPY 1/> REGIONS: CPT

## 2019-09-17 NOTE — PROGRESS NOTES
Maria Luz Duran  : 1950  [place]  [place] [address] at [place] [address], [address] [facility]  [phone]   [phone]      OUTPATIENT PHYSICAL THERAPY: Daily Treatment Note 2019  Visit Count:  48    ICD-10: Treatment Diagnosis: Cervicalgia [M54.2]; Headache [R51]  Precautions/Allergies:   Patient has no known allergies. MD Orders: Evaluate and Treat  POC 19-19 MEDICAL/REFERRING DIAGNOSIS:  Neck pain [M54.2]   DATE OF ONSET: 2018  REFERRING PHYSICIAN: Donny Walker MD  RETURN PHYSICIAN APPOINTMENT: TBD       Pre-treatment Symptoms/Complaints:  Neck pain remains improved. Pt. Notes mild tightness with L sided neck rotation but continued improvement with pain. Pain: Initial: Pain Intensity 1: 2 /10 Post Session:  210   Medications Last Reviewed:  2019  Updated Objective Findings:  None Today  TREATMENT:     Therapeutic Exercise: (30 Minutes):  Exercises per grid below to improve mobility and strength. Required minimal verbal and manual cues to promote proper body alignment, promote proper body posture and promote proper body mechanics. Progressed resistance, range and repetitions as indicated. Date:  2019   Activity/Exercise Parameters   Chin tuck head lift    Cervical rotation: supine, inclined, quadruped --   Upper trapezius/scalene stretch 4 minutes   Foam roll Thoracic --   Pectoralis stretch    Cervical extension (prone off table) 3 x 10   Chin tuck with sit up 3 x 10   T's and I's 3 x 15   Chest press with chin tuck on therapy ball (10#) 3 x 10   Rows (20#) 3 x 10   Manual Therapy (    Soft Tissue Mobilization Duration  Duration: 15 Minutes): Manual techniques to facilitate improved motion and decreased pain.  (Used abbreviations: MET - muscle energy technique; PNF - proprioceptive neuromuscular facilitation; NMR - neuromuscular re-education; a/p - anterior to posterior; p/a - posterior to anterior)   · Joint mobilization: C0/C1 Grade V rotation in supine (NOT PERFORMED)  · Joint mobilization: C1-C5 unilateral p/a in prone grade IV  · Joint mobilization: L first rib inferior glide Grade III in supine and Grade V in supine a/p  · Soft tissue mobilization: R upper trapezius, SCM, Scalene  · Soft tissue mobilization: dry needling: R posterior cervical multifidi C3-6, R splenius capitis, R middle scalene, R upper trapezius, R SCM (NOT PERFORMED)       [facility] Portal  Treatment/Session Summary:    · Response to Treatment:  Pt. requires cueing on appropriate performance of HEP exercises today. Pt. demonstrates improved cervical endurance with recap of HEP strenghtening exercises. .     · Communication/Consultation:  None today  · Equipment provided today:  None today  · Recommendations/Intent for next treatment session: Next visit will focus on Manual therapy and dry needling.     Total Treatment Billable Duration:  45 minutes  PT Patient Time In/Time Out  Time In: 1130  Time Out: [address] , PT    Future Appointments   Date Time Provider Liane Burgos   9/19/2019 11:30 AM Bartolo Lomas PT [facility]   10/3/2019 11:30 AM Bart[facility] Lomas [facility]   10/10/2019 11:30 AM Bartolo Lomas PT [facility]   10/17/2019 11:30 AM Bartolo Lomas, PT [facility]   10/2/2020  9:00 AM Adelia Espinal MD [facility] HTF

## 2019-09-19 ENCOUNTER — HOSPITAL ENCOUNTER (OUTPATIENT)
Dept: PHYSICAL THERAPY | Age: 69
Discharge: HOME | End: 2019-09-19
Payer: MEDICARE

## 2019-09-19 PROCEDURE — 97140 MANUAL THERAPY 1/> REGIONS: CPT

## 2019-09-19 PROCEDURE — 97110 THERAPEUTIC EXERCISES: CPT

## 2019-09-19 NOTE — PROGRESS NOTES
Samuel Kelly  : 1950  [place]  Secondary: [facility], [address] [facility]  [phone]   [phone]      OUTPATIENT PHYSICAL THERAPY: Daily Treatment Note 2019  Visit Count:  49    ICD-10: Treatment Diagnosis: Cervicalgia [M54.2]; Headache [R51]  Precautions/Allergies:   Patient has no known allergies. MD Orders: Evaluate and Treat  POC 19-19 MEDICAL/REFERRING DIAGNOSIS:  Neck pain [M54.2]   DATE OF ONSET: 2018  REFERRING PHYSICIAN: Tatyana Hubbard MD  RETURN PHYSICIAN APPOINTMENT: TBD       Pre-treatment Symptoms/Complaints:  Pt. Notes continued improvement in symptoms this week. Pt. Is to be out of town next week and will miss PT. .        Pain: Initial: Pain Intensity 1: 2 /10 Post Session:  210   Medications Last Reviewed:  2019  Updated Objective Findings:  None Today  TREATMENT:     Therapeutic Exercise: (15 Minutes):  Exercises per grid below to improve mobility and strength. Required minimal verbal and manual cues to promote proper body alignment, promote proper body posture and promote proper body mechanics. Progressed resistance, range and repetitions as indicated. Date:  2019   Activity/Exercise Parameters   Chin tuck head lift 3 x 10   Cervical rotation: supine, inclined, quadruped --   Upper trapezius/scalene stretch 4 minutes   Foam roll Thoracic 3 x 10   Pectoralis stretch 2 minutes   Cervical extension (prone off table) --   Chin tuck with sit up --   T's and I's --   Chest press with chin tuck on therapy ball (10#) --   Rows (20#) --   Manual Therapy (    Soft Tissue Mobilization Duration  Duration: 30 Minutes): Manual techniques to facilitate improved motion and decreased pain.  (Used abbreviations: MET - muscle energy technique; PNF - proprioceptive neuromuscular facilitation; NMR - neuromuscular re-education; a/p - anterior to posterior; p/a - posterior to anterior)   · Joint mobilization: C0/C1 Grade V rotation in supine   · Joint mobilization: C1-C5 unilateral p/a in prone grade IV  · Joint mobilization: L first rib inferior glide Grade III in supine and Grade V in supine a/p  · Soft tissue mobilization: R upper trapezius, SCM, Scalene  · Soft tissue mobilization: dry needling: R posterior cervical multifidi C3-6, R splenius capitis, R middle scalene, R upper trapezius, R SCM        MedBridge Portal  Treatment/Session Summary:    · Response to Treatment:  Manual therapy continues to provide pain relief in the R side of the neck. Pt. demonstrates minimal myofascial trigger points in the R SCM, posterior cervical, and upper trapezius today. Pt. will benefit from continuation of current program to maintain improvements in ADL's. · Communication/Consultation:  None today  · Equipment provided today:  None today  · Recommendations/Intent for next treatment session: Next visit will focus on Manual therapy and dry needling.     Total Treatment Billable Duration:  45 minutes  PT Patient Time In/Time Out  Time In: 1130  Time Out: Liane Noe PT    Future Appointments   Date Time Provider Liane Burgos   10/3/2019 11:30 AM AYLIN Mckeon [facility]   10/10/2019 11:30 AM AYLIN Mckeon [facility]   10/17/2019 11:30 AM Leidy Sofia PT OFF [facility]   10/2/2020  9:00 AM Rod García MD [facility] HTF HTF

## 2019-10-03 ENCOUNTER — HOSPITAL ENCOUNTER (OUTPATIENT)
Dept: PHYSICAL THERAPY | Age: 69
Discharge: HOME | End: 2019-10-03
Payer: MEDICARE

## 2019-10-03 PROCEDURE — 97140 MANUAL THERAPY 1/> REGIONS: CPT

## 2019-10-03 PROCEDURE — 97110 THERAPEUTIC EXERCISES: CPT

## 2019-10-03 NOTE — PROGRESS NOTES
Marita Newberry  : 1950  [place]  [place] [address] at [place] [address], [address] [facility]  [phone]   [phone]      OUTPATIENT PHYSICAL THERAPY: Daily Treatment Note 10/3/2019  Visit Count:  50    ICD-10: Treatment Diagnosis: Cervicalgia [M54.2]; Headache [R51]  Precautions/Allergies:   Patient has no known allergies. MD Orders: Evaluate and Treat  POC 19-19 MEDICAL/REFERRING DIAGNOSIS:  Neck pain [M54.2]   DATE OF ONSET: 2018  REFERRING PHYSICIAN: Maribel Saldivar MD  RETURN PHYSICIAN APPOINTMENT: TBD       Pre-treatment Symptoms/Complaints:  Pt. Reports being out of town last week. Pt notes neck pain remains improved. Pt. States tightness is present during R sided cervical rotation but pain has been mostly absent. Pt. Has stopped taking alleve. Pain: Initial: Pain Intensity 1: 2 10 Post Session:  2/10   Medications Last Reviewed:  10/3/2019  Updated Objective Findings:  Cervical rotation in degrees: R side 60 dgrees, L side 68 degrees  TREATMENT:     Therapeutic Exercise: (15 Minutes):  Exercises per grid below to improve mobility and strength. Required minimal verbal and manual cues to promote proper body alignment, promote proper body posture and promote proper body mechanics. Progressed resistance, range and repetitions as indicated. Date:  10/3/2019   Activity/Exercise Parameters   Chin tuck head lift 3 x 10   Cervical rotation: supine, inclined, quadruped --   Upper trapezius/scalene stretch 4 minutes   Foam roll Thoracic 3 x 10   Pectoralis stretch 2 minutes   Cervical extension (prone off table) --   Chin tuck with sit up --   T's and I's --   Chest press with chin tuck on therapy ball (10#) --   Rows (20#) --   Manual Therapy (    Soft Tissue Mobilization Duration  Duration: 30 Minutes): Manual techniques to facilitate improved motion and decreased pain. (Used abbreviations: MET - muscle energy technique; PNF - proprioceptive neuromuscular facilitation; NMR - neuromuscular re-education; a/p - anterior to posterior; p/a - posterior to anterior)   · Joint mobilization: C0/C1 Grade V rotation in supine   · Joint mobilization: C1-C5 unilateral p/a in prone grade IV  · Joint mobilization: L first rib inferior glide Grade III in supine and Grade V in supine a/p  · Soft tissue mobilization: R upper trapezius, SCM, Scalene  · Soft tissue mobilization: dry needling: R posterior cervical multifidi C3-6, R splenius capitis, R middle scalene, R upper trapezius, R SCM        MedBridge Portal  Treatment/Session Summary:    · Response to Treatment:  Cervical rotation ROM remains improved and pain free. Limitations are likely due to osteoarthritis in the cervical spine. Pt. symptoms continue to be conservatively managed with physical therapy. .     · Communication/Consultation:  None today  · Equipment provided today:  None today  · Recommendations/Intent for next treatment session: Next visit will focus on Manual therapy and dry needling.     Total Treatment Billable Duration:  45 minutes  PT Patient Time In/Time Out  Time In: 1130  Time Out: 187 [facility], PT    Future Appointments   Date Time Provider Liane Burgos   10/10/2019 11:30 AM Colette Croft PT [facility]   10/17/2019 11:30 AM Colette Croft PT [facility]   10/2/2020  9:00 AM Brian Alvarez MD [facility]

## 2019-10-10 ENCOUNTER — HOSPITAL ENCOUNTER (OUTPATIENT)
Dept: PHYSICAL THERAPY | Age: 69
Discharge: HOME | End: 2019-10-10
Payer: MEDICARE

## 2019-10-10 PROCEDURE — 97140 MANUAL THERAPY 1/> REGIONS: CPT

## 2019-10-10 PROCEDURE — 97110 THERAPEUTIC EXERCISES: CPT

## 2019-10-10 NOTE — PROGRESS NOTES
Gm Thorndike  : 1950  [place]  [place] [address] at [address]  [phone]   [phone]      OUTPATIENT PHYSICAL THERAPY: Daily Treatment Note 10/10/2019  Visit Count:  46    ICD-10: Treatment Diagnosis: Cervicalgia [M54.2]; Headache [R51]  Precautions/Allergies:   Patient has no known allergies. MD Orders: Evaluate and Treat  POC 19-19 MEDICAL/REFERRING DIAGNOSIS:  Neck pain [M54.2]   DATE OF ONSET: 2018  REFERRING PHYSICIAN: Mindy Gudino MD  RETURN PHYSICIAN APPOINTMENT: TBD       Pre-treatment Symptoms/Complaints:  Pt. Reports improved neck pain over past week. Tightness remains with cervical rotation but he has remained virtually off the aleve. Pain: Initial: Pain Intensity 1: 1 /10 Post Session:  1/10   Medications Last Reviewed:  10/10/2019  Updated Objective Findings:  None Today  TREATMENT:     Therapeutic Exercise: (15 Minutes):  Exercises per grid below to improve mobility and strength. Required minimal verbal and manual cues to promote proper body alignment, promote proper body posture and promote proper body mechanics. Progressed resistance, range and repetitions as indicated. Date:  10/10/2019   Activity/Exercise Parameters   Chin tuck head lift 3 x 10   Cervical rotation: supine, inclined, quadruped --   Upper trapezius/scalene stretch 4 minutes   Foam roll Thoracic 3 x 10   Pectoralis stretch 2 minutes   Cervical extension (prone off table) --   Chin tuck with sit up --   T's and I's --   Chest press with chin tuck on therapy ball (10#) --   Rows (20#) --   Manual Therapy (    Soft Tissue Mobilization Duration  Duration: 30 Minutes): Manual techniques to facilitate improved motion and decreased pain.  (Used abbreviations: MET - muscle energy technique; PNF - proprioceptive neuromuscular facilitation; NMR - neuromuscular re-education; a/p - anterior to posterior; p/a - posterior to anterior)   · Joint mobilization: C0/C1 Grade V rotation in supine   · Joint mobilization: C1-C5 unilateral p/a in prone grade IV  · Joint mobilization: L first rib inferior glide Grade III in supine and Grade V in supine a/p  · Soft tissue mobilization: R upper trapezius, SCM, Scalene  · Soft tissue mobilization: dry needling: R posterior cervical multifidi C3-6, R splenius capitis, R middle scalene, R upper trapezius, R SCM        MedBridge Portal  Treatment/Session Summary:    · Response to Treatment:  Physical therapy continues to provide pain relief for patient. Reliance on NSAIDS has decreased over the past couple months and patient demonstrates improved performance with checking mirrors in car while driving. Pt. will benefit from continued preventative care to maintain results at this time. .     · Communication/Consultation:  None today  · Equipment provided today:  None today  · Recommendations/Intent for next treatment session: Next visit will focus on Manual therapy and dry needling.     Total Treatment Billable Duration:  45 minutes  PT Patient Time In/Time Out  Time In: 1130  [facility]: [facility]    Future Appointments   Date Time Provider Liane Burgos   10/15/2019  4:30 PM Maria De Jesus [place], PT [facility]   10/29/2019 11:30 AM Maria De Jesus [place], PT [facility]   11/5/2019 11:30 AM Maria De Jesus [place], PT [facility]   11/19/2019 11:30 AM Maria De Jesus Spencer PT [facility]   11/26/2019 11:30 AM Maria De Jesus Spencer PT [facility]   12/3/2019 11:30 AM Maria De Jesus Spencer PT [facility]   12/10/2019 11:30 AM Maria De Jesus Spencer PT [facility]   12/17/2019 11:30 AM Maria De Jesus [place], PT [facility]   10/2/2020  9:00 AM Kavon Nevarez MD [facility]F HTF

## 2019-10-15 ENCOUNTER — HOSPITAL ENCOUNTER (OUTPATIENT)
Dept: PHYSICAL THERAPY | Age: 69
Discharge: HOME | End: 2019-10-15
Payer: MEDICARE

## 2019-10-15 PROCEDURE — 97110 THERAPEUTIC EXERCISES: CPT

## 2019-10-15 PROCEDURE — 97140 MANUAL THERAPY 1/> REGIONS: CPT

## 2019-10-15 NOTE — PROGRESS NOTES
Marita Newberry  : 1950  [place]  [place]: [place] [address] at [place] [address], [address] [facility]  [phone]   [phone]      OUTPATIENT PHYSICAL THERAPY: Daily Treatment Note 10/15/2019  Visit Count:  52    ICD-10: Treatment Diagnosis: Cervicalgia [M54.2]; Headache [R51]  Precautions/Allergies:   Patient has no known allergies. MD Orders: Evaluate and Treat  POC 19-19 MEDICAL/REFERRING DIAGNOSIS:  Neck pain [M54.2]   DATE OF ONSET: 2018  REFERRING PHYSICIAN: Maribel Sadlivar MD  RETURN PHYSICIAN APPOINTMENT: TBD       Pre-treatment Symptoms/Complaints:  Pt. Notes continued mild pain in the R neck region. Pain: Initial: Pain Intensity 1: 1 /10 Post Session:  1/10   Medications Last Reviewed:  10/15/2019  Updated Objective Findings:  PROM cervical rotation bilaterally in supine 70 degrees  TREATMENT:     Therapeutic Exercise: (15 Minutes):  Exercises per grid below to improve mobility and strength. Required minimal verbal and manual cues to promote proper body alignment, promote proper body posture and promote proper body mechanics. Progressed resistance, range and repetitions as indicated. Date:  10/15/2019   Activity/Exercise Parameters   Chin tuck head lift --   Cervical rotation: supine, inclined, quadruped --   Upper trapezius/scalene stretch 4 minutes   Foam roll Thoracic 3 x 10   Pectoralis stretch 2 minutes   Cervical extension (prone off table) 3 x 10   Chin tuck with sit up --   T's and I's --   Chest press with chin tuck on therapy ball (10#) --   Rows (20#) --   Manual Therapy (    Soft Tissue Mobilization Duration  Duration: 30 Minutes): Manual techniques to facilitate improved motion and decreased pain.  (Used abbreviations: MET - muscle energy technique; PNF - proprioceptive neuromuscular facilitation; NMR - neuromuscular re-education; a/p - anterior to posterior; p/a - posterior to anterior)   · Joint mobilization: C0/C1 Grade V rotation in supine   · Joint mobilization: C1-C5 unilateral p/a in prone grade IV  · Joint mobilization: L first rib inferior glide Grade III in supine and Grade V in supine a/p  · Soft tissue mobilization: R upper trapezius, SCM, Scalene  · Soft tissue mobilization: dry needling: R posterior cervical multifidi C3-6, R splenius capitis, R middle scalene, R upper trapezius, R SCM        MedBridge Portal  Treatment/Session Summary:    · Response to Treatment:  PROM cervical spine rotation continues to improve with 70 degrees bilaterally today. Mild pain is present this week with myofascial trigger points in the R SCM and splenius capitis. .     · Communication/Consultation:  None today  · Equipment provided today:  None today  · Recommendations/Intent for next treatment session: Next visit will focus on Manual therapy and dry needling.     Total Treatment Billable Duration:  45 minutes  PT Patient Time In/Time Out  Time In: 1600  Time Out: Berta Che [place] [address], PT    Future Appointments   Date Time Provider Liane Burgos   10/22/2019 10:00 AM HTF LAB RESOURCE [facility]   10/29/2019 11:30 AM Tamara Higganum, PT [facility]   11/5/2019 11:30 AM Tamara Higganum, PT [facility]   11/19/2019 11:30 AM Tamara Higganum, PT [facility]   11/26/2019 11:30 AM Tamara Higganum, PT [facility]   12/3/2019 11:30 AM Tamara Higganum, PT [facility]   12/10/2019 11:30 AM Tamara Higganum, PT [facility]   12/17/2019 11:30 AM Tamara Higganum, PT [facility]   10/2/2020  9:00 AM [facility]

## 2019-10-29 ENCOUNTER — HOSPITAL ENCOUNTER (OUTPATIENT)
Dept: PHYSICAL THERAPY | Age: 69
Discharge: HOME | End: 2019-10-29
Payer: MEDICARE

## 2019-10-29 NOTE — PROGRESS NOTES
Casey Bonilla  : 1950  Payor[place] Members / [place] / Product Type: Medicare /  [address]  at [facility], [address] [facility]  [phone]   [phone]          DATE: 10/29/2019    Patient cancelled appointment today due to being out of town. Will plan to follow up on next scheduled visit.     Yariel Wise, PT, DPT, OCS

## 2019-11-05 ENCOUNTER — HOSPITAL ENCOUNTER (OUTPATIENT)
Dept: PHYSICAL THERAPY | Age: 69
Discharge: HOME | End: 2019-11-05
Payer: MEDICARE

## 2019-11-05 PROCEDURE — 97110 THERAPEUTIC EXERCISES: CPT

## 2019-11-05 PROCEDURE — 97140 MANUAL THERAPY 1/> REGIONS: CPT

## 2019-11-05 NOTE — THERAPY RECERTIFICATION
Mansi Gay : 1950 [place] Secondary: [address] [facility] at [address], [address] [place] [address] [phone]   [phone] OUTPATIENT PHYSICAL THERAPY:Recertification  ICD-10: Treatment Diagnosis: Cervicalgia [M54.2]; Headache [R51] Precautions/Allergies:  
Patient has no known allergies. MD Orders: Evaluate and Treat MEDICAL/REFERRING DIAGNOSIS: 
Neck pain [M54.2] DATE OF ONSET: 2018 REFERRING PHYSICIAN: Ramírez Berry MD 
RETURN PHYSICIAN APPOINTMENT: TBD INITIAL ASSESSMENT:  Mr. Milla Christiansen presents with signs of R sided neck pain. Mobility restrictions are present in the upper cervical spine bilaterally with pain present during right sided cervical rotation. Pt. Demonstrates moderate myofascial pain additionally in the R splenius capitis, R sternocleidomastoid, and R scalene musculature. Pt. Will benefit from skilled PT including manual therapy, range of motion, and mobility exercises to address impairments identified. 18 Progress Report: Pt. Attends 10 physical therapy visits. Pt. Demonstrates improved cervical ROM in all directions. Despite improved cervical ROM, pain remains in the R posterior cervical region and R Sternocleidomastoid with cervical rotations movements bilaterally. Upper cervical joint mobility has improved but patient remains positive with the upper cervical flexion rotation test.  Pt. Will benefit from continued manual therapy and deep neck flexor strengthening to address remaining pain. 19 Progress Report: Improvements in cervical rotation and extension remain, but patient continues to experience pain during cervical rotation. PT suspects mild cervical dystonia symptoms resulting in antagonistic muscle contraction with cervical rotation.   Joint limitations remain in the upper cervical spine resulting in compensated lateral flexion during R sided cervical rotation. Pt. Sae Marlow to benefit from physical therapy but may benefit from further alternative treatment additionally. 3/26/19 Progress Report: Re-assessment today is performed during an acute flare up of symptoms [facility] Pt. Was making good progress toward pain free cervical ROM and advancement of cervical and scapular strengthening exercises. Due to flare up, subjective pain levels and functional outcome measures are worse at this time. Pt. Overall has improved since starting PT but again is encouraged to f/u with referring M.D. For further alternative treatment. Pt. Will benefit from continued PT to address pain and ADL deficits. 6/11/19 Progress Report: Cervical ROM remains improved since initial evaluation but subjective pain levels for R sided neck pain remain. Pt. Has made excellent progress with HEP for cervical strength/endurance exercises and is mostly independent at this time. Manual therapy provides consistent pain relief post session with varying lengths of improvement, but familiar pain continues to return. Pt. Will benefit from continuation of current program to address pain but may also benefit from alternative treatment to achieve remaining goals. 09/12/19 Progress Report/Recertification: Pt. Achieves longer duration of pain relief with subsequent physical therapy sessions. Pt. 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  Pt. Will benefit from a continuation of current PT program to maintain progress and promote a pain free cervical ROM for ADL's and recreational activities. PROBLEM LIST (Impacting functional limitations): 1. Decreased Strength 2. Increased Pain 3. Decreased Activity Tolerance 4. Decreased Flexibility/Joint Mobility 5. Decreased [place] with Home Exercise Program INTERVENTIONS PLANNED: 
1. Home Exercise Program (HEP) 2. Manual Therapy 3. Range of Motion (ROM) 4. Therapeutic Exercise/Strengthening TREATMENT PLAN: 
Effective Dates: 01/11/19 TO 01/01/20   Frequency/Duration: 2 times a week for 9 weeks (pt. Will likely attend 1x per week) GOALS: (Goals have been discussed and agreed upon with patient.) Discharge Goals: Time Frame: 6 weeks 1. Pt. Will demonstrate 60 degrees of cervical rotation bilaterally to improve driving safety. MET 2. Pt. Will demonstrate <3/10 R sided neck pain with 8 hours of sleep to improve pain. MET 3. Pt. Will score < 12% on the NDI to demonstrate improved pain and function. ONGOING 4. Pt. Will demonstrate <2/10 R sided neck pain with driving to improve safety. ONGOING 5. Pt. Will be independent with HEP for discharge. ONGOING Rehabilitation Potential For Stated Goals: Good Outcome Measure: Tool Used: Neck Disability Index (NDI) Score:  Initial: 11/50  Most Recent: 8/50 (Date: 11/5/19 ) Interpretation of Score: The Neck Disability Index is a revised form of the Oswestry Low Back Pain Index and is designed to measure the activities of daily living in person's with neck pain. Each section is scored on a 0-5 scale, 5 representing the greatest disability. The scores of each section are added together for a total score of 50. UPDATED OBJECTIVE FINDINGS:   
 
 
Palpation: 
Upper Trapezius: -- Levator Scapulae:-- Posterior Cervical: tender on R side Sternocleidomastoid: Tender on R side Scalenes: -- Other:  
 
 
Flexibility: Limited suboccipitals, SCM, scalenes bilaterally Range of Motion: Cervical in degrees Flexion: 50                                      
Extesnion: 45 Right                     Left Rotation 64 pain 66 Side Bend 20 15 Strength: Manual Muscle Test: upper quarter screen 5/5 MMT bilaterally. Cervical Flexion Endurance Test: -- Special Testing: 
Spurl[place] Right: Left:  
Upper limb Tension Test (ULTT1) -- --  
Cervical Distraction -- --  
Cervical flexion rotation test (+) (+) Other:    
 
Neuro: reflexes 2+ bilateral UE Medical Necessity:  
· Patient is expected to demonstrate progress in strength and range of motion to increase independence with sleeping, driving, and ADL's. Reason for Services/Other Comments: 
· Patient will benefit from skilled PT to address impairments identified through evaluation and return to previous ADL and recreational capacity. · Inderjit Alonzo, PT

## 2019-11-05 NOTE — PROGRESS NOTES
Rocio Saunders  : 1950  [place]  [place]: [place] [address] at [address]  [phone]   [phone]      OUTPATIENT PHYSICAL THERAPY: Daily Treatment Note 2019  Visit Count:  48    ICD-10: Treatment Diagnosis: Cervicalgia [M54.2]; Headache [R51]  Precautions/Allergies:   Patient has no known allergies. MD Orders: Evaluate and Treat  POC 19 - 20 MEDICAL/REFERRING DIAGNOSIS:  Neck pain [M54.2]   DATE OF ONSET: 2018  REFERRING PHYSICIAN: Kip Willoughby MD  RETURN PHYSICIAN APPOINTMENT: TBD       Pre-treatment Symptoms/Complaints:  Pt. Returns to PT following 2 weeks out of town. Pt. Notes neck pain has remained low but he has experienced a few flare ups. Pain was managed with cold pack. Pain: Initial: Pain Intensity 1: 1 /10 Post Session:  1/10   Medications Last Reviewed:  2019  Updated Objective Findings:  None Today  TREATMENT:     Therapeutic Exercise: (15 Minutes):  Exercises per grid below to improve mobility and strength. Required minimal verbal and manual cues to promote proper body alignment, promote proper body posture and promote proper body mechanics. Progressed resistance, range and repetitions as indicated. Date:  2019   Activity/Exercise Parameters   Chin tuck head lift --   Cervical rotation: supine, inclined, quadruped --   Upper trapezius/scalene stretch 4 minutes   Foam roll Thoracic --   Pectoralis stretch 2 minutes   Cervical extension (prone off table) --   Chin tuck with sit up --   T's and I's 3 x 10   Chest press with chin tuck on therapy ball (10#) --   Rows (20#) 3 x 15   Manual Therapy (    Soft Tissue Mobilization Duration  Duration: 30 Minutes): Manual techniques to facilitate improved motion and decreased pain.  (Used abbreviations: MET - muscle energy technique; PNF - proprioceptive neuromuscular facilitation; NMR - neuromuscular re-education; a/p - anterior to posterior; p/a - posterior to anterior)   · Joint mobilization: C0/C1 Grade V rotation in supine   · Joint mobilization: C1-C5 unilateral p/a in prone grade IV  · Joint mobilization: L first rib inferior glide Grade III in supine and Grade V in supine a/p  · Soft tissue mobilization: R upper trapezius, SCM, Scalene  · Soft tissue mobilization: dry needling: R posterior cervical multifidi C3-6, R splenius capitis, R middle scalene, R upper trapezius, R SCM        MedBridge Portal  Treatment/Session Summary:    · Response to Treatment:  R sided cervical rotation is limited today at the onset of therapy. Manual physical therapy to the cervical spine improves movement. Dry needling techniques continue to provide pain management of cervical pain. Pt. performs HEP for cervical and scapular strengthening at home additionally. .     · Communication/Consultation:  None today  · Equipment provided today:  None today  · Recommendations/Intent for next treatment session: Next visit will focus on Manual therapy and dry needling.     Total Treatment Billable Duration:  45 minutes  PT Patient Time In/Time Out  Time In: 1130  Time Out: [facility]    Future Appointments   Date Time Provider Liane Burgos   11/21/2019 11:30 AM Nury Rodriguez PT [facility]   11/26/2019 11:30 AM Nury Rodriguez, PT [facility]   12/5/2019 11:30 AM Nury Rodriguez PT [facility]   12/12/2019 11:30 AM Nury Rodriguez PT [facility]   12/19/2019 11:30 AM Nury Rodriguez PT [facility]   10/2/2020  9:00 AM Jaki Alegria MD [facility] HTF HTF

## 2019-11-21 ENCOUNTER — HOSPITAL ENCOUNTER (OUTPATIENT)
Dept: PHYSICAL THERAPY | Age: 69
Discharge: HOME | End: 2019-11-21
Payer: MEDICARE

## 2019-11-21 PROCEDURE — 97140 MANUAL THERAPY 1/> REGIONS: CPT

## 2019-11-21 PROCEDURE — 97110 THERAPEUTIC EXERCISES: CPT

## 2019-11-26 ENCOUNTER — HOSPITAL ENCOUNTER (OUTPATIENT)
Dept: PHYSICAL THERAPY | Age: 69
Discharge: HOME | End: 2019-11-26
Payer: MEDICARE

## 2019-11-26 PROCEDURE — 97140 MANUAL THERAPY 1/> REGIONS: CPT

## 2019-11-26 PROCEDURE — 97110 THERAPEUTIC EXERCISES: CPT

## 2019-11-26 NOTE — PROGRESS NOTES
Paul Perez  : 1950  [place]  Secondary: [facility]- at [address]  [phone]   [phone]      OUTPATIENT PHYSICAL THERAPY: Daily Treatment Note 2019  Visit Count:  54    ICD-10: Treatment Diagnosis: Cervicalgia [M54.2]; Headache [R51]  Precautions/Allergies:   Patient has no known allergies. MD Orders: Evaluate and Treat  POC 19 - 20 MEDICAL/REFERRING DIAGNOSIS:  Neck pain [M54.2]   DATE OF ONSET: 2018  REFERRING PHYSICIAN: Genny Kayser, MD  RETURN PHYSICIAN APPOINTMENT: TBD       Pre-treatment Symptoms/Complaints:  Pt. Reports remaining stiffness in the R cervical region. Otherwise he is mostly pain free. Pain: Initial: Pain Intensity 1: 1 /10 Post Session:  1/10   Medications Last Reviewed:  2019  Updated Objective Findings:  L cervical rotation 67 degrees, R cervical rotation 62 degrees  TREATMENT:     Therapeutic Exercise: (15 Minutes):  Exercises per grid below to improve mobility and strength. Required minimal verbal and manual cues to promote proper body alignment, promote proper body posture and promote proper body mechanics. Progressed resistance, range and repetitions as indicated. Date:  2019   Activity/Exercise Parameters   Chin tuck head lift --   Cervical rotation: supine, inclined, quadruped --   Upper trapezius/scalene stretch 4 minutes   Foam roll Thoracic 2 minutes   Pectoralis stretch 2 minutes   Cervical extension (prone off table) --   Chin tuck with sit up --   T's and I's --   Chest press with chin tuck on therapy ball (10#) 3 x 10   Rows (20#) 3 x 15   Manual Therapy (    Soft Tissue Mobilization Duration  Duration: 30 Minutes): Manual techniques to facilitate improved motion and decreased pain.  (Used abbreviations: MET - muscle energy technique; PNF - proprioceptive neuromuscular facilitation; NMR - neuromuscular re-education; a/p - anterior to posterior; p/a - posterior to anterior)   · Joint mobilization: C0/C1 Grade V rotation in supine   · Joint mobilization: C1-C5 unilateral p/a in prone grade IV  · Joint mobilization: L first rib inferior glide Grade III in supine and Grade V in supine a/p  · Soft tissue mobilization: R upper trapezius, SCM, Scalene  · Soft tissue mobilization: dry needling: R splenius capitis, R middle scalene, R upper trapezius, R SCM        MedBridge Portal  Treatment/Session Summary:    · Response to Treatment:  R sided stiffness appears associated with the sternocleidomastoid and upper trapezius muscles today. Upper cervical spine mobility remains restricted this week. .     · Communication/Consultation:  None today  · Equipment provided today:  None today  · Recommendations/Intent for next treatment session: Next visit will focus on Manual therapy and dry needling.     Total Treatment Billable Duration:  45 minutes  PT Patient Time In/Time Out  Time In: 1130  Time Out: [address] , PT    Future Appointments   Date Time Provider Liane De La Torrei   12/5/2019 11:30 AM Gregory Duran PT [facility]   12/12/2019 11:30 AM Gregory Duran PT [facility]   12/19/2019 11:30 AM Gregory Duran PT [facility]   10/2/2020  9:00 AM [facility]

## 2019-12-05 ENCOUNTER — HOSPITAL ENCOUNTER (OUTPATIENT)
Dept: PHYSICAL THERAPY | Age: 69
Discharge: HOME | End: 2019-12-05
Payer: MEDICARE

## 2019-12-05 NOTE — PROGRESS NOTES
Lexii Albert  : 1950  Payor: Giovanna Fajardo / [place] / Product Type: Medicare /  [address]  at [facility], [address] [facility]  [phone]   [phone]          DATE: 2019    Patient cancelled appointment today due to schedule conflict. Will plan to follow up on next scheduled visit.     Monique Sahni, PT, DPT, OCS

## 2019-12-12 ENCOUNTER — HOSPITAL ENCOUNTER (OUTPATIENT)
Dept: PHYSICAL THERAPY | Age: 69
Discharge: HOME | End: 2019-12-12
Payer: MEDICARE

## 2019-12-12 PROCEDURE — 97140 MANUAL THERAPY 1/> REGIONS: CPT

## 2019-12-12 NOTE — PROGRESS NOTES
Khurram Sandoval  : 1950  [place]  [place] [address] at [address]  [phone]   [phone]      OUTPATIENT PHYSICAL THERAPY: Daily Treatment Note 2019  Visit Count:  64    ICD-10: Treatment Diagnosis: Cervicalgia [M54.2]; Headache [R51]  Precautions/Allergies:   Patient has no known allergies. MD Orders: Evaluate and Treat  POC 19 - 20 MEDICAL/REFERRING DIAGNOSIS:  Neck pain [M54.2]   DATE OF ONSET: 2018  REFERRING PHYSICIAN: Jossie Bryant MD  RETURN PHYSICIAN APPOINTMENT: TBD       Pre-treatment Symptoms/Complaints:  Pt. Notes missing last week due to illness. Pt. Reports overall pain remains improved except with forward head positions such as computer work or reading the news paper. Pt. Will get really stiff and painful with that movement. Pt. Can only stay 30 minutes today secondary to schedule conflict. Pain: Initial: Pain Intensity 1: 1 /10 Post Session:  1/10   Medications Last Reviewed:  2019  Updated Objective Findings:  None Today  TREATMENT:     Therapeutic Exercise: ( ):  Exercises per grid below to improve mobility and strength. Required minimal verbal and manual cues to promote proper body alignment, promote proper body posture and promote proper body mechanics. Progressed resistance, range and repetitions as indicated.      Date:  2019   Activity/Exercise Parameters   Chin tuck head lift --   Cervical rotation: supine, inclined, quadruped --   Upper trapezius/scalene stretch --   Foam roll Thoracic --   Pectoralis stretch --   Cervical extension (prone off table) --   Chin tuck with sit up --   T's and I's --   Chest press with chin tuck on therapy ball (10#) --   Rows (20#) --   Manual Therapy (    Soft Tissue Mobilization Duration  Duration: 30 Minutes): Manual techniques to facilitate improved motion and decreased pain. (Used abbreviations: MET - muscle energy technique; PNF - proprioceptive neuromuscular facilitation; NMR - neuromuscular re-education; a/p - anterior to posterior; p/a - posterior to anterior)   · Joint mobilization: C0/C1 Grade V rotation in supine   · Joint mobilization: C1-C5 unilateral p/a in prone grade IV  · Joint mobilization: L first rib inferior glide Grade III in supine and Grade V in supine a/p  · Soft tissue mobilization: R upper trapezius, SCM, Scalene  · Soft tissue mobilization: dry needling: R splenius capitis, R middle scalene, R upper trapezius, R SCM        MedBridge Portal  Treatment/Session Summary:    · Response to Treatment:  Moderate myofascial pain is present in the R SCM and upper trapezius today. Pt. demonstrates improved self management of symptoms over past 3 months and has been able to sustain longer periods without intervention. .     · Communication/Consultation:  None today  · Equipment provided today:  None today  · Recommendations/Intent for next treatment session: Next visit will focus on Manual therapy and dry needling.     Total Treatment Billable Duration:  30 minutes  PT Patient Time In/Time Out  Time In: 1130  Time Out: [address] [facility] [address], PT    Future Appointments   Date Time Provider Liane Burgos   12/19/2019  9:30 AM [facility]   12/24/2019  9:30 AM [facility]   10/2/2020  9:00 AM Kavon Nevarez MD [facility]

## 2019-12-19 ENCOUNTER — HOSPITAL ENCOUNTER (OUTPATIENT)
Dept: PHYSICAL THERAPY | Age: 69
Discharge: HOME | End: 2019-12-19
Payer: MEDICARE

## 2019-12-19 PROCEDURE — 97140 MANUAL THERAPY 1/> REGIONS: CPT

## 2019-12-19 NOTE — PROGRESS NOTES
Subha Raya  : 1950  [place]  [place]: [place] [address] at [place] [address], [address] [facility]  [phone]   [phone]      OUTPATIENT PHYSICAL THERAPY: Daily Treatment Note 2019  Visit Count:  62    ICD-10: Treatment Diagnosis: Cervicalgia [M54.2]; Headache [R51]  Precautions/Allergies:   Patient has no known allergies. MD Orders: Evaluate and Treat  POC 19 - 20 MEDICAL/REFERRING DIAGNOSIS:  Neck pain [M54.2]   DATE OF ONSET: 2018  REFERRING PHYSICIAN: Jaki Alegria MD  RETURN PHYSICIAN APPOINTMENT: TBD       Pre-treatment Symptoms/Complaints:  Pt. Reports absence of neck pain with mild tightness in the neck with cervical rotation. Pain: Initial: Pain Intensity 1: 1 /10 Post Session:  1/10   Medications Last Reviewed:  2019  Updated Objective Findings:  Bilateral cervical rotation 65 degrees  TREATMENT:     Therapeutic Exercise: ( ):  Exercises per grid below to improve mobility and strength. Required minimal verbal and manual cues to promote proper body alignment, promote proper body posture and promote proper body mechanics. Progressed resistance, range and repetitions as indicated. Date:  2019   Activity/Exercise Parameters   Chin tuck head lift --   Cervical rotation: supine, inclined, quadruped --   Upper trapezius/scalene stretch --   Foam roll Thoracic --   Pectoralis stretch --   Cervical extension (prone off table) --   Chin tuck with sit up --   T's and I's --   Chest press with chin tuck on therapy ball (10#) --   Rows (20#) --   Manual Therapy (    Soft Tissue Mobilization Duration  Duration: 30 Minutes): Manual techniques to facilitate improved motion and decreased pain.  (Used abbreviations: MET - muscle energy technique; PNF - proprioceptive neuromuscular facilitation; NMR - neuromuscular re-education; a/p - anterior to posterior; p/a - posterior to anterior)   · Joint mobilization: C0/C1 Grade V rotation in supine   · Joint mobilization: C1-C5 unilateral p/a in prone grade IV  · Joint mobilization: L first rib inferior glide Grade III in supine and Grade V in supine a/p  · Soft tissue mobilization: R upper trapezius, SCM, Scalene  · Soft tissue mobilization: dry needling: R splenius capitis, R middle scalene, R upper trapezius, R SCM        MedBridge Portal  Treatment/Session Summary:    · Response to Treatment:  Cervical ROM demonstrates prolonged improvements bilaterally with absence of pain. intermittent manual therapy and dry needling in combination with HEP continue to prevent full return of symptoms. .     · Communication/Consultation:  None today  · Equipment provided today:  None today  · Recommendations/Intent for next treatment session: Next visit will focus on Manual therapy and dry needling.     Total Treatment Billable Duration:  30 minutes  PT Patient Time In/Time Out  Time In: 0930  Time Out: 119 Kailee Montgomery, PT    Future Appointments   Date Time Provider Liane Hummelisti   12/24/2019  8:30 AM Le Hanley PT [facility]   10/2/2020  9:00 AM Jeff Chavez MD [facility] HTF HTF

## 2020-01-08 ENCOUNTER — HOSPITAL ENCOUNTER (OUTPATIENT)
Dept: PHYSICAL THERAPY | Age: 70
Discharge: HOME | End: 2020-01-08
Attending: FAMILY MEDICINE
Payer: MEDICARE

## 2020-01-08 DIAGNOSIS — M54.2 NECK PAIN: ICD-10-CM

## 2020-01-08 PROCEDURE — 97140 MANUAL THERAPY 1/> REGIONS: CPT

## 2020-01-08 PROCEDURE — 97110 THERAPEUTIC EXERCISES: CPT

## 2020-01-08 NOTE — THERAPY RECERTIFICATION
Tc Porter  : 1950  [place]  [place]: [place] [address] at [place] [address], [address] [facility]  [phone]   [phone]        OUTPATIENT PHYSICAL THERAPY:Recertification 8/3/9200   ICD-10: Treatment Diagnosis: Cervicalgia [M54.2]; Headache [R51]  Precautions/Allergies:   Patient has no known allergies. MD Orders: Evaluate and Treat MEDICAL/REFERRING DIAGNOSIS:  Neck pain [M54.2]   DATE OF ONSET: 2018  REFERRING PHYSICIAN: Kizzy Worthy MD  RETURN PHYSICIAN APPOINTMENT: TBD     INITIAL ASSESSMENT:  Mr. Michael Méndez presents with signs of R sided neck pain. Mobility restrictions are present in the upper cervical spine bilaterally with pain present during right sided cervical rotation. Pt. Demonstrates moderate myofascial pain additionally in the R splenius capitis, R sternocleidomastoid, and R scalene musculature. Pt. Will benefit from skilled PT including manual therapy, range of motion, and mobility exercises to address impairments identified. 18 Progress Report: Pt. Attends 10 physical therapy visits. Pt. Demonstrates improved cervical ROM in all directions. Despite improved cervical ROM, pain remains in the R posterior cervical region and R Sternocleidomastoid with cervical rotations movements bilaterally. Upper cervical joint mobility has improved but patient remains positive with the upper cervical flexion rotation test.  Pt. Will benefit from continued manual therapy and deep neck flexor strengthening to address remaining pain. 19 Progress Report: Improvements in cervical rotation and extension remain, but patient continues to experience pain during cervical rotation. PT suspects mild cervical dystonia symptoms resulting in antagonistic muscle contraction with cervical rotation.   Joint limitations remain in the upper cervical spine resulting in compensated lateral flexion during R sided cervical rotation. Pt. Jaswant Brush to benefit from physical therapy but may benefit from further alternative treatment additionally. 3/26/19 Progress Report: Re-assessment today is performed during an acute flare up of symptoms following a trip to [place]. Pt. Was making good progress toward pain free cervical ROM and advancement of cervical and scapular strengthening exercises. Due to flare up, subjective pain levels and functional outcome measures are worse at this time. Pt. Overall has improved since starting PT but again is encouraged to f/u with referring M.D. For further alternative treatment. Pt. Will benefit from continued PT to address pain and ADL deficits. 6/11/19 Progress Report: Cervical ROM remains improved since initial evaluation but subjective pain levels for R sided neck pain remain. Pt. Has made excellent progress with HEP for cervical strength/endurance exercises and is mostly independent at this time. Manual therapy provides consistent pain relief post session with varying lengths of improvement, but familiar pain continues to return. Pt. Will benefit from continuation of current program to address pain but may also benefit from alternative treatment to achieve remaining goals. 09/12/19 Progress Report/Recertification: Pt. Achieves longer duration of pain relief with subsequent physical therapy sessions. Pt. 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  Pt. Will benefit from a continuation of current PT program to maintain progress and promote a pain free cervical ROM for ADL's and recreational activities. 1/8/2020 Progress Report/Recertification: Subjective bilateral neck pain complaints remain improved over the past 3 months.   Scores from functional outcome measure nearly meet set goals indicating improved neck pain and function over the past year. Cervical rotation has improved since initial evaluation and patient has less trouble checking mirrors when driving. Compliance with HEP consisting of cervical flexor/extensor strengthening and scapular strengthening remains consistent. Manual therapy techniques provided by PT address neck pain symptoms and prevent regression of cervical ROM limitations. Pt. Will benefit from continued skilled PT to manage remaining symptoms, prevent return of ROM loss/pain, and improve ADL and recreational performance. PROBLEM LIST (Impacting functional limitations):  1. Decreased Strength  2. Increased Pain  3. Decreased Activity Tolerance  4. Decreased Flexibility/Joint Mobility  5. Decreased [place] with Home Exercise Program INTERVENTIONS PLANNED:  1. Home Exercise Program (HEP)  2. Manual Therapy  3. Range of Motion (ROM)  4. Therapeutic Exercise/Strengthening   TREATMENT PLAN:  Effective Dates: 01/01/2020 TO 03/11/20   Frequency/Duration: 2 times a week for 10 weeks (pt. Will likely attend 1x per week)  GOALS: (Goals have been discussed and agreed upon with patient.)  Discharge Goals: Time Frame: 6 weeks  1. Pt. Will demonstrate 60 degrees of cervical rotation bilaterally to improve driving safety. MET  2. Pt. Will demonstrate <3/10 R sided neck pain with 8 hours of sleep to improve pain. MET  3. Pt. Will score < 12% on the NDI to demonstrate improved pain and function. ONGOING  4. Pt. Will demonstrate <2/10 R sided neck pain with driving to improve safety. MET  5. Pt. Will be independent with HEP for discharge. ONGOING  Rehabilitation Potential For Stated Goals: Good     Outcome Measure: Tool Used: Neck Disability Index (NDI)  Score:  Initial: 11/50  Most Recent: 6/50 (Date: 1/8/19 )   Interpretation of Score: The Neck Disability Index is a revised form of the Oswestry Low Back Pain Index and is designed to measure the activities of daily living in person's with neck pain.   Each section is scored on a 0-5 scale, 5 representing the greatest disability. The scores of each section are added together for a total score of 50. UPDATED OBJECTIVE FINDINGS:        Palpation:  Upper Trapezius: -- Levator Scapulae:-- Posterior Cervical: tender on R side   Sternocleidomastoid: Tender on R side Scalenes: -- Other:       Flexibility: Limited suboccipitals, SCM, scalenes bilaterally      Range of Motion: Cervical in degrees  Flexion: 50                                       Extesnion: 45                       Right                     Left   Rotation 64 pain 67   Side Bend 20 18       Strength: Manual Muscle Test: upper quarter screen 5/5 MMT bilaterally. Cervical Flexion Endurance Test: --    Special Testing:  Spurlings Right: Left:   Upper limb Tension Test (ULTT1) -- --   Cervical Distraction -- --   Cervical flexion rotation test (+) (+)   Other:       Neuro: reflexes 2+ bilateral UE    Medical Necessity:   · Patient is expected to demonstrate progress in strength and range of motion to increase independence with sleeping, driving, and ADL's. Reason for Services/Other Comments:  · Patient will benefit from skilled PT to address impairments identified through evaluation and return to previous ADL and recreational capacity.   ·   Lexi Lilly, PT

## 2020-01-08 NOTE — PROGRESS NOTES
Stephani Mahoney  : 1950  [place]  [place]: [place] [address] at [place] [address], [address] [facility]  [phone]   [phone]      OUTPATIENT PHYSICAL THERAPY: Daily Treatment Note 2020  Visit Count:  62    ICD-10: Treatment Diagnosis: Cervicalgia [M54.2]; Headache [R51]  Precautions/Allergies:   Patient has no known allergies. MD Orders: Evaluate and Treat  POC 19 - 20 MEDICAL/REFERRING DIAGNOSIS:  Neck pain [M54.2]   DATE OF ONSET: 2018  REFERRING PHYSICIAN: Stepan Stokes MD  RETURN PHYSICIAN APPOINTMENT: TBD       Pre-treatment Symptoms/Complaints:  Pt. Notes continued improvement of neck pain. Mild flare up occurred earlier this week but has seemed to resolve. Pain: Initial: Pain Intensity 1: 1 /10 Post Session:  1/10   Medications Last Reviewed:  2020  Updated Objective Findings:  See evaluation note from today  TREATMENT:     Therapeutic Exercise: (15 Minutes):  Exercises per grid below to improve mobility and strength. Required minimal verbal and manual cues to promote proper body alignment, promote proper body posture and promote proper body mechanics. Progressed resistance, range and repetitions as indicated. Date:  2020   Activity/Exercise Parameters   Chin tuck head lift --   Cervical rotation: supine, inclined, quadruped --   Upper trapezius/scalene stretch 4 minutes   Foam roll Thoracic 4 minutes   Pectoralis stretch 4 minutes   Cervical extension (prone off table) --   Chin tuck with sit up --   T's and I's --   Chest press with chin tuck on therapy ball (10#) --   Rows (20#) 3 x 10   Manual Therapy (    Soft Tissue Mobilization Duration  Duration: 30 Minutes): Manual techniques to facilitate improved motion and decreased pain.  (Used abbreviations: MET - muscle energy technique; PNF - proprioceptive neuromuscular facilitation; NMR - neuromuscular re-education; a/p - anterior to posterior; p/a - posterior to anterior)   · Joint mobilization: C0/C1 Grade V rotation in supine   · Joint mobilization: C1-C5 unilateral p/a in prone grade IV  · Joint mobilization: L first rib inferior glide Grade III in supine and Grade V in supine a/p  · Soft tissue mobilization: R upper trapezius, SCM, Scalene  · Soft tissue mobilization: dry needling: R splenius capitis, R middle scalene, R upper trapezius, R SCM        MedBridge Portal  Treatment/Session Summary:    · Response to Treatment:  Pt. tolerates manual therapy today without complaints and minimal post treatment soreness. Pt. will benefit from continuation of current program to prevent return of symptoms. .     · Communication/Consultation:  None today  · Equipment provided today:  None today  · Recommendations/Intent for next treatment session: Next visit will focus on Manual therapy and dry needling.     Total Treatment Billable Duration:  45 minutes  PT Patient Time In/Time Out  Time In: 0730  Time Out: 0830  Krista Pastor, PT    Future Appointments   Date Time Provider Liane Burgos   1/15/2020 10:30 AM Carlos Sams, PT [facility]   1/22/2020 11:30 AM Carlos Latrell, PT [facility]   1/30/2020 11:30 AM Carlos Sams, PT [facility]   2/6/2020 11:30 AM Carlos Latrell, PT [facility]   2/20/2020 11:30 AM Carlos Sams, PT [facility]   2/27/2020 11:30 AM Carlos Sams, PT [facility]   3/5/2020 11:30 AM Carlos Sams, PT [facility]   10/2/2020  9:00 AM Luz Maria Espinal MD [facility] HTF HTF

## 2020-01-15 ENCOUNTER — HOSPITAL ENCOUNTER (OUTPATIENT)
Dept: PHYSICAL THERAPY | Age: 70
Discharge: HOME | End: 2020-01-15
Attending: FAMILY MEDICINE
Payer: MEDICARE

## 2020-01-15 NOTE — PROGRESS NOTES
Antonina Arechiga  : 1950  Payor: Francisco J Hale / [place] / Product Type: Medicare /  [address]  at [address]  [phone]   [phone]          DATE: 1/15/2020    Patient cancelled appointment today due to illness. Will plan to follow up on next scheduled visit.     Alexandr Lopez, PT, DPT, OCS

## 2020-01-21 ENCOUNTER — HOSPITAL ENCOUNTER (OUTPATIENT)
Dept: PHYSICAL THERAPY | Age: 70
Discharge: HOME | End: 2020-01-21
Attending: FAMILY MEDICINE
Payer: MEDICARE

## 2020-01-21 PROCEDURE — 97140 MANUAL THERAPY 1/> REGIONS: CPT

## 2020-01-21 PROCEDURE — 97110 THERAPEUTIC EXERCISES: CPT

## 2020-01-21 NOTE — PROGRESS NOTES
Mariela Olivares  : 1950  [place]  [place] [address] at [address]  [phone]   [phone]      OUTPATIENT PHYSICAL THERAPY: Daily Treatment Note 2020  Visit Count:  61    ICD-10: Treatment Diagnosis: Cervicalgia [M54.2]; Headache [R51]  Precautions/Allergies:   Patient has no known allergies. MD Orders: Evaluate and Treat  POC 20-3/11/2020 MEDICAL/REFERRING DIAGNOSIS:  Neck pain [M54.2]   DATE OF ONSET: 2018  REFERRING PHYSICIAN: Aleksander Ward MD  RETURN PHYSICIAN APPOINTMENT: TBD       Pre-treatment Symptoms/Complaints:  Pt. Reports another brief flare up of R sided neck pain that was resolved by taking an alleve last week. Pain: Initial: Pain Intensity 1: 1 /10 Post Session:  1/10   Medications Last Reviewed:  2020  Updated Objective Findings:  None Today  TREATMENT:     Therapeutic Exercise: (15 Minutes):  Exercises per grid below to improve mobility and strength. Required minimal verbal and manual cues to promote proper body alignment, promote proper body posture and promote proper body mechanics. Progressed resistance, range and repetitions as indicated. Date:  2020   Activity/Exercise Parameters   Chin tuck head lift --   Cervical rotation: supine, inclined, quadruped --   Upper trapezius/scalene stretch 4 minutes   Foam roll Thoracic 4 minutes   Pectoralis stretch 4 minutes   Cervical extension (prone off table) --   Chin tuck with sit up --   T's and I's 3 x 10   Chest press with chin tuck on therapy ball (10#) --   Rows (20#) --   Manual Therapy (    Soft Tissue Mobilization Duration  Duration: 30 Minutes): Manual techniques to facilitate improved motion and decreased pain.  (Used abbreviations: MET - muscle energy technique; PNF - proprioceptive neuromuscular facilitation; NMR - neuromuscular re-education; a/p - anterior to posterior; p/a - posterior to anterior)   · Joint mobilization: C0/C1 Grade V rotation in supine   · Joint mobilization: C1-C5 unilateral p/a in prone grade IV  · Joint mobilization: L first rib inferior glide Grade III in supine and Grade V in supine a/p  · Soft tissue mobilization: R upper trapezius, SCM, Scalene  · Soft tissue mobilization: dry needling: R splenius capitis, R middle scalene, R upper trapezius, R SCM        MedBridge Portal  Treatment/Session Summary:    · Response to Treatment:  Upper cervical mobility remains restricted but cervical rotation ROM does not regress. Pt. is tender to palpation of the R SCM and splenius capitis today. .     · Communication/Consultation:  None today  · Equipment provided today:  None today  · Recommendations/Intent for next treatment session: Next visit will focus on Manual therapy and dry needling.     Total Treatment Billable Duration:  45 minutes  PT Patient Time In/Time Out  Time In: 0830  Time Out: [address] [facility],     Future Appointments   Date Time Provider Liane Burgos   1/30/2020 11:30 AM Zafar Araujo, PT [facility]   2/6/2020 11:30 AM Zafar Araujo, PT [facility]   2/20/2020 11:30 AM Zafar Araujo, PT [facility]   2/27/2020 11:30 AM Zafar Araujo, PT [facility]   3/5/2020 11:30 AM Zafar Araujo, PT [facility]   10/2/2020  9:00 AM Harley Moore MD [facility] HTF HTF

## 2020-01-30 ENCOUNTER — HOSPITAL ENCOUNTER (OUTPATIENT)
Dept: PHYSICAL THERAPY | Age: 70
Discharge: HOME | End: 2020-01-30
Attending: FAMILY MEDICINE
Payer: MEDICARE

## 2020-01-30 PROCEDURE — 97140 MANUAL THERAPY 1/> REGIONS: CPT

## 2020-01-30 PROCEDURE — 97110 THERAPEUTIC EXERCISES: CPT

## 2020-01-30 NOTE — PROGRESS NOTES
Talia Godoy  : 1950  [place]  Secondary: [facility] at [facility], [address] [facility]  [phone]   [phone]      OUTPATIENT PHYSICAL THERAPY: Daily Treatment Note 2020  Visit Count:  60    ICD-10: Treatment Diagnosis: Cervicalgia [M54.2]; Headache [R51]  Precautions/Allergies:   Patient has no known allergies. MD Orders: Evaluate and Treat  POC 20-3/11/2020 MEDICAL/REFERRING DIAGNOSIS:  Neck pain [M54.2]   DATE OF ONSET: 2018  REFERRING PHYSICIAN: Mitzy Thorpe MD  RETURN PHYSICIAN APPOINTMENT: TBD       Pre-treatment Symptoms/Complaints:  Pt. Notes Increased strength and cardio training at the gym over the past week. Pt. Report mild B neck ache from muscle soreness. Pain: Initial: Pain Intensity 1: 1 /10 Post Session:  1/10   Medications Last Reviewed:  2020  Updated Objective Findings:  Cervical flexion rotation test (+) bilaterally  TREATMENT:     Therapeutic Exercise: (15 Minutes):  Exercises per grid below to improve mobility and strength. Required minimal verbal and manual cues to promote proper body alignment, promote proper body posture and promote proper body mechanics. Progressed resistance, range and repetitions as indicated. Date:  2020   Activity/Exercise Parameters   Chin tuck head lift --   Cervical rotation: supine, inclined, quadruped --   Upper trapezius/scalene stretch 4 minutes   Foam roll Thoracic 4 minutes   Pectoralis stretch 4 minutes   Cervical extension (prone off table) --   Chin tuck with sit up --   T's and I's    Chest press with chin tuck on therapy ball (10#) --   Rows (20#) 3 x 10   Manual Therapy (    Soft Tissue Mobilization Duration  Duration: 30 Minutes): Manual techniques to facilitate improved motion and decreased pain.  (Used abbreviations: MET - muscle energy technique; PNF - proprioceptive neuromuscular facilitation; NMR - neuromuscular re-education; a/p - anterior to posterior; p/a - posterior to anterior)   · Joint mobilization: C0/C1 Grade V rotation in supine   · Joint mobilization: C1-C5 unilateral p/a in prone grade IV  · Joint mobilization: L first rib inferior glide Grade III in supine and Grade V in supine a/p  · Soft tissue mobilization: R upper trapezius, SCM, Scalene  · Soft tissue mobilization: dry needling: R splenius capitis, R middle scalene, B upper trapezius, R SCM        MedBridge Portal  Treatment/Session Summary:    · Response to Treatment:  Pt. tolerates manual therapy today without complaints. Moderate trigger points present in the R SCM today. .     · Communication/Consultation:  None today  · Equipment provided today:  None today  · Recommendations/Intent for next treatment session: Next visit will focus on Manual therapy and dry needling.     Total Treatment Billable Duration:  45 minutes  PT Patient Time In/Time Out  Time In: 1130  Time Out: [address], PT    Future Appointments   Date Time Provider Liane Burgos   2/6/2020 11:30 AM Tatyana Franklin PT [facility]   2/20/2020 11:30 AM Tatyana Franklin, PT [facility]   2/27/2020 11:30 AM Tatyana Franklin PT [facility]   3/5/2020 11:30 AM Tatyana Franklin, PT [facility]   10/2/2020  9:00 AM Leidy Freeman MD [facility] HTF HTF

## 2020-02-06 ENCOUNTER — HOSPITAL ENCOUNTER (OUTPATIENT)
Dept: PHYSICAL THERAPY | Age: 70
Discharge: HOME | End: 2020-02-06
Payer: MEDICARE

## 2020-02-06 NOTE — PROGRESS NOTES
Skylar Dickerson  : 1950  Payor: Kimberly Dejesus / [place] / Product Type: Medicare /  [facility] [place]  at [address]  [phone]   [phone]          DATE: 2020    Patient cancelled appointment today due to inclement weather . Will plan to follow up on next scheduled visit.     Arin Yu, PT, DPT, OCS

## 2020-02-13 ENCOUNTER — APPOINTMENT (OUTPATIENT)
Dept: PHYSICAL THERAPY | Age: 70
End: 2020-02-13
Payer: MEDICARE

## 2020-02-20 ENCOUNTER — HOSPITAL ENCOUNTER (OUTPATIENT)
Dept: PHYSICAL THERAPY | Age: 70
Discharge: HOME | End: 2020-02-20
Payer: MEDICARE

## 2020-02-20 PROCEDURE — 97110 THERAPEUTIC EXERCISES: CPT

## 2020-02-20 PROCEDURE — 97140 MANUAL THERAPY 1/> REGIONS: CPT

## 2020-02-20 NOTE — PROGRESS NOTES
Tana Bowman  : 1950  [place]  Secondary: [facility], [address] [facility]  [phone]   [phone]      OUTPATIENT PHYSICAL THERAPY: Daily Treatment Note 2020  Visit Count:  61    ICD-10: Treatment Diagnosis: Cervicalgia [M54.2]; Headache [R51]  Precautions/Allergies:   Patient has no known allergies. MD Orders: Evaluate and Treat  POC 20-3/11/2020 MEDICAL/REFERRING DIAGNOSIS:  Neck pain [M54.2]   DATE OF ONSET: 2018  REFERRING PHYSICIAN: Roberto Carlos Fowler MD  RETURN PHYSICIAN APPOINTMENT: TBD       Pre-treatment Symptoms/Complaints:  Pt. Reports mild neck stiffness while on vacation last week. Otherwise, neck pain has remained reduced. Pain: Initial: Pain Intensity 1: 1 /10 Post Session:  1/10   Medications Last Reviewed:  2020  Updated Objective Findings:  None Today  TREATMENT:     Therapeutic Exercise: (15 Minutes):  Exercises per grid below to improve mobility and strength. Required minimal verbal and manual cues to promote proper body alignment, promote proper body posture and promote proper body mechanics. Progressed resistance, range and repetitions as indicated. Date:  2020   Activity/Exercise Parameters   Chin tuck head lift --   Cervical rotation: supine, inclined, quadruped --   Upper trapezius/scalene stretch 4 minutes   Foam roll Thoracic 4 minutes   Pectoralis stretch 4 minutes   Cervical extension (prone off table) --   Chin tuck with sit up --   T's and I's 3 x 15   Chest press with chin tuck on therapy ball (10#) --   Rows (20#) --   Manual Therapy (    Soft Tissue Mobilization Duration  Duration: 30 Minutes): Manual techniques to facilitate improved motion and decreased pain.  (Used abbreviations: MET - muscle energy technique; PNF - proprioceptive neuromuscular facilitation; NMR - neuromuscular re-education; a/p - anterior to posterior; p/a - posterior to anterior)   · Joint mobilization: C0/C1 Grade V rotation in supine   · Joint mobilization: C1-C5 unilateral p/a in prone grade IV  · Joint mobilization: L first rib inferior glide Grade III in supine and Grade V in supine a/p  · Soft tissue mobilization: R upper trapezius, SCM, Scalene, posterior cervical musculature. · Soft tissue mobilization: dry needling: R splenius capitis, R middle scalene, B upper trapezius, R SCM  (NOT PERFORMED)      MedBridge Portal  Treatment/Session Summary:    · Response to Treatment:  Pt. demonstrates muscular restriction in the upper cervical region today, but sternocliedomastoid and posterior cervical musculature are improved. Dry needling techniques are not required today. .     · Communication/Consultation:  None today  · Equipment provided today:  None today  · Recommendations/Intent for next treatment session: Next visit will focus on Manual therapy and dry needling.     Total Treatment Billable Duration:  45 minutes  PT Patient Time In/Time Out  Time In: 1130  Time Out: [address] , PT    Future Appointments   Date Time Provider Liane Loretta   2/27/2020 11:30 AM Hermann Vazquez PT [facility]   3/5/2020 11:30 AM Hermann Vazquez PT [facility]   3/19/2020 11:30 AM [facility]   10/2/2020  9:00 AM [facility]

## 2020-02-27 ENCOUNTER — APPOINTMENT (OUTPATIENT)
Dept: PHYSICAL THERAPY | Age: 70
End: 2020-02-27
Payer: MEDICARE

## 2020-03-05 ENCOUNTER — HOSPITAL ENCOUNTER (OUTPATIENT)
Dept: PHYSICAL THERAPY | Age: 70
Discharge: HOME | End: 2020-03-05
Payer: MEDICARE

## 2020-03-05 PROCEDURE — 97110 THERAPEUTIC EXERCISES: CPT

## 2020-03-05 PROCEDURE — 97140 MANUAL THERAPY 1/> REGIONS: CPT

## 2020-03-05 NOTE — PROGRESS NOTES
Skylar Dickerson  : 1950  [place]  Secondary: [facility] at [place] [address], [address] [facility]  [phone]   [phone]      OUTPATIENT PHYSICAL THERAPY: Daily Treatment Note 3/5/2020  Visit Count:  58    ICD-10: Treatment Diagnosis: Cervicalgia [M54.2]; Headache [R51]  Precautions/Allergies:   Patient has no known allergies. MD Orders: Evaluate and Treat  POC 20-3/11/2020 MEDICAL/REFERRING DIAGNOSIS:  Neck pain [M54.2]   DATE OF ONSET: 2018  REFERRING PHYSICIAN: Kateryna Abad MD  RETURN PHYSICIAN APPOINTMENT: TBD       Pre-treatment Symptoms/Complaints:  Pt. Notes absence of neck pain but stiffness in the neck at times. Pain: Initial: Pain Intensity 1: 1 /10 Post Session:  1/10   Medications Last Reviewed:  3/5/2020  Updated Objective Findings:  R cervical rotation 72 degrees, L cervical rotation 66 degrees  TREATMENT:     Therapeutic Exercise: (15 Minutes):  Exercises per grid below to improve mobility and strength. Required minimal verbal and manual cues to promote proper body alignment, promote proper body posture and promote proper body mechanics. Progressed resistance, range and repetitions as indicated. Date:  3/5/2020   Activity/Exercise Parameters   Chin tuck head lift --   Cervical rotation: supine, inclined, quadruped --   Upper trapezius/scalene stretch 4 minutes   Foam roll Thoracic 4 minutes   Pectoralis stretch 4 minutes   Cervical extension (prone off table) --   Chin tuck with sit up --   T's and I's    Chest press with chin tuck on therapy ball (10#) --   Rows (20#) 3 x 10   Manual Therapy (    Soft Tissue Mobilization Duration  Duration: 30 Minutes): Manual techniques to facilitate improved motion and decreased pain.  (Used abbreviations: MET - muscle energy technique; PNF - proprioceptive neuromuscular facilitation; NMR - neuromuscular re-education; a/p - anterior to posterior; p/a - posterior to anterior)   · Joint mobilization: C0/C1 Grade V rotation in supine   · Joint mobilization: C1-C5 unilateral p/a in prone grade IV  · Joint mobilization: L first rib inferior glide Grade III in supine and Grade V in supine a/p  · Soft tissue mobilization: R upper trapezius, SCM, Scalene, posterior cervical musculature. · Soft tissue mobilization: dry needling: L upper trapezius, R SCM        MedBridge Portal  Treatment/Session Summary:    · Response to Treatment:  Cervical ROM remains improved and only mild myofascial tenderness is present in the L upper trapezius and R SCM. Manual therapy techniques are utilized to address impairments today. Pt. will extend time inbetween visits as pain and ROM continue to improve. .     · Communication/Consultation:  None today  · Equipment provided today:  None today  · Recommendations/Intent for next treatment session: Next visit will focus on Manual therapy and dry needling.     Total Treatment Billable Duration:  45 minutes  PT Patient Time In/Time Out  Time In: 1110  Time Out: [facility],     Future Appointments   Date Time Provider Liane Burgos   3/19/2020 11:30 AM Junious Jumbo, PT [facility]   4/9/2020  9:30 AM Junious Jumbo, PT [facility]   4/23/2020  9:30 AM Junious Jumbo, PT [facility]   5/7/2020  9:30 AM Junious Jumbo, PT [facility]   5/21/2020  9:30 AM Junious Jumbo, PT [facility]   10/2/2020  9:00 AM Kizzy Worthy MD [facility]

## 2020-03-19 ENCOUNTER — APPOINTMENT (OUTPATIENT)
Dept: PHYSICAL THERAPY | Age: 70
End: 2020-03-19
Payer: MEDICARE

## 2020-04-09 ENCOUNTER — APPOINTMENT (OUTPATIENT)
Dept: PHYSICAL THERAPY | Age: 70
End: 2020-04-09

## 2020-04-23 ENCOUNTER — APPOINTMENT (OUTPATIENT)
Dept: PHYSICAL THERAPY | Age: 70
End: 2020-04-23

## 2020-06-16 NOTE — THERAPY DISCHARGE
Hannah Porter  : 1950  [place]  [place] [address] [place] [address] [place] [address]  [phone]   [phone]        OUTPATIENT PHYSICAL THERAPY:Discontinuation Summary 3/5/2020   ICD-10: Treatment Diagnosis: Cervicalgia [M54.2]; Headache [R51]  Precautions/Allergies:   Patient has no known allergies. MD Orders: Evaluate and Treat MEDICAL/REFERRING DIAGNOSIS:  Neck pain [M54.2]   DATE OF ONSET: 2018  REFERRING PHYSICIAN: Eula Nevarez MD  RETURN PHYSICIAN APPOINTMENT: TBD     INITIAL ASSESSMENT:  Mr. Heron Ashton presents with signs of R sided neck pain. Mobility restrictions are present in the upper cervical spine bilaterally with pain present during right sided cervical rotation. Pt. Demonstrates moderate myofascial pain additionally in the R splenius capitis, R sternocleidomastoid, and R scalene musculature. Pt. Will benefit from skilled PT including manual therapy, range of motion, and mobility exercises to address impairments identified. 18 Progress Report: Pt. Attends 10 physical therapy visits. Pt. Demonstrates improved cervical ROM in all directions. Despite improved cervical ROM, pain remains in the R posterior cervical region and R Sternocleidomastoid with cervical rotations movements bilaterally. Upper cervical joint mobility has improved but patient remains positive with the upper cervical flexion rotation test.  Pt. Will benefit from continued manual therapy and deep neck flexor strengthening to address remaining pain. 19 Progress Report: Improvements in cervical rotation and extension remain, but patient continues to experience pain during cervical rotation. PT suspects mild cervical dystonia symptoms resulting in antagonistic muscle contraction with cervical rotation.   Joint limitations remain in the upper cervical spine resulting in compensated lateral flexion during R sided cervical rotation. Pt. Agustín Paez to benefit from physical therapy but may benefit from further alternative treatment additionally. 3/26/19 Progress Report: Re-assessment today is performed during an acute flare up of symptoms following a trip to [place], [address]. Pt. Was making good progress toward pain free cervical ROM and advancement of cervical and scapular strengthening exercises. Due to flare up, subjective pain levels and functional outcome measures are worse at this time. Pt. Overall has improved since starting PT but again is encouraged to f/u with referring M.D. For further alternative treatment. Pt. Will benefit from continued PT to address pain and ADL deficits. 6/11/19 Progress Report: Cervical ROM remains improved since initial evaluation but subjective pain levels for R sided neck pain remain. Pt. Has made excellent progress with HEP for cervical strength/endurance exercises and is mostly independent at this time. Manual therapy provides consistent pain relief post session with varying lengths of improvement, but familiar pain continues to return. Pt. Will benefit from continuation of current program to address pain but may also benefit from alternative treatment to achieve remaining goals. 09/12/19 Progress Report/Recertification: Pt. Achieves longer duration of pain relief with subsequent physical therapy sessions. Pt. 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  Pt. Will benefit from a continuation of current PT program to maintain progress and promote a pain free cervical ROM for ADL's and recreational activities. 1/8/2020 Progress Report/Recertification: Subjective bilateral neck pain complaints remain improved over the past 3 months.   Scores from functional outcome measure nearly meet set goals indicating improved neck pain and function over the past year. Cervical rotation has improved since initial evaluation and patient has less trouble checking mirrors when driving. Compliance with HEP consisting of cervical flexor/extensor strengthening and scapular strengthening remains consistent. Manual therapy techniques provided by PT address neck pain symptoms and prevent regression of cervical ROM limitations. Pt. Will benefit from continued skilled PT to manage remaining symptoms, prevent return of ROM loss/pain, and improve ADL and recreational performance. 6/16/20 Discontinuation Summary: Lucille Petit has been seen in physical therapy from 11/9/18 to 3/5/20 for 62 visits. Treatment has been discontinued at this time due to clinic closure due to COVID-19 and patient symptoms are in control with HEP. .  The below goals were met prior to discontinuation. Some goals were not met due to end of treatment. Thank you for this referral.           PROBLEM LIST (Impacting functional limitations):  1. Decreased Strength  2. Increased Pain  3. Decreased Activity Tolerance  4. Decreased Flexibility/Joint Mobility  5. Decreased [place] with Home Exercise Program INTERVENTIONS PLANNED:  1. Home Exercise Program (HEP)  2. Manual Therapy  3. Range of Motion (ROM)  4. Therapeutic Exercise/Strengthening   TREATMENT PLAN:  Effective Dates: 01/01/2020 TO 03/11/20   Frequency/Duration: 2 times a week for 10 weeks (pt. Will likely attend 1x per week)  GOALS: (Goals have been discussed and agreed upon with patient.)  Discharge Goals: Time Frame: 6 weeks  1. Pt. Will demonstrate 60 degrees of cervical rotation bilaterally to improve driving safety. MET  2. Pt. Will demonstrate <3/10 R sided neck pain with 8 hours of sleep to improve pain. MET  3. Pt. Will score < 12% on the NDI to demonstrate improved pain and function. NOT MET  4. Pt. Will demonstrate <2/10 R sided neck pain with driving to improve safety. MET  5. Pt.  Will be independent with HEP for discharge. MET  Rehabilitation Potential For Stated Goals: Good     Outcome Measure: Tool Used: Neck Disability Index (NDI)  Score:  Initial: 11/50  Most Recent: 6/50 (Date: 1/8/19 )   Interpretation of Score: The Neck Disability Index is a revised form of the Oswestry Low Back Pain Index and is designed to measure the activities of daily living in person's with neck pain. Each section is scored on a 0-5 scale, 5 representing the greatest disability. The scores of each section are added together for a total score of 50.        Romana Horton, PT

## 2020-10-02 PROBLEM — E66.01 SEVERE OBESITY (HCC): Status: ACTIVE | Noted: 2020-10-02

## 2020-11-04 ENCOUNTER — HOSPITAL ENCOUNTER (OUTPATIENT)
Dept: SURGERY | Age: 70
Discharge: HOME | End: 2020-11-04

## 2020-11-04 VITALS — WEIGHT: 250 LBS | BODY MASS INDEX: 33.86 KG/M2 | HEIGHT: 72 IN

## 2020-11-04 NOTE — PERIOP NOTES
Patient verified name, , and procedure. Type: 1a; abbreviated assessment per anesthesia guidelines    Labs per anesthesia: None. Instructed pt that they will be notified the day before their procedure by the GI Lab for time of arrival if their procedure is [facility] and Pre-op for [place] cases. Arrival times should be called by 5 pm. If no phone is received the patient should contact their respective hospital. The GI lab telephone number is [phone] and ES Pre-op is [phone]. Follow diet and prep instructions per office including NPO status. If patient has NOT received instructions from office patient is advised to call surgeon office, verbalizes understanding. Bath or shower the night before and the am of surgery with non-mositurizing soap. No lotions, oils, powders, cologne on skin. No make up, eye make up or jewelry. Wear loose fitting comfortable, clean clothing. Must have adult present in building the entire time . Medications for the day of procedure NONE, patient to hold all vitamins, supplements, herbals 7 days prior to procedure and NSAIDs/ASA 5 days prior to procedure. The following discharge instructions reviewed with patient: medication given during procedure may cause drowsiness for several hours, therefore, do not drive or operate machinery for remainder of the day. You may not drink alcohol on the day of your procedure, please resume regular diet and activity unless otherwise directed. You may experience abdominal distention for several hours that is relieved by the passage of gas. Contact your physician if you have any of the following: fever or chills, severe abdominal pain or excessive amount of bleeding or a large amount when having a bowel movement.  Occasional specks of blood with bowel movement would not be unusual.

## 2020-11-09 ENCOUNTER — ANESTHESIA EVENT (OUTPATIENT)
Dept: ENDOSCOPY | Age: 70
End: 2020-11-09
Payer: MEDICARE

## 2020-11-10 ENCOUNTER — ANESTHESIA (OUTPATIENT)
Dept: ENDOSCOPY | Age: 70
End: 2020-11-10
Payer: MEDICARE

## 2020-11-10 ENCOUNTER — HOSPITAL ENCOUNTER (OUTPATIENT)
Age: 70
Setting detail: OUTPATIENT SURGERY
Discharge: HOME | End: 2020-11-10
Attending: SURGERY | Admitting: SURGERY
Payer: MEDICARE

## 2020-11-10 VITALS
SYSTOLIC BLOOD PRESSURE: 138 MMHG | HEIGHT: 72 IN | BODY MASS INDEX: 34.19 KG/M2 | DIASTOLIC BLOOD PRESSURE: 72 MMHG | HEART RATE: 66 BPM | WEIGHT: 252.4 LBS | OXYGEN SATURATION: 95 % | TEMPERATURE: 97.4 F | RESPIRATION RATE: 16 BRPM

## 2020-11-10 DIAGNOSIS — Z86.010 PERSONAL HISTORY OF COLONIC POLYPS: ICD-10-CM

## 2020-11-10 PROCEDURE — G0121 COLON CA SCRN NOT HI RSK IND: HCPCS | Performed by: SURGERY

## 2020-11-10 PROCEDURE — 2709999900 HC NON-CHARGEABLE SUPPLY: Performed by: SURGERY

## 2020-11-10 PROCEDURE — 74011250636 HC RX REV CODE- 250/636: Performed by: NURSE ANESTHETIST, CERTIFIED REGISTERED

## 2020-11-10 PROCEDURE — 74011000250 HC RX REV CODE- 250: Performed by: NURSE ANESTHETIST, CERTIFIED REGISTERED

## 2020-11-10 PROCEDURE — 74011250636 HC RX REV CODE- 250/636: Performed by: ANESTHESIOLOGY

## 2020-11-10 PROCEDURE — 76040000025: Performed by: SURGERY

## 2020-11-10 PROCEDURE — 76060000031 HC ANESTHESIA FIRST 0.5 HR: Performed by: SURGERY

## 2020-11-10 RX ORDER — PROPOFOL 10 MG/ML
INJECTION, EMULSION INTRAVENOUS
Status: DISCONTINUED | OUTPATIENT
Start: 2020-11-10 | End: 2020-11-10 | Stop reason: HOSPADM

## 2020-11-10 RX ORDER — PROPOFOL 10 MG/ML
INJECTION, EMULSION INTRAVENOUS AS NEEDED
Status: DISCONTINUED | OUTPATIENT
Start: 2020-11-10 | End: 2020-11-10 | Stop reason: HOSPADM

## 2020-11-10 RX ORDER — LIDOCAINE HYDROCHLORIDE 20 MG/ML
INJECTION, SOLUTION EPIDURAL; INFILTRATION; INTRACAUDAL; PERINEURAL AS NEEDED
Status: DISCONTINUED | OUTPATIENT
Start: 2020-11-10 | End: 2020-11-10 | Stop reason: HOSPADM

## 2020-11-10 RX ORDER — SODIUM CHLORIDE, SODIUM LACTATE, POTASSIUM CHLORIDE, CALCIUM CHLORIDE 600; 310; 30; 20 MG/100ML; MG/100ML; MG/100ML; MG/100ML
100 INJECTION, SOLUTION INTRAVENOUS CONTINUOUS
Status: DISCONTINUED | OUTPATIENT
Start: 2020-11-10 | End: 2020-11-10 | Stop reason: HOSPADM

## 2020-11-10 RX ADMIN — SODIUM CHLORIDE, SODIUM LACTATE, POTASSIUM CHLORIDE, AND CALCIUM CHLORIDE 100 ML/HR: 600; 310; 30; 20 INJECTION, SOLUTION INTRAVENOUS at 06:45

## 2020-11-10 RX ADMIN — LIDOCAINE HYDROCHLORIDE 100 MG: 20 INJECTION, SOLUTION EPIDURAL; INFILTRATION; INTRACAUDAL; PERINEURAL at 07:49

## 2020-11-10 RX ADMIN — PROPOFOL 50 MG: 10 INJECTION, EMULSION INTRAVENOUS at 07:49

## 2020-11-10 RX ADMIN — PROPOFOL 180 MCG/KG/MIN: 10 INJECTION, EMULSION INTRAVENOUS at 07:49

## 2020-11-10 NOTE — ANESTHESIA PREPROCEDURE EVALUATION
Anesthetic History   No history of anesthetic complications            Review of Systems / Medical History  Patient summary reviewed and pertinent labs reviewed    Pulmonary  Within defined limits                 Neuro/Psych   Within defined limits           Cardiovascular              Hyperlipidemia    Exercise tolerance: >4 METS  Comments: '14 Bradycardia - HR 48 Preop  Denies CP, SOB, Palps, Syncope, Fatigue   GI/Hepatic/Renal  Within defined limits              Endo/Other        Obesity and arthritis     Other Findings              Physical Exam    Airway  Mallampati: II  TM Distance: 4 - 6 cm  Neck ROM: normal range of motion   Mouth opening: Normal     Cardiovascular    Rhythm: regular  Rate: normal      Pertinent negatives: No murmur   Dental  No notable dental hx       Pulmonary  Breath sounds clear to auscultation               Abdominal  GI exam deferred       Other Findings            Anesthetic Plan    ASA: 2  Anesthesia type: total IV anesthesia          Induction: Intravenous  Anesthetic plan and risks discussed with: Patient and Spouse

## 2020-11-10 NOTE — ANESTHESIA POSTPROCEDURE EVALUATION
Procedure(s):  COLONOSCOPY/ 35.    total IV anesthesia    Anesthesia Post Evaluation      Multimodal analgesia: multimodal analgesia used between 6 hours prior to anesthesia start to PACU discharge  Patient location during evaluation: bedside  Patient participation: complete - patient participated  Level of consciousness: awake and responsive to light touch  Pain management: adequate  Airway patency: patent  Anesthetic complications: no  Cardiovascular status: acceptable, hemodynamically stable, blood pressure returned to baseline and stable  Respiratory status: acceptable, unassisted, spontaneous ventilation and nonlabored ventilation  Hydration status: acceptable  Post anesthesia nausea and vomiting:  controlled      INITIAL Post-op Vital signs:   Vitals Value Taken Time   /63 11/10/2020  8:19 AM   Temp     Pulse 57 11/10/2020  8:19 AM   Resp 16 11/10/2020  8:19 AM   SpO2 95 % 11/10/2020  8:19 AM

## 2020-11-10 NOTE — H&P
Yamilka Mccormick    11/10/2020    Date of Admission:  11/10/2020      Subjective:     Patient is a 65 y.o.  male presents for screening colonoscopy. The patient reports no problems. The patient denies family history of colon cancer. The patient has had a colonoscopy in the past. His last colonoscopy was in 2014 and TA was removed from \"left colon\". Otherwise there is no reported rectal bleeding or melena. No changes in appetite or unusual wt loss. No abdominal pain or bloating. No reported changes in bowel habits. DIAGNOSIS  LEFT COLON POLYP: FRAGMENTS OF TUBULAR ADENOMA.  tls/9/12/2014  Electronically signed out on 9/12/2014 08:49 by MAGGY Correa M.D. Patient Active Problem List    Diagnosis Date Noted    Personal history of colonic polyps 11/10/2020    Severe obesity ([address] 10/02/2020    HLD (hyperlipidemia)      Past Medical History:   Diagnosis Date    Bradycardia     stress test 2014, have requested records    HLD (hyperlipidemia)     Osteoarthritis     Personal history of colonic polyps 2014    adenoma    Severe obesity ([address] 10/2/2020      Past Surgical History:   Procedure Laterality Date    ABDOMEN SURGERY PROC UNLISTED      HX COLONOSCOPY  9/10/14    Richard--two left TAs--5 year recall    HX GI  2007    I & D rectal cyst      Prior to Admission Medications   Prescriptions Last Dose Informant Patient Reported? Taking? TURMERIC, BULK, 11/3/2020 at Unknown time  Yes Yes   Sig: by Does Not Apply route. cyanocobalamin (VITAMIN B12) 500 mcg tablet 11/3/2020 at Unknown time  Yes Yes   Sig: Take 500 mcg by mouth daily. multivitamin (ONE A DAY) tablet 11/3/2020 at Unknown time  Yes Yes   Sig: Take 1 tablet by mouth daily. naproxen sodium (ALEVE) 220 mg cap 11/3/2020 at Unknown time  Yes Yes   Sig: Take  by mouth.       Facility-Administered Medications: None     No Known Allergies   Social History     Tobacco Use    Smoking status: Never Smoker    Smokeless tobacco: Never Used   Substance Use Topics    Alcohol use: Yes     Comment: socially      Social History     Social History Narrative    Not on file     Family History   Problem Relation Age of Onset    Other Mother         pseudoblastoma    Heart Disease Father         CAD/CABG    Asthma Father         Current Facility-Administered Medications   Medication Dose Route Frequency    lactated Ringers infusion  100 mL/hr IntraVENous CONTINUOUS    lactated Ringers infusion  100 mL/hr IntraVENous CONTINUOUS       Review of Systems  A comprehensive review of systems was negative except for that written in the HPI. Objective:     Vitals:    11/10/20 0629   BP: 131/82   Pulse: 77   Resp: 16   Temp: 97.4 °F (36.3 °C)   SpO2: 94%   Weight: 252 lb 6.4 oz (114.5 kg)   Height: 6' (1.829 m)     PHYSICAL EXAM   Physical Examination: General appearance - alert, well appearing, and in no distress  Mental status - alert, oriented to person, place, and time  Eyes - pupils equal and reactive, extraocular eye movements intact  Nose - normal and patent, no erythema, discharge or polyps  Neck - supple, no significant adenopathy  Chest - clear to auscultation, no wheezes, rales or rhonchi, symmetric air entry  Heart - normal rate, regular rhythm, normal S1, S2, no murmurs, rubs, clicks or gallops  Abdomen - soft, nontender, nondistended, no masses or organomegaly  Neurological - alert, oriented, normal speech, no focal findings or movement disorder noted  Musculoskeletal - no joint tenderness, deformity or swelling  Extremities - peripheral pulses normal, no pedal edema, no clubbing or cyanosis  Skin - normal coloration and turgor, no rashes, no suspicious skin lesions noted      No results for input(s): WBC, HGB, HCT, PLT, HGBEXT, HCTEXT, PLTEXT in the last 72 hours. No results for input(s): NA, K, CL, GLU, CO2, BUN, CREA, MG, PHOS, TROIQ, INR, BNPP, INREXT in the last 72 hours.     No lab exists for component: KEN  No results for input(s): PH, PCO2, PO2, HCO3 in the last 72 hours.     Assessment:     Hospital Problems  Date Reviewed: 6/16/2019          Codes Class Noted POA    Personal history of colonic polyps ICD-10-CM: Z86.010  ICD-9-CM: V12.72  11/10/2020 Unknown            Plan:   Screening colonoscopy        Hira Kang DO

## 2020-11-10 NOTE — DISCHARGE INSTRUCTIONS
Gastrointestinal Colonoscopy/Flexible Sigmoidoscopy - Lower Exam Discharge Instructions  1. Call Dr. Mynor Salmon at office for any problems or questions. 2. Contact the doctors office for follow up appointment as directed  3. Medication may cause drowsiness for several hours, therefore:  · Do not drive or operate machinery for reminder of the day. · No alcohol today. · Do not make any important or legal decisions for 24 hours. · Do not sign any legal documents for 24 hours. 4. Ordinarily, you may resume regular diet and activity after exam unless otherwise specified by your physician. 5. Because of air put into your colon during exam, you may experience some abdominal distension, relieved by the passage of gas, for several hours. 6. Contact your physician if you have any of the following:  · Excessive amount of bleeding - large amount when having a bowel movement. Occasional specks of blood with bowel movement would not be unusual.  · Severe abdominal pain  · Fever or Chills  7. Polyp Removal - follow these additional instructions  · Clear liquid diet for the next meal (jell-o, broth, clear drinks)  · Soft diet for 24 hours, then resume regular diet   · Take Metamucil - 1 tablespoon in juice every morning for 3 days  · No Aspirin, Advil, Aleve, Nuprin, Ibuprofen, or medications that contain these drugs for 2 weeks. Any additional instructions:  ***      Instructions given to Justin Nino and other family members.

## 2021-05-27 ENCOUNTER — APPOINTMENT (RX ONLY)
Dept: URBAN - METROPOLITAN AREA CLINIC 349 | Facility: CLINIC | Age: 71
Setting detail: DERMATOLOGY
End: 2021-05-27

## 2021-05-27 DIAGNOSIS — Z71.89 OTHER SPECIFIED COUNSELING: ICD-10-CM

## 2021-05-27 DIAGNOSIS — L82.0 INFLAMED SEBORRHEIC KERATOSIS: ICD-10-CM

## 2021-05-27 DIAGNOSIS — D22 MELANOCYTIC NEVI: ICD-10-CM

## 2021-05-27 DIAGNOSIS — L82.1 OTHER SEBORRHEIC KERATOSIS: ICD-10-CM

## 2021-05-27 DIAGNOSIS — L81.4 OTHER MELANIN HYPERPIGMENTATION: ICD-10-CM

## 2021-05-27 DIAGNOSIS — Z12.83 ENCOUNTER FOR SCREENING FOR MALIGNANT NEOPLASM OF SKIN: ICD-10-CM

## 2021-05-27 DIAGNOSIS — D36.1 BENIGN NEOPLASM OF PERIPHERAL NERVES AND AUTONOMIC NERVOUS SYSTEM: ICD-10-CM

## 2021-05-27 PROBLEM — D36.14 BENIGN NEOPLASM OF PERIPHERAL NERVES AND AUTONOMIC NERVOUS SYSTEM OF THORAX: Status: ACTIVE | Noted: 2021-05-27

## 2021-05-27 PROBLEM — M12.9 ARTHROPATHY, UNSPECIFIED: Status: ACTIVE | Noted: 2021-05-27

## 2021-05-27 PROBLEM — D22.5 MELANOCYTIC NEVI OF TRUNK: Status: ACTIVE | Noted: 2021-05-27

## 2021-05-27 PROBLEM — D36.12 BENIGN NEOPLASM OF PERIPHERAL NERVES AND AUTONOMIC NERVOUS SYSTEM, UPPER LIMB, INCLUDING SHOULDER: Status: ACTIVE | Noted: 2021-05-27

## 2021-05-27 PROCEDURE — 17110 DESTRUCTION B9 LES UP TO 14: CPT

## 2021-05-27 PROCEDURE — 99203 OFFICE O/P NEW LOW 30 MIN: CPT | Mod: 25

## 2021-05-27 PROCEDURE — ? LIQUID NITROGEN

## 2021-05-27 PROCEDURE — ? COUNSELING

## 2021-05-27 ASSESSMENT — LOCATION ZONE DERM
LOCATION ZONE: NECK
LOCATION ZONE: FACE
LOCATION ZONE: ARM
LOCATION ZONE: TRUNK

## 2021-05-27 ASSESSMENT — LOCATION SIMPLE DESCRIPTION DERM
LOCATION SIMPLE: LEFT CHEEK
LOCATION SIMPLE: CHEST
LOCATION SIMPLE: RIGHT UPPER ARM
LOCATION SIMPLE: LEFT UPPER BACK
LOCATION SIMPLE: NECK

## 2021-05-27 ASSESSMENT — LOCATION DETAILED DESCRIPTION DERM
LOCATION DETAILED: LEFT INFERIOR LATERAL UPPER BACK
LOCATION DETAILED: LEFT CENTRAL LATERAL NECK
LOCATION DETAILED: RIGHT MEDIAL SUPERIOR CHEST
LOCATION DETAILED: RIGHT ANTERIOR PROXIMAL UPPER ARM
LOCATION DETAILED: LEFT SUPERIOR MEDIAL UPPER BACK
LOCATION DETAILED: LEFT SUPERIOR UPPER BACK
LOCATION DETAILED: LEFT SUPERIOR MEDIAL MALAR CHEEK
LOCATION DETAILED: LEFT MID-UPPER BACK

## 2021-05-27 NOTE — PROCEDURE: LIQUID NITROGEN
Number Of Freeze-Thaw Cycles: 3 freeze-thaw cycles
Post-Care Instructions: I reviewed with the patient in detail post-care instructions. Patient is to wear sunprotection, and avoid picking at any of the treated lesions. Pt may apply Vaseline to crusted or scabbing areas.
Consent: The patient's consent was obtained including but not limited to risks of crusting, scabbing, blistering, scarring, darker or lighter pigmentary change, recurrence, incomplete removal and infection.
Render Note In Bullet Format When Appropriate: No
Medical Necessity Clause: This procedure was medically necessary because the lesions that were treated were:
Medical Necessity Information: It is in your best interest to select a reason for this procedure from the list below. All of these items fulfill various CMS LCD requirements except the new and changing color options.
Detail Level: Detailed
Include Z78.9 (Other Specified Conditions Influencing Health Status) As An Associated Diagnosis?: Yes
Duration Of Freeze Thaw-Cycle (Seconds): 2

## 2022-03-18 PROBLEM — E66.01 SEVERE OBESITY (HCC): Status: ACTIVE | Noted: 2020-10-02

## 2022-03-20 PROBLEM — Z86.010 PERSONAL HISTORY OF COLONIC POLYPS: Status: ACTIVE | Noted: 2020-11-10

## 2022-03-20 PROBLEM — Z86.0100 PERSONAL HISTORY OF COLONIC POLYPS: Status: ACTIVE | Noted: 2020-11-10

## 2022-11-18 ENCOUNTER — OFFICE VISIT (OUTPATIENT)
Dept: FAMILY MEDICINE CLINIC | Facility: CLINIC | Age: 72
End: 2022-11-18
Payer: MEDICARE

## 2022-11-18 VITALS
BODY MASS INDEX: 33.8 KG/M2 | HEART RATE: 54 BPM | HEIGHT: 73 IN | TEMPERATURE: 97.2 F | OXYGEN SATURATION: 98 % | DIASTOLIC BLOOD PRESSURE: 72 MMHG | SYSTOLIC BLOOD PRESSURE: 116 MMHG | WEIGHT: 255 LBS

## 2022-11-18 DIAGNOSIS — Z13.220 SCREENING FOR HYPERLIPIDEMIA: ICD-10-CM

## 2022-11-18 DIAGNOSIS — Z13.29 SCREENING FOR THYROID DISORDER: ICD-10-CM

## 2022-11-18 DIAGNOSIS — R97.20 ELEVATED PROSTATE SPECIFIC ANTIGEN (PSA): ICD-10-CM

## 2022-11-18 DIAGNOSIS — Z12.5 SCREENING PSA (PROSTATE SPECIFIC ANTIGEN): ICD-10-CM

## 2022-11-18 DIAGNOSIS — Z00.00 ENCOUNTER FOR GENERAL ADULT MEDICAL EXAMINATION WITHOUT ABNORMAL FINDINGS: ICD-10-CM

## 2022-11-18 DIAGNOSIS — Z00.00 MEDICARE ANNUAL WELLNESS VISIT, SUBSEQUENT: Primary | ICD-10-CM

## 2022-11-18 LAB
ALBUMIN SERPL-MCNC: 3.8 G/DL (ref 3.2–4.6)
ALBUMIN/GLOB SERPL: 1.3 {RATIO} (ref 0.4–1.6)
ALP SERPL-CCNC: 57 U/L (ref 50–136)
ALT SERPL-CCNC: 26 U/L (ref 12–65)
ANION GAP SERPL CALC-SCNC: 1 MMOL/L (ref 2–11)
APPEARANCE UR: CLEAR
AST SERPL-CCNC: 13 U/L (ref 15–37)
BACTERIA URNS QL MICRO: NEGATIVE /HPF
BASOPHILS # BLD: 0.1 K/UL (ref 0–0.2)
BASOPHILS NFR BLD: 1 % (ref 0–2)
BILIRUB SERPL-MCNC: 0.7 MG/DL (ref 0.2–1.1)
BILIRUB UR QL: NEGATIVE
BUN SERPL-MCNC: 21 MG/DL (ref 8–23)
CALCIUM SERPL-MCNC: 9 MG/DL (ref 8.3–10.4)
CASTS URNS QL MICRO: ABNORMAL /LPF (ref 0–2)
CHLORIDE SERPL-SCNC: 111 MMOL/L (ref 101–110)
CHOLEST SERPL-MCNC: 232 MG/DL
CO2 SERPL-SCNC: 30 MMOL/L (ref 21–32)
COLOR UR: ABNORMAL
CREAT SERPL-MCNC: 1.1 MG/DL (ref 0.8–1.5)
DIFFERENTIAL METHOD BLD: NORMAL
EOSINOPHIL # BLD: 0.3 K/UL (ref 0–0.8)
EOSINOPHIL NFR BLD: 6 % (ref 0.5–7.8)
EPI CELLS #/AREA URNS HPF: ABNORMAL /HPF (ref 0–5)
ERYTHROCYTE [DISTWIDTH] IN BLOOD BY AUTOMATED COUNT: 12.6 % (ref 11.9–14.6)
GLOBULIN SER CALC-MCNC: 2.9 G/DL (ref 2.8–4.5)
GLUCOSE SERPL-MCNC: 106 MG/DL (ref 65–100)
GLUCOSE UR STRIP.AUTO-MCNC: NEGATIVE MG/DL
HCT VFR BLD AUTO: 43.4 % (ref 41.1–50.3)
HDLC SERPL-MCNC: 35 MG/DL (ref 40–60)
HDLC SERPL: 6.6 {RATIO}
HGB BLD-MCNC: 14.6 G/DL (ref 13.6–17.2)
HGB UR QL STRIP: NEGATIVE
IMM GRANULOCYTES # BLD AUTO: 0 K/UL (ref 0–0.5)
IMM GRANULOCYTES NFR BLD AUTO: 0 % (ref 0–5)
KETONES UR QL STRIP.AUTO: ABNORMAL MG/DL
LDLC SERPL CALC-MCNC: 172.8 MG/DL
LEUKOCYTE ESTERASE UR QL STRIP.AUTO: NEGATIVE
LYMPHOCYTES # BLD: 1.5 K/UL (ref 0.5–4.6)
LYMPHOCYTES NFR BLD: 26 % (ref 13–44)
MCH RBC QN AUTO: 32.5 PG (ref 26.1–32.9)
MCHC RBC AUTO-ENTMCNC: 33.6 G/DL (ref 31.4–35)
MCV RBC AUTO: 96.7 FL (ref 82–102)
MONOCYTES # BLD: 0.6 K/UL (ref 0.1–1.3)
MONOCYTES NFR BLD: 10 % (ref 4–12)
MUCOUS THREADS URNS QL MICRO: 0 /LPF
NEUTS SEG # BLD: 3.2 K/UL (ref 1.7–8.2)
NEUTS SEG NFR BLD: 57 % (ref 43–78)
NITRITE UR QL STRIP.AUTO: NEGATIVE
NRBC # BLD: 0 K/UL (ref 0–0.2)
PH UR STRIP: 5.5 [PH] (ref 5–9)
PLATELET # BLD AUTO: 209 K/UL (ref 150–450)
PMV BLD AUTO: 10.3 FL (ref 9.4–12.3)
POTASSIUM SERPL-SCNC: 4.2 MMOL/L (ref 3.5–5.1)
PROT SERPL-MCNC: 6.7 G/DL (ref 6.3–8.2)
PROT UR STRIP-MCNC: NEGATIVE MG/DL
PSA SERPL-MCNC: 2 NG/ML
RBC # BLD AUTO: 4.49 M/UL (ref 4.23–5.6)
RBC #/AREA URNS HPF: ABNORMAL /HPF (ref 0–5)
SODIUM SERPL-SCNC: 142 MMOL/L (ref 133–143)
SP GR UR REFRACTOMETRY: 1.02 (ref 1–1.02)
TRIGL SERPL-MCNC: 121 MG/DL (ref 35–150)
TSH, 3RD GENERATION: 3.31 UIU/ML (ref 0.36–3.74)
URINE CULTURE IF INDICATED: ABNORMAL
UROBILINOGEN UR QL STRIP.AUTO: 0.2 EU/DL (ref 0.2–1)
VLDLC SERPL CALC-MCNC: 24.2 MG/DL (ref 6–23)
WBC # BLD AUTO: 5.7 K/UL (ref 4.3–11.1)
WBC URNS QL MICRO: ABNORMAL /HPF (ref 0–4)

## 2022-11-18 PROCEDURE — G8484 FLU IMMUNIZE NO ADMIN: HCPCS | Performed by: FAMILY MEDICINE

## 2022-11-18 PROCEDURE — 3017F COLORECTAL CA SCREEN DOC REV: CPT | Performed by: FAMILY MEDICINE

## 2022-11-18 PROCEDURE — G0439 PPPS, SUBSEQ VISIT: HCPCS | Performed by: FAMILY MEDICINE

## 2022-11-18 PROCEDURE — 1123F ACP DISCUSS/DSCN MKR DOCD: CPT | Performed by: FAMILY MEDICINE

## 2022-11-18 ASSESSMENT — PATIENT HEALTH QUESTIONNAIRE - PHQ9
1. LITTLE INTEREST OR PLEASURE IN DOING THINGS: 0
SUM OF ALL RESPONSES TO PHQ QUESTIONS 1-9: 0
SUM OF ALL RESPONSES TO PHQ9 QUESTIONS 1 & 2: 0
2. FEELING DOWN, DEPRESSED OR HOPELESS: 0

## 2022-11-18 ASSESSMENT — LIFESTYLE VARIABLES
HOW MANY STANDARD DRINKS CONTAINING ALCOHOL DO YOU HAVE ON A TYPICAL DAY: PATIENT DOES NOT DRINK
HOW OFTEN DO YOU HAVE A DRINK CONTAINING ALCOHOL: MONTHLY OR LESS

## 2022-11-22 ENCOUNTER — TELEPHONE (OUTPATIENT)
Dept: FAMILY MEDICINE CLINIC | Facility: CLINIC | Age: 72
End: 2022-11-22

## 2022-11-22 NOTE — TELEPHONE ENCOUNTER
----- Message from Eric Aj MD sent at 11/20/2022  8:31 AM EST -----  Your recent lab results are normal.Labs great , they do not have MY CHART so call or letter

## 2022-11-29 NOTE — PATIENT INSTRUCTIONS
Personalized Preventive Plan for Holger Montano - 11/18/2022  Medicare offers a range of preventive health benefits. Some of the tests and screenings are paid in full while other may be subject to a deductible, co-insurance, and/or copay. Some of these benefits include a comprehensive review of your medical history including lifestyle, illnesses that may run in your family, and various assessments and screenings as appropriate. After reviewing your medical record and screening and assessments performed today your provider may have ordered immunizations, labs, imaging, and/or referrals for you. A list of these orders (if applicable) as well as your Preventive Care list are included within your After Visit Summary for your review. Other Preventive Recommendations:    A preventive eye exam performed by an eye specialist is recommended every 1-2 years to screen for glaucoma; cataracts, macular degeneration, and other eye disorders. A preventive dental visit is recommended every 6 months. Try to get at least 150 minutes of exercise per week or 10,000 steps per day on a pedometer . Order or download the FREE \"Exercise & Physical Activity: Your Everyday Guide\" from [employer]. Call 3-159.596.8601 or search [employer] online. You need 1751-4013 mg of calcium and 0189-3692 IU of vitamin D per day. It is possible to meet your calcium requirement with diet alone, but a vitamin D supplement is usually necessary to meet this goal.  When exposed to the sun, use a sunscreen that protects against both UVA and UVB radiation with an SPF of 30 or greater. Reapply every 2 to 3 hours or after sweating, drying off with a towel, or swimming. Always wear a seat belt when traveling in a car. Always wear a helmet when riding a bicycle or motorcycle.

## 2022-11-29 NOTE — PROGRESS NOTES
Medicare Annual Wellness Visit    [employer] is here for Medicare AWV    Assessment & Plan   Medicare annual wellness visit, subsequent  -     CBC with Auto Differential; Future  -     Comprehensive Metabolic Panel; Future  -     Lipid Panel; Future  -     Urinalysis with Reflex to Culture; Future  -     PSA Screening; Future  -     TSH; Future  Elevated prostate specific antigen (PSA)  -     PSA Screening; Future  Screening PSA (prostate specific antigen)  -     PSA Screening; Future  Screening for hyperlipidemia  -     Lipid Panel; Future  Encounter for general adult medical examination without abnormal findings  Screening for thyroid disorder    Recommendations for Preventive Services Due: see orders and patient instructions/AVS.  Recommended screening schedule for the next 5-10 years is provided to the patient in written form: see Patient Instructions/AVS.     No follow-ups on file. Subjective       Patient's complete Health Risk Assessment and screening values have been reviewed and are found in Flowsheets. The following problems were reviewed today and where indicated follow up appointments were made and/or referrals ordered. Positive Risk Factor Screenings with Interventions:              Health Habits/Nutrition:  Physical Activity: Insufficiently Active    Days of Exercise per Week: 3 days    Minutes of Exercise per Session: 40 min     Have you lost any weight without trying in the past 3 months?: No  Body mass index: (!) 33.64  Have you seen the dentist within the past year?: (!) No  Health Habits/Nutrition Interventions:      Hearing/Vision:  Do you or your family notice any trouble with your hearing that hasn't been managed with hearing aids?: No  Do you have difficulty driving, watching TV, or doing any of your daily activities because of your eyesight?: No  Have you had an eye exam within the past year?: (!) No  No results found.   Hearing/Vision Interventions:      Safety:  Do you have working smoke detectors?: Yes  Do you have any tripping hazards - loose or unsecured carpets or rugs?: No  Do you have any tripping hazards - clutter in doorways, halls, or stairs?: No  Do you have either shower bars, grab bars, non-slip mats or non-slip surfaces in your shower or bathtub?: (!) No  Do all of your stairways have a railing or banister?: (!) No  Do you always fasten your seatbelt when you are in a car?: Yes  Safety Interventions:             Objective   Vitals:    11/18/22 0802   BP: 116/72   Pulse: 54   Temp: 97.2 °F (36.2 °C)   TempSrc: Temporal   SpO2: 98%   Weight: 255 lb (115.7 kg)   Height: 6' 1\" (1.854 m)      Body mass index is 33.64 kg/m².       General Appearance: alert and oriented to person, place and time, well developed and well- nourished, in no acute distress  Skin: warm and dry, no rash or erythema  Head: normocephalic and atraumatic  Eyes: pupils equal, round, and reactive to light, extraocular eye movements intact, conjunctivae normal  ENT: tympanic membrane, external ear and ear canal normal bilaterally, nose without deformity, nasal mucosa and turbinates normal without polyps  Neck: supple and non-tender without mass, no thyromegaly or thyroid nodules, no cervical lymphadenopathy  Pulmonary/Chest: clear to auscultation bilaterally- no wheezes, rales or rhonchi, normal air movement, no respiratory distress  Cardiovascular: normal rate, regular rhythm, normal S1 and S2, no murmurs, rubs, clicks, or gallops, distal pulses intact, no carotid bruits  Abdomen: soft, non-tender, non-distended, normal bowel sounds, no masses or organomegaly  Genitourinary/Rectal: genitals normal without hernia or inguinal adenopathy, rectal normal without masses, prostate normal in size without masses or nodules  Extremities: no cyanosis, clubbing or edema  Musculoskeletal: normal range of motion, no joint swelling, deformity or tenderness  Neurologic: reflexes normal and symmetric, no cranial nerve deficit, gait, coordination and speech normal       No Known Allergies  Prior to Visit Medications    Medication Sig Taking?  Authorizing Provider   cyanocobalamin 500 MCG tablet Take 500 mcg by mouth daily Yes Ar Automatic Reconciliation   Naproxen Sodium 220 MG CAPS Take by mouth Yes Ar Automatic Reconciliation       CareTeam (Including outside providers/suppliers regularly involved in providing care):   Patient Care Team:  Lee Hope MD as PCP - Mikayla Montalvo MD as PCP - [facility] Empaneled Provider     Reviewed and updated this visit:  Allergies  Meds  Med Hx  Surg Hx  Soc Hx  Fam Hx

## 2022-12-18 PROBLEM — Z13.220 SCREENING FOR HYPERLIPIDEMIA: Status: RESOLVED | Noted: 2022-11-18 | Resolved: 2022-12-18

## 2022-12-22 ENCOUNTER — TELEPHONE (OUTPATIENT)
Dept: FAMILY MEDICINE CLINIC | Facility: CLINIC | Age: 72
End: 2022-12-22

## 2022-12-22 NOTE — TELEPHONE ENCOUNTER
CALLED PT TO NOTIFY RESULTS AS PER DR SWARTZ LEFT VOICEMAIL     Your recent lab results are normal.Labs great , they do not have MY CHART so call or letter

## 2023-11-22 ENCOUNTER — OFFICE VISIT (OUTPATIENT)
Dept: FAMILY MEDICINE CLINIC | Facility: CLINIC | Age: 73
End: 2023-11-22

## 2023-11-22 VITALS
HEART RATE: 56 BPM | DIASTOLIC BLOOD PRESSURE: 80 MMHG | BODY MASS INDEX: 32.6 KG/M2 | TEMPERATURE: 97.3 F | WEIGHT: 246 LBS | OXYGEN SATURATION: 99 % | SYSTOLIC BLOOD PRESSURE: 132 MMHG | HEIGHT: 73 IN

## 2023-11-22 DIAGNOSIS — Z00.00 MEDICARE ANNUAL WELLNESS VISIT, SUBSEQUENT: Primary | ICD-10-CM

## 2023-11-22 DIAGNOSIS — Z13.29 SCREENING FOR THYROID DISORDER: ICD-10-CM

## 2023-11-22 DIAGNOSIS — Z13.89 SCREENING FOR BLOOD OR PROTEIN IN URINE: ICD-10-CM

## 2023-11-22 DIAGNOSIS — Z13.228 SCREENING FOR METABOLIC DISORDER: ICD-10-CM

## 2023-11-22 DIAGNOSIS — Z12.5 SCREENING PSA (PROSTATE SPECIFIC ANTIGEN): ICD-10-CM

## 2023-11-22 DIAGNOSIS — Z13.220 SCREENING FOR HYPERLIPIDEMIA: ICD-10-CM

## 2023-11-22 DIAGNOSIS — E55.9 VITAMIN D DEFICIENCY: ICD-10-CM

## 2023-11-22 DIAGNOSIS — Z13.0 SCREENING FOR IRON DEFICIENCY ANEMIA: ICD-10-CM

## 2023-11-22 DIAGNOSIS — R97.20 ELEVATED PROSTATE SPECIFIC ANTIGEN (PSA): ICD-10-CM

## 2023-11-22 LAB
25(OH)D3 SERPL-MCNC: 59.9 NG/ML (ref 30–100)
ALBUMIN SERPL-MCNC: 4 G/DL (ref 3.2–4.6)
ALBUMIN/GLOB SERPL: 1.5 (ref 0.4–1.6)
ALP SERPL-CCNC: 56 U/L (ref 50–136)
ALT SERPL-CCNC: 26 U/L (ref 12–65)
ANION GAP SERPL CALC-SCNC: 9 MMOL/L (ref 2–11)
APPEARANCE UR: ABNORMAL
AST SERPL-CCNC: 22 U/L (ref 15–37)
BACTERIA URNS QL MICRO: NEGATIVE /HPF
BASOPHILS # BLD: 0.1 K/UL (ref 0–0.2)
BASOPHILS NFR BLD: 1 % (ref 0–2)
BILIRUB SERPL-MCNC: 0.7 MG/DL (ref 0.2–1.1)
BILIRUB UR QL: NEGATIVE
BUN SERPL-MCNC: 22 MG/DL (ref 8–23)
CALCIUM SERPL-MCNC: 9.2 MG/DL (ref 8.3–10.4)
CASTS URNS QL MICRO: ABNORMAL /LPF (ref 0–2)
CHLORIDE SERPL-SCNC: 108 MMOL/L (ref 101–110)
CHOLEST SERPL-MCNC: 236 MG/DL
CO2 SERPL-SCNC: 25 MMOL/L (ref 21–32)
COLOR UR: ABNORMAL
CREAT SERPL-MCNC: 0.9 MG/DL (ref 0.8–1.5)
DIFFERENTIAL METHOD BLD: NORMAL
EOSINOPHIL # BLD: 0.3 K/UL (ref 0–0.8)
EOSINOPHIL NFR BLD: 6 % (ref 0.5–7.8)
EPI CELLS #/AREA URNS HPF: ABNORMAL /HPF (ref 0–5)
ERYTHROCYTE [DISTWIDTH] IN BLOOD BY AUTOMATED COUNT: 13 % (ref 11.9–14.6)
GLOBULIN SER CALC-MCNC: 2.7 G/DL (ref 2.8–4.5)
GLUCOSE SERPL-MCNC: 100 MG/DL (ref 65–100)
GLUCOSE UR STRIP.AUTO-MCNC: NEGATIVE MG/DL
HCT VFR BLD AUTO: 43.5 % (ref 41.1–50.3)
HDLC SERPL-MCNC: 41 MG/DL (ref 40–60)
HDLC SERPL: 5.8
HEMOCCULT STL QL: NEGATIVE
HGB BLD-MCNC: 14.4 G/DL (ref 13.6–17.2)
HGB UR QL STRIP: NEGATIVE
IMM GRANULOCYTES # BLD AUTO: 0 K/UL (ref 0–0.5)
IMM GRANULOCYTES NFR BLD AUTO: 0 % (ref 0–5)
KETONES UR QL STRIP.AUTO: 15 MG/DL
LDLC SERPL CALC-MCNC: 181 MG/DL
LEUKOCYTE ESTERASE UR QL STRIP.AUTO: NEGATIVE
LYMPHOCYTES # BLD: 1.4 K/UL (ref 0.5–4.6)
LYMPHOCYTES NFR BLD: 26 % (ref 13–44)
MCH RBC QN AUTO: 32.4 PG (ref 26.1–32.9)
MCHC RBC AUTO-ENTMCNC: 33.1 G/DL (ref 31.4–35)
MCV RBC AUTO: 98 FL (ref 82–102)
MONOCYTES # BLD: 0.5 K/UL (ref 0.1–1.3)
MONOCYTES NFR BLD: 9 % (ref 4–12)
MUCOUS THREADS URNS QL MICRO: 0 /LPF
NEUTS SEG # BLD: 3.2 K/UL (ref 1.7–8.2)
NEUTS SEG NFR BLD: 58 % (ref 43–78)
NITRITE UR QL STRIP.AUTO: NEGATIVE
NRBC # BLD: 0 K/UL (ref 0–0.2)
PH UR STRIP: 5.5 (ref 5–9)
PLATELET # BLD AUTO: 214 K/UL (ref 150–450)
PMV BLD AUTO: 10.5 FL (ref 9.4–12.3)
POTASSIUM SERPL-SCNC: 4.1 MMOL/L (ref 3.5–5.1)
PROT SERPL-MCNC: 6.7 G/DL (ref 6.3–8.2)
PROT UR STRIP-MCNC: NEGATIVE MG/DL
PSA SERPL-MCNC: 2.1 NG/ML
RBC # BLD AUTO: 4.44 M/UL (ref 4.23–5.6)
RBC #/AREA URNS HPF: ABNORMAL /HPF (ref 0–5)
SODIUM SERPL-SCNC: 142 MMOL/L (ref 133–143)
SP GR UR REFRACTOMETRY: 1.03 (ref 1–1.02)
TRIGL SERPL-MCNC: 70 MG/DL (ref 35–150)
TSH, 3RD GENERATION: 2.83 UIU/ML (ref 0.36–3.74)
URINE CULTURE IF INDICATED: ABNORMAL
UROBILINOGEN UR QL STRIP.AUTO: 1 EU/DL (ref 0.2–1)
VALID INTERNAL CONTROL: YES
VLDLC SERPL CALC-MCNC: 14 MG/DL (ref 6–23)
WBC # BLD AUTO: 5.4 K/UL (ref 4.3–11.1)
WBC URNS QL MICRO: ABNORMAL /HPF (ref 0–4)

## 2023-11-22 SDOH — ECONOMIC STABILITY: FOOD INSECURITY: WITHIN THE PAST 12 MONTHS, YOU WORRIED THAT YOUR FOOD WOULD RUN OUT BEFORE YOU GOT MONEY TO BUY MORE.: NEVER TRUE

## 2023-11-22 SDOH — ECONOMIC STABILITY: HOUSING INSECURITY
IN THE LAST 12 MONTHS, WAS THERE A TIME WHEN YOU DID NOT HAVE A STEADY PLACE TO SLEEP OR SLEPT IN A SHELTER (INCLUDING NOW)?: NO

## 2023-11-22 SDOH — ECONOMIC STABILITY: FOOD INSECURITY: WITHIN THE PAST 12 MONTHS, THE FOOD YOU BOUGHT JUST DIDN'T LAST AND YOU DIDN'T HAVE MONEY TO GET MORE.: NEVER TRUE

## 2023-11-22 SDOH — ECONOMIC STABILITY: INCOME INSECURITY: HOW HARD IS IT FOR YOU TO PAY FOR THE VERY BASICS LIKE FOOD, HOUSING, MEDICAL CARE, AND HEATING?: NOT HARD AT ALL

## 2023-11-22 ASSESSMENT — PATIENT HEALTH QUESTIONNAIRE - PHQ9
1. LITTLE INTEREST OR PLEASURE IN DOING THINGS: 0
SUM OF ALL RESPONSES TO PHQ9 QUESTIONS 1 & 2: 0
SUM OF ALL RESPONSES TO PHQ QUESTIONS 1-9: 0
SUM OF ALL RESPONSES TO PHQ QUESTIONS 1-9: 0
2. FEELING DOWN, DEPRESSED OR HOPELESS: 0
SUM OF ALL RESPONSES TO PHQ QUESTIONS 1-9: 0
SUM OF ALL RESPONSES TO PHQ QUESTIONS 1-9: 0

## 2023-11-22 ASSESSMENT — LIFESTYLE VARIABLES
HOW MANY STANDARD DRINKS CONTAINING ALCOHOL DO YOU HAVE ON A TYPICAL DAY: 1 OR 2
HOW OFTEN DO YOU HAVE A DRINK CONTAINING ALCOHOL: MONTHLY OR LESS

## 2024-02-22 ENCOUNTER — TELEPHONE (OUTPATIENT)
Dept: FAMILY MEDICINE CLINIC | Facility: CLINIC | Age: 74
End: 2024-02-22

## 2024-02-22 NOTE — TELEPHONE ENCOUNTER
Patient's wife calling for patient to find out how to get a calcium score test for pt. Does pt need a referral?     Please reach out to patient [phone]

## 2024-11-22 DIAGNOSIS — Z12.5 SCREENING PSA (PROSTATE SPECIFIC ANTIGEN): Primary | ICD-10-CM

## 2024-11-22 DIAGNOSIS — Z00.00 ROUTINE GENERAL MEDICAL EXAMINATION AT A HEALTH CARE FACILITY: Primary | ICD-10-CM

## 2024-11-22 DIAGNOSIS — E78.5 HYPERLIPIDEMIA, UNSPECIFIED HYPERLIPIDEMIA TYPE: ICD-10-CM

## 2024-11-25 ENCOUNTER — LAB (OUTPATIENT)
Dept: FAMILY MEDICINE CLINIC | Facility: CLINIC | Age: 74
End: 2024-11-25

## 2024-11-25 DIAGNOSIS — Z12.5 SCREENING PSA (PROSTATE SPECIFIC ANTIGEN): ICD-10-CM

## 2024-11-25 DIAGNOSIS — Z00.00 ROUTINE GENERAL MEDICAL EXAMINATION AT A HEALTH CARE FACILITY: ICD-10-CM

## 2024-11-25 DIAGNOSIS — E78.5 HYPERLIPIDEMIA, UNSPECIFIED HYPERLIPIDEMIA TYPE: ICD-10-CM

## 2024-11-25 LAB
ALBUMIN SERPL-MCNC: 3.8 G/DL (ref 3.2–4.6)
ALBUMIN/GLOB SERPL: 1.2 (ref 1–1.9)
ALP SERPL-CCNC: 67 U/L (ref 40–129)
ALT SERPL-CCNC: 15 U/L (ref 8–55)
ANION GAP SERPL CALC-SCNC: 8 MMOL/L (ref 7–16)
APPEARANCE UR: CLEAR
AST SERPL-CCNC: 20 U/L (ref 15–37)
BACTERIA URNS QL MICRO: NEGATIVE /HPF
BASOPHILS # BLD: 0.1 K/UL (ref 0–0.2)
BASOPHILS NFR BLD: 1 % (ref 0–2)
BILIRUB SERPL-MCNC: 0.5 MG/DL (ref 0–1.2)
BILIRUB UR QL: NEGATIVE
BUN SERPL-MCNC: 18 MG/DL (ref 8–23)
CALCIUM SERPL-MCNC: 9.5 MG/DL (ref 8.8–10.2)
CASTS URNS QL MICRO: 0 /LPF
CHLORIDE SERPL-SCNC: 106 MMOL/L (ref 98–107)
CHOLEST SERPL-MCNC: 216 MG/DL (ref 0–200)
CO2 SERPL-SCNC: 27 MMOL/L (ref 20–29)
COLOR UR: NORMAL
CREAT SERPL-MCNC: 1.1 MG/DL (ref 0.8–1.3)
CRYSTALS URNS QL MICRO: 0 /LPF
DIFFERENTIAL METHOD BLD: NORMAL
EOSINOPHIL # BLD: 0.3 K/UL (ref 0–0.8)
EOSINOPHIL NFR BLD: 5 % (ref 0.5–7.8)
EPI CELLS #/AREA URNS HPF: NORMAL /HPF (ref 0–5)
ERYTHROCYTE [DISTWIDTH] IN BLOOD BY AUTOMATED COUNT: 13 % (ref 11.9–14.6)
GLOBULIN SER CALC-MCNC: 3 G/DL (ref 2.3–3.5)
GLUCOSE SERPL-MCNC: 108 MG/DL (ref 70–99)
GLUCOSE UR STRIP.AUTO-MCNC: NEGATIVE MG/DL
HCT VFR BLD AUTO: 42.6 % (ref 41.1–50.3)
HDLC SERPL-MCNC: 37 MG/DL (ref 40–60)
HDLC SERPL: 5.9 (ref 0–5)
HGB BLD-MCNC: 14.4 G/DL (ref 13.6–17.2)
HGB UR QL STRIP: NEGATIVE
HYALINE CASTS URNS QL MICRO: NORMAL /LPF
IMM GRANULOCYTES # BLD AUTO: 0 K/UL (ref 0–0.5)
IMM GRANULOCYTES NFR BLD AUTO: 1 % (ref 0–5)
KETONES UR QL STRIP.AUTO: NEGATIVE MG/DL
LDLC SERPL CALC-MCNC: 160 MG/DL (ref 0–100)
LEUKOCYTE ESTERASE UR QL STRIP.AUTO: NEGATIVE
LYMPHOCYTES # BLD: 1.5 K/UL (ref 0.5–4.6)
LYMPHOCYTES NFR BLD: 26 % (ref 13–44)
MCH RBC QN AUTO: 32.4 PG (ref 26.1–32.9)
MCHC RBC AUTO-ENTMCNC: 33.8 G/DL (ref 31.4–35)
MCV RBC AUTO: 95.9 FL (ref 82–102)
MONOCYTES # BLD: 0.6 K/UL (ref 0.1–1.3)
MONOCYTES NFR BLD: 10 % (ref 4–12)
MUCOUS THREADS URNS QL MICRO: 0 /LPF
NEUTS SEG # BLD: 3.3 K/UL (ref 1.7–8.2)
NEUTS SEG NFR BLD: 57 % (ref 43–78)
NITRITE UR QL STRIP.AUTO: NEGATIVE
NRBC # BLD: 0 K/UL (ref 0–0.2)
PH UR STRIP: 6 (ref 5–9)
PLATELET # BLD AUTO: 199 K/UL (ref 150–450)
PMV BLD AUTO: 10.4 FL (ref 9.4–12.3)
POTASSIUM SERPL-SCNC: 4.2 MMOL/L (ref 3.5–5.1)
PROT SERPL-MCNC: 6.8 G/DL (ref 6.3–8.2)
PROT UR STRIP-MCNC: NEGATIVE MG/DL
PSA SERPL-MCNC: 2.5 NG/ML (ref 0–4)
RBC # BLD AUTO: 4.44 M/UL (ref 4.23–5.6)
RBC #/AREA URNS HPF: NORMAL /HPF (ref 0–5)
SODIUM SERPL-SCNC: 141 MMOL/L (ref 136–145)
SP GR UR REFRACTOMETRY: 1.02 (ref 1–1.02)
TRIGL SERPL-MCNC: 95 MG/DL (ref 0–150)
TSH W FREE THYROID IF ABNORMAL: 2.73 UIU/ML (ref 0.27–4.2)
URINE CULTURE IF INDICATED: NORMAL
UROBILINOGEN UR QL STRIP.AUTO: 0.2 EU/DL (ref 0.2–1)
VLDLC SERPL CALC-MCNC: 19 MG/DL (ref 6–23)
WBC # BLD AUTO: 5.7 K/UL (ref 4.3–11.1)
WBC URNS QL MICRO: NORMAL /HPF (ref 0–4)

## 2024-12-03 ENCOUNTER — OFFICE VISIT (OUTPATIENT)
Dept: FAMILY MEDICINE CLINIC | Facility: CLINIC | Age: 74
End: 2024-12-03

## 2024-12-03 VITALS
WEIGHT: 255 LBS | OXYGEN SATURATION: 94 % | DIASTOLIC BLOOD PRESSURE: 90 MMHG | HEART RATE: 54 BPM | BODY MASS INDEX: 33.8 KG/M2 | HEIGHT: 73 IN | SYSTOLIC BLOOD PRESSURE: 144 MMHG | TEMPERATURE: 97.1 F

## 2024-12-03 DIAGNOSIS — Z00.00 MEDICARE ANNUAL WELLNESS VISIT, SUBSEQUENT: Primary | ICD-10-CM

## 2024-12-03 DIAGNOSIS — R23.3 ABNORMAL BRUISING: ICD-10-CM

## 2024-12-03 DIAGNOSIS — Z00.00 ROUTINE GENERAL MEDICAL EXAMINATION AT A HEALTH CARE FACILITY: ICD-10-CM

## 2024-12-03 DIAGNOSIS — E66.811 CLASS 1 OBESITY DUE TO EXCESS CALORIES WITH SERIOUS COMORBIDITY AND BODY MASS INDEX (BMI) OF 33.0 TO 33.9 IN ADULT: ICD-10-CM

## 2024-12-03 DIAGNOSIS — R00.1 BRADYCARDIA: ICD-10-CM

## 2024-12-03 DIAGNOSIS — E66.09 CLASS 1 OBESITY DUE TO EXCESS CALORIES WITH SERIOUS COMORBIDITY AND BODY MASS INDEX (BMI) OF 33.0 TO 33.9 IN ADULT: ICD-10-CM

## 2024-12-03 DIAGNOSIS — E78.2 MIXED HYPERLIPIDEMIA: ICD-10-CM

## 2024-12-03 DIAGNOSIS — Z86.0100 HISTORY OF COLONIC POLYPS: ICD-10-CM

## 2024-12-03 DIAGNOSIS — R03.0 ELEVATED BLOOD PRESSURE READING: ICD-10-CM

## 2024-12-03 DIAGNOSIS — R73.01 ELEVATED FASTING GLUCOSE: ICD-10-CM

## 2024-12-03 PROBLEM — E66.01 SEVERE OBESITY: Status: RESOLVED | Noted: 2020-10-02 | Resolved: 2024-12-03

## 2024-12-03 LAB — HBA1C MFR BLD: 5.2 %

## 2024-12-03 SDOH — ECONOMIC STABILITY: FOOD INSECURITY: WITHIN THE PAST 12 MONTHS, THE FOOD YOU BOUGHT JUST DIDN'T LAST AND YOU DIDN'T HAVE MONEY TO GET MORE.: NEVER TRUE

## 2024-12-03 SDOH — ECONOMIC STABILITY: INCOME INSECURITY: HOW HARD IS IT FOR YOU TO PAY FOR THE VERY BASICS LIKE FOOD, HOUSING, MEDICAL CARE, AND HEATING?: NOT HARD AT ALL

## 2024-12-03 SDOH — ECONOMIC STABILITY: FOOD INSECURITY: WITHIN THE PAST 12 MONTHS, YOU WORRIED THAT YOUR FOOD WOULD RUN OUT BEFORE YOU GOT MONEY TO BUY MORE.: NEVER TRUE

## 2024-12-03 ASSESSMENT — PATIENT HEALTH QUESTIONNAIRE - PHQ9
SUM OF ALL RESPONSES TO PHQ QUESTIONS 1-9: 0
2. FEELING DOWN, DEPRESSED OR HOPELESS: NOT AT ALL
SUM OF ALL RESPONSES TO PHQ9 QUESTIONS 1 & 2: 0
SUM OF ALL RESPONSES TO PHQ QUESTIONS 1-9: 0
1. LITTLE INTEREST OR PLEASURE IN DOING THINGS: NOT AT ALL

## 2024-12-03 ASSESSMENT — LIFESTYLE VARIABLES
HOW OFTEN DO YOU HAVE A DRINK CONTAINING ALCOHOL: 2-3 TIMES A WEEK
HOW MANY STANDARD DRINKS CONTAINING ALCOHOL DO YOU HAVE ON A TYPICAL DAY: 1 OR 2

## 2024-12-03 NOTE — PROGRESS NOTES
Medicare Annual Wellness Visit    Jerzy Thapa is here for Medicare AWV (Pt presents for AWV and states he is concerned with the bruising on bilateral hands )    Assessment & Plan   Medicare annual wellness visit, subsequent  Routine general medical examination at a health care facility  Mixed hyperlipidemia  Elevated fasting glucose  -     AMB POC HEMOGLOBIN A1C  History of colonic polyps  Bradycardia  Needs flu shot  Need for prophylactic vaccination against Streptococcus pneumoniae (pneumococcus)  Need for prophylactic vaccination and inoculation against varicella  Elevated blood pressure reading  Class 1 obesity due to excess calories with serious comorbidity and body mass index (BMI) of 33.0 to 33.9 in adult     Labs reviewed in detail.  Lipids improving but remain elevated.  No treatment needed at this time since age greater than 75 and recent calcium score of 4.  Patient instructed to reduce saturated fats and carbohydrates in diet.  Weight loss encouraged for ongoing obesity.  Maintain regular exercise.     A1c checked today due to elevated fasting glucose.  A1c 5.2%, no diabetes at this time.    BP elevated today.  Confirmed on recheck.  Patient instructed to check BP out of office at pharmacy or buy cuff at home.  Return for treatment if BP remaining greater than 140/90 at home.  Limit caffeine intake.  Limit sodium intake. Continue regular exercise and weight loss encouraged.    Bruising on hands improving.  Patient reassured this can be a normal aging process but instructed him to monitor bruising and report worsening/progressive bruising or bruising that fails to resolve.  Avoid NSAIDs and aspirin.  BP is elevated today but no other symptoms of Cushing's disease.  Patient instructed to return for treatment/evaluation if other symptoms develop such as fatigue, weight gain, arthralgias, swollen face.    Bradycardia with no symptoms.  No treatment at this time needed.    HM discussed:  Repeat

## (undated) DEVICE — KENDALL RADIOLUCENT FOAM MONITORING ELECTRODE RECTANGULAR SHAPE: Brand: KENDALL

## (undated) DEVICE — CONNECTOR TBNG OD5-7MM O2 END DISP

## (undated) DEVICE — CANNULA NSL ORAL AD FOR CAPNOFLEX CO2 O2 AIRLFE

## (undated) DEVICE — AIRLIFE™ OXYGEN TUBING 7 FEET (2.1 M) CRUSH RESISTANT OXYGEN TUBING, VINYL TIPPED: Brand: AIRLIFE™